# Patient Record
Sex: FEMALE | Race: WHITE | Employment: FULL TIME | URBAN - METROPOLITAN AREA
[De-identification: names, ages, dates, MRNs, and addresses within clinical notes are randomized per-mention and may not be internally consistent; named-entity substitution may affect disease eponyms.]

---

## 2017-02-24 LAB
ALK PHOSPHATASE: 146 IU/L (ref 35–121)
AVERAGE GLUCOSE: 148 MG/DL (ref 66–114)
HBA1C MFR BLD: 6.8 % (ref 4.2–5.8)

## 2017-02-27 ENCOUNTER — OFFICE VISIT (OUTPATIENT)
Dept: FAMILY MEDICINE CLINIC | Age: 47
End: 2017-02-27

## 2017-02-27 VITALS
WEIGHT: 222.2 LBS | TEMPERATURE: 98.3 F | BODY MASS INDEX: 34.88 KG/M2 | DIASTOLIC BLOOD PRESSURE: 64 MMHG | RESPIRATION RATE: 16 BRPM | HEART RATE: 88 BPM | SYSTOLIC BLOOD PRESSURE: 118 MMHG | HEIGHT: 67 IN

## 2017-02-27 DIAGNOSIS — E78.5 HYPERLIPIDEMIA, UNSPECIFIED HYPERLIPIDEMIA TYPE: ICD-10-CM

## 2017-02-27 DIAGNOSIS — R74.8 ALKALINE PHOSPHATASE RAISED: ICD-10-CM

## 2017-02-27 DIAGNOSIS — L82.1 SEBORRHEIC KERATOSIS: ICD-10-CM

## 2017-02-27 PROCEDURE — G8484 FLU IMMUNIZE NO ADMIN: HCPCS | Performed by: FAMILY MEDICINE

## 2017-02-27 PROCEDURE — G8427 DOCREV CUR MEDS BY ELIG CLIN: HCPCS | Performed by: FAMILY MEDICINE

## 2017-02-27 PROCEDURE — 99214 OFFICE O/P EST MOD 30 MIN: CPT | Performed by: FAMILY MEDICINE

## 2017-02-27 PROCEDURE — 1036F TOBACCO NON-USER: CPT | Performed by: FAMILY MEDICINE

## 2017-02-27 PROCEDURE — G8417 CALC BMI ABV UP PARAM F/U: HCPCS | Performed by: FAMILY MEDICINE

## 2017-02-27 PROCEDURE — 3044F HG A1C LEVEL LT 7.0%: CPT | Performed by: FAMILY MEDICINE

## 2017-02-27 RX ORDER — LISINOPRIL 5 MG/1
5 TABLET ORAL DAILY
Qty: 90 TABLET | Refills: 3 | Status: SHIPPED | OUTPATIENT
Start: 2017-02-27 | End: 2018-06-26 | Stop reason: SDUPTHER

## 2017-02-27 RX ORDER — INSULIN GLARGINE 100 [IU]/ML
INJECTION, SOLUTION SUBCUTANEOUS 2 TIMES DAILY
COMMUNITY
End: 2017-02-27 | Stop reason: SDUPTHER

## 2017-02-27 RX ORDER — INSULIN GLARGINE 100 [IU]/ML
INJECTION, SOLUTION SUBCUTANEOUS
Qty: 4 VIAL | Refills: 3 | Status: SHIPPED | OUTPATIENT
Start: 2017-02-27 | End: 2018-03-22 | Stop reason: SDUPTHER

## 2017-02-27 ASSESSMENT — ENCOUNTER SYMPTOMS
NAUSEA: 0
ANAL BLEEDING: 0
ABDOMINAL PAIN: 0
VOMITING: 0
CONSTIPATION: 0
CHEST TIGHTNESS: 0
SHORTNESS OF BREATH: 0
BLOOD IN STOOL: 0
DIARRHEA: 0

## 2017-03-13 ENCOUNTER — TELEPHONE (OUTPATIENT)
Dept: FAMILY MEDICINE CLINIC | Age: 47
End: 2017-03-13

## 2017-05-25 ENCOUNTER — TELEPHONE (OUTPATIENT)
Dept: FAMILY MEDICINE CLINIC | Age: 47
End: 2017-05-25

## 2017-05-26 LAB
ALBUMIN SERPL-MCNC: 3.7 G/DL (ref 3.2–5.3)
ALK PHOSPHATASE: 152 IU/L (ref 35–121)
ALT SERPL-CCNC: 9 IU/L (ref 5–59)
ANION GAP SERPL CALCULATED.3IONS-SCNC: 11 MMOL/L
AST SERPL-CCNC: 13 IU/L (ref 10–42)
BILIRUB SERPL-MCNC: 0.5 MG/DL (ref 0.2–1.3)
BUN BLDV-MCNC: 10 MG/DL (ref 10–20)
CALCIUM SERPL-MCNC: 9.4 MG/DL (ref 8.7–10.8)
CHLORIDE BLD-SCNC: 100 MMOL/L (ref 95–111)
CHOLESTEROL/HDL RATIO: 3.4
CHOLESTEROL: 217 MG/DL
CO2: 32 MMOL/L (ref 21–32)
CREAT SERPL-MCNC: 0.7 MG/DL (ref 0.5–1.3)
EGFR AFRICAN AMERICAN: 109
EGFR IF NONAFRICAN AMERICAN: 90
GLUCOSE: 182 MG/DL (ref 70–100)
HDLC SERPL-MCNC: 63 MG/DL (ref 40–60)
LDL CHOLESTEROL CALCULATED: 134 MG/DL
LDL/HDL RATIO: 2.1
POTASSIUM SERPL-SCNC: 4.3 MMOL/L (ref 3.5–5.4)
SODIUM BLD-SCNC: 139 MMOL/L (ref 134–147)
TOTAL PROTEIN: 6.7 G/DL (ref 5.8–8)
TRIGL SERPL-MCNC: 98 MG/DL
VLDLC SERPL CALC-MCNC: 20 MG/DL

## 2017-05-27 LAB
AVERAGE GLUCOSE: 160 MG/DL (ref 66–114)
HBA1C MFR BLD: 7.2 % (ref 4.2–5.8)
T4 FREE: 1.03 NG/DL (ref 0.8–1.8)
TSH SERPL DL<=0.05 MIU/L-ACNC: 4.21 UIU/ML (ref 0.4–4.4)

## 2017-06-06 ENCOUNTER — OFFICE VISIT (OUTPATIENT)
Dept: FAMILY MEDICINE CLINIC | Age: 47
End: 2017-06-06

## 2017-06-06 VITALS
WEIGHT: 222.4 LBS | HEART RATE: 80 BPM | SYSTOLIC BLOOD PRESSURE: 130 MMHG | TEMPERATURE: 97.7 F | DIASTOLIC BLOOD PRESSURE: 78 MMHG | BODY MASS INDEX: 34.83 KG/M2

## 2017-06-06 DIAGNOSIS — R74.8 ALKALINE PHOSPHATASE RAISED: ICD-10-CM

## 2017-06-06 DIAGNOSIS — Z12.31 VISIT FOR SCREENING MAMMOGRAM: ICD-10-CM

## 2017-06-06 DIAGNOSIS — E78.5 HYPERLIPIDEMIA, UNSPECIFIED HYPERLIPIDEMIA TYPE: ICD-10-CM

## 2017-06-06 PROCEDURE — 99214 OFFICE O/P EST MOD 30 MIN: CPT | Performed by: FAMILY MEDICINE

## 2017-06-06 PROCEDURE — G8417 CALC BMI ABV UP PARAM F/U: HCPCS | Performed by: FAMILY MEDICINE

## 2017-06-06 PROCEDURE — G8427 DOCREV CUR MEDS BY ELIG CLIN: HCPCS | Performed by: FAMILY MEDICINE

## 2017-06-06 PROCEDURE — 1036F TOBACCO NON-USER: CPT | Performed by: FAMILY MEDICINE

## 2017-06-06 PROCEDURE — 3045F PR MOST RECENT HEMOGLOBIN A1C LEVEL 7.0-9.0%: CPT | Performed by: FAMILY MEDICINE

## 2017-06-06 ASSESSMENT — ENCOUNTER SYMPTOMS
NAUSEA: 0
CHEST TIGHTNESS: 0
ANAL BLEEDING: 0
BLOOD IN STOOL: 0
VOMITING: 0
SHORTNESS OF BREATH: 0
CONSTIPATION: 0
DIARRHEA: 0
ABDOMINAL PAIN: 0

## 2017-09-08 LAB
ALK PHOSPHATASE: 159 IU/L (ref 35–121)
AVERAGE GLUCOSE: 163 MG/DL (ref 66–114)
CHOLESTEROL/HDL RATIO: 3.3
CHOLESTEROL: 173 MG/DL
HBA1C MFR BLD: 7.3 % (ref 4.2–5.8)
HDLC SERPL-MCNC: 53 MG/DL (ref 40–60)
LDL CHOLESTEROL CALCULATED: 93 MG/DL
LDL/HDL RATIO: 1.8
TRIGL SERPL-MCNC: 134 MG/DL
VLDLC SERPL CALC-MCNC: 27 MG/DL

## 2017-09-12 ENCOUNTER — OFFICE VISIT (OUTPATIENT)
Dept: FAMILY MEDICINE CLINIC | Age: 47
End: 2017-09-12
Payer: COMMERCIAL

## 2017-09-12 VITALS
HEIGHT: 66 IN | HEART RATE: 92 BPM | TEMPERATURE: 98.9 F | DIASTOLIC BLOOD PRESSURE: 58 MMHG | WEIGHT: 219 LBS | RESPIRATION RATE: 16 BRPM | BODY MASS INDEX: 35.2 KG/M2 | SYSTOLIC BLOOD PRESSURE: 108 MMHG

## 2017-09-12 DIAGNOSIS — E78.5 HYPERLIPIDEMIA, UNSPECIFIED HYPERLIPIDEMIA TYPE: ICD-10-CM

## 2017-09-12 DIAGNOSIS — R74.8 ALKALINE PHOSPHATASE RAISED: ICD-10-CM

## 2017-09-12 PROCEDURE — 1036F TOBACCO NON-USER: CPT | Performed by: FAMILY MEDICINE

## 2017-09-12 PROCEDURE — G8417 CALC BMI ABV UP PARAM F/U: HCPCS | Performed by: FAMILY MEDICINE

## 2017-09-12 PROCEDURE — G8427 DOCREV CUR MEDS BY ELIG CLIN: HCPCS | Performed by: FAMILY MEDICINE

## 2017-09-12 PROCEDURE — 99214 OFFICE O/P EST MOD 30 MIN: CPT | Performed by: FAMILY MEDICINE

## 2017-09-12 PROCEDURE — 3046F HEMOGLOBIN A1C LEVEL >9.0%: CPT | Performed by: FAMILY MEDICINE

## 2017-09-12 ASSESSMENT — ENCOUNTER SYMPTOMS
BLOOD IN STOOL: 0
DIARRHEA: 0
CHEST TIGHTNESS: 0
ANAL BLEEDING: 0
ABDOMINAL PAIN: 0
VOMITING: 0
CONSTIPATION: 0
NAUSEA: 0
SHORTNESS OF BREATH: 0

## 2017-09-12 ASSESSMENT — PATIENT HEALTH QUESTIONNAIRE - PHQ9
SUM OF ALL RESPONSES TO PHQ9 QUESTIONS 1 & 2: 0
1. LITTLE INTEREST OR PLEASURE IN DOING THINGS: 0
2. FEELING DOWN, DEPRESSED OR HOPELESS: 0
SUM OF ALL RESPONSES TO PHQ QUESTIONS 1-9: 0

## 2017-09-18 ENCOUNTER — PATIENT MESSAGE (OUTPATIENT)
Dept: FAMILY MEDICINE CLINIC | Age: 47
End: 2017-09-18

## 2017-09-19 ENCOUNTER — TELEPHONE (OUTPATIENT)
Dept: FAMILY MEDICINE CLINIC | Age: 47
End: 2017-09-19

## 2017-10-24 ENCOUNTER — PROCEDURE VISIT (OUTPATIENT)
Dept: FAMILY MEDICINE CLINIC | Age: 47
End: 2017-10-24
Payer: COMMERCIAL

## 2017-10-24 VITALS
SYSTOLIC BLOOD PRESSURE: 114 MMHG | DIASTOLIC BLOOD PRESSURE: 68 MMHG | WEIGHT: 218.8 LBS | BODY MASS INDEX: 35.32 KG/M2 | RESPIRATION RATE: 16 BRPM | TEMPERATURE: 97.8 F | HEART RATE: 88 BPM

## 2017-10-24 DIAGNOSIS — L91.8 CUTANEOUS TAG: Primary | ICD-10-CM

## 2017-10-24 PROCEDURE — 11200 RMVL SKIN TAGS UP TO&INC 15: CPT | Performed by: FAMILY MEDICINE

## 2017-10-24 RX ORDER — UBIDECARENONE 100 MG
200 CAPSULE ORAL DAILY
COMMUNITY

## 2017-12-10 LAB — ALK PHOSPHATASE: 163 IU/L (ref 35–121)

## 2017-12-12 LAB — LDL CHOLESTEROL DIRECT: 75 MG/DL

## 2017-12-13 ENCOUNTER — OFFICE VISIT (OUTPATIENT)
Dept: FAMILY MEDICINE CLINIC | Age: 47
End: 2017-12-13
Payer: COMMERCIAL

## 2017-12-13 VITALS
RESPIRATION RATE: 16 BRPM | BODY MASS INDEX: 35.64 KG/M2 | HEART RATE: 92 BPM | TEMPERATURE: 97.7 F | SYSTOLIC BLOOD PRESSURE: 120 MMHG | DIASTOLIC BLOOD PRESSURE: 66 MMHG | WEIGHT: 220.8 LBS

## 2017-12-13 DIAGNOSIS — E78.5 HYPERLIPIDEMIA, UNSPECIFIED HYPERLIPIDEMIA TYPE: ICD-10-CM

## 2017-12-13 DIAGNOSIS — R74.8 ALKALINE PHOSPHATASE RAISED: ICD-10-CM

## 2017-12-13 LAB
AVERAGE GLUCOSE: 157 MG/DL (ref 66–114)
HBA1C MFR BLD: 7.1 % (ref 4.2–5.8)

## 2017-12-13 PROCEDURE — 1036F TOBACCO NON-USER: CPT | Performed by: FAMILY MEDICINE

## 2017-12-13 PROCEDURE — G8482 FLU IMMUNIZE ORDER/ADMIN: HCPCS | Performed by: FAMILY MEDICINE

## 2017-12-13 PROCEDURE — G8427 DOCREV CUR MEDS BY ELIG CLIN: HCPCS | Performed by: FAMILY MEDICINE

## 2017-12-13 PROCEDURE — 99214 OFFICE O/P EST MOD 30 MIN: CPT | Performed by: FAMILY MEDICINE

## 2017-12-13 PROCEDURE — G8417 CALC BMI ABV UP PARAM F/U: HCPCS | Performed by: FAMILY MEDICINE

## 2017-12-13 PROCEDURE — 3045F PR MOST RECENT HEMOGLOBIN A1C LEVEL 7.0-9.0%: CPT | Performed by: FAMILY MEDICINE

## 2017-12-13 ASSESSMENT — ENCOUNTER SYMPTOMS
VOMITING: 0
CONSTIPATION: 0
DIARRHEA: 0
CHEST TIGHTNESS: 0
BLOOD IN STOOL: 0
ANAL BLEEDING: 0
NAUSEA: 0
SHORTNESS OF BREATH: 0
ABDOMINAL PAIN: 0
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
## 2017-12-18 ENCOUNTER — TELEPHONE (OUTPATIENT)
Dept: FAMILY MEDICINE CLINIC | Age: 47
End: 2017-12-18

## 2017-12-18 NOTE — TELEPHONE ENCOUNTER
The pt called with a list of her blood sugar readings:    12/13/17  10:40 ; took 18 units Lantus    12/14/17  123 fasting     10:50 ; took 18 units Lantus (ate   Smarties and peanut butter)    12/15/17 224 fasting     Didn't write down PM numbers    12/16/17  Didn't write down AM numbers    10:20 ; took 18 units Lantus (ate   1/2 piece of bread with peanut butter and   lightly sprinkled sugar)    12/17/18 Fasting 82    10:30  (munching all evening);   took 18 units and 1 unit of Humalog    12/18/17  Fasting 179    Please advise.

## 2017-12-18 NOTE — TELEPHONE ENCOUNTER
Sugars look higher in AM with decrease in evening Lantus and late night snacking. Try to cut oiut snacking and see where sugars go with only decreased Lantus.   ES

## 2018-01-04 ENCOUNTER — OFFICE VISIT (OUTPATIENT)
Dept: FAMILY MEDICINE CLINIC | Age: 48
End: 2018-01-04
Payer: COMMERCIAL

## 2018-01-04 VITALS
TEMPERATURE: 98.6 F | DIASTOLIC BLOOD PRESSURE: 56 MMHG | HEIGHT: 66 IN | BODY MASS INDEX: 35.93 KG/M2 | RESPIRATION RATE: 18 BRPM | SYSTOLIC BLOOD PRESSURE: 120 MMHG | WEIGHT: 223.6 LBS | HEART RATE: 102 BPM

## 2018-01-04 DIAGNOSIS — J20.9 ACUTE BRONCHITIS DUE TO INFECTION: Primary | ICD-10-CM

## 2018-01-04 PROCEDURE — 99213 OFFICE O/P EST LOW 20 MIN: CPT | Performed by: NURSE PRACTITIONER

## 2018-01-04 RX ORDER — DOXYCYCLINE HYCLATE 100 MG
100 TABLET ORAL 2 TIMES DAILY
Qty: 20 TABLET | Refills: 0 | Status: SHIPPED | OUTPATIENT
Start: 2018-01-04 | End: 2018-01-14

## 2018-01-04 ASSESSMENT — ENCOUNTER SYMPTOMS
EYE REDNESS: 0
COUGH: 1
SINUS PRESSURE: 0
SHORTNESS OF BREATH: 0
EYE ITCHING: 0
TROUBLE SWALLOWING: 0
VOMITING: 0
RHINORRHEA: 1
WHEEZING: 1
EYE PAIN: 0
HEMOPTYSIS: 0
CONSTIPATION: 0
CHEST TIGHTNESS: 0
EYE DISCHARGE: 0
NAUSEA: 0
ABDOMINAL PAIN: 0
DIARRHEA: 0
SORE THROAT: 1
BACK PAIN: 0

## 2018-01-04 NOTE — PROGRESS NOTES
Exam   Constitutional: She is oriented to person, place, and time. She appears well-developed and well-nourished. Non-toxic appearance. She appears ill. No distress. HENT:   Head: Normocephalic and atraumatic. Right Ear: Hearing, external ear and ear canal normal. Tympanic membrane is retracted. Left Ear: Hearing, external ear and ear canal normal. Tympanic membrane is retracted (bilateral TMs erythematous and bulging. ). Nose: Mucosal edema (bilateral nasal turbinates are erythematous and swollen) and rhinorrhea present. Right sinus exhibits maxillary sinus tenderness and frontal sinus tenderness. Left sinus exhibits maxillary sinus tenderness and frontal sinus tenderness. Mouth/Throat: Uvula is midline. Posterior oropharyngeal edema and posterior oropharyngeal erythema present. No oropharyngeal exudate or tonsillar abscesses. Eyes: Conjunctivae and EOM are normal. Pupils are equal, round, and reactive to light. Neck: Normal range of motion. Neck supple. No JVD present. No tracheal deviation present. No thyromegaly present. Cardiovascular: Normal rate and regular rhythm. Exam reveals no gallop and no friction rub. Murmur (grade 2) heard. Pulmonary/Chest: Effort normal. No stridor. No respiratory distress. She has wheezes. She has no rales. She exhibits no tenderness. Posterior upper lobes expiratory wheezing   Abdominal: Bowel sounds are normal.   Lymphadenopathy:     She has cervical adenopathy. Neurological: She is alert and oriented to person, place, and time. Skin: Skin is warm and dry. Psychiatric: Her behavior is normal. Judgment normal.   Nursing note and vitals reviewed. Assessment:      1. Acute bronchitis due to infection  doxycycline hyclate (VIBRA-TABS) 100 MG tablet           Plan:       Continue to monitor BS, Call office if any new concerns. Patient given educational materials - see patient instructions.   Discussed use, benefit, and side effects of prescribed

## 2018-03-18 LAB
ALK PHOSPHATASE: 165 U/L (ref 30–103)
AVERAGE GLUCOSE: 174 MG/DL (ref 66–114)
HBA1C MFR BLD: 7.7 % (ref 4.2–5.8)
LDL CHOLESTEROL DIRECT: 82 MG/DL

## 2018-03-19 LAB
CREATINE, URINE: 36.8 MG/DL (ref 28–217)
MICROALBUMIN/CREAT 24H UR: <12 MG/DL
MICROALBUMIN/CREAT UR-RTO: NORMAL MG/G

## 2018-03-22 ENCOUNTER — OFFICE VISIT (OUTPATIENT)
Dept: FAMILY MEDICINE CLINIC | Age: 48
End: 2018-03-22
Payer: COMMERCIAL

## 2018-03-22 VITALS
RESPIRATION RATE: 16 BRPM | DIASTOLIC BLOOD PRESSURE: 64 MMHG | HEART RATE: 96 BPM | WEIGHT: 224.2 LBS | BODY MASS INDEX: 36.19 KG/M2 | TEMPERATURE: 98.3 F | SYSTOLIC BLOOD PRESSURE: 132 MMHG

## 2018-03-22 DIAGNOSIS — E78.5 HYPERLIPIDEMIA, UNSPECIFIED HYPERLIPIDEMIA TYPE: ICD-10-CM

## 2018-03-22 DIAGNOSIS — R74.8 ALKALINE PHOSPHATASE RAISED: ICD-10-CM

## 2018-03-22 PROCEDURE — G8427 DOCREV CUR MEDS BY ELIG CLIN: HCPCS | Performed by: FAMILY MEDICINE

## 2018-03-22 PROCEDURE — 99214 OFFICE O/P EST MOD 30 MIN: CPT | Performed by: FAMILY MEDICINE

## 2018-03-22 PROCEDURE — 1036F TOBACCO NON-USER: CPT | Performed by: FAMILY MEDICINE

## 2018-03-22 PROCEDURE — G8417 CALC BMI ABV UP PARAM F/U: HCPCS | Performed by: FAMILY MEDICINE

## 2018-03-22 PROCEDURE — G8482 FLU IMMUNIZE ORDER/ADMIN: HCPCS | Performed by: FAMILY MEDICINE

## 2018-03-22 PROCEDURE — 3045F PR MOST RECENT HEMOGLOBIN A1C LEVEL 7.0-9.0%: CPT | Performed by: FAMILY MEDICINE

## 2018-03-22 RX ORDER — INSULIN GLARGINE 100 [IU]/ML
INJECTION, SOLUTION SUBCUTANEOUS
Qty: 4 VIAL | Refills: 3 | Status: SHIPPED | OUTPATIENT
Start: 2018-03-22 | End: 2019-08-06 | Stop reason: SDUPTHER

## 2018-03-22 ASSESSMENT — ENCOUNTER SYMPTOMS
NAUSEA: 0
SHORTNESS OF BREATH: 0
ANAL BLEEDING: 0
ABDOMINAL PAIN: 0
CHEST TIGHTNESS: 0
VOMITING: 0
DIARRHEA: 0
CONSTIPATION: 0
BLOOD IN STOOL: 0

## 2018-03-22 NOTE — PROGRESS NOTES
behavior is normal. Judgment and thought content normal.   Nursing note and vitals reviewed.     Component      Latest Ref Rng & Units 3/17/2018   Creatine, Urine      28 - 217 mg/dL 36.8   Microalb, Ur      mg/dL <12.0   Microalbumin Creatinine Ratio      <30 mg/g UNABLE TO CALCULATE   Hemoglobin A1C      4.2 - 5.8 % 7.7 (H)   AVERAGE GLUCOSE      66 - 114 mg/dL 174 (H)   Alk Phosphatase      30 - 103 U/L 165 (H)   LDL Direct      <100 mg/dL 82       Component      Latest Ref Rng & Units 3/17/2018 12/9/2017 9/7/2017   Glucose      70 - 100 mg/dl      BUN      10 - 20 mg/dl      Creatinine      0.5 - 1.3 mg/dl      eGFR African American      >60      EGFR IF NonAfrican American      >60      Calcium      8.7 - 10.8 mg/dl      Sodium      134 - 147 mmol/L      Potassium      3.5 - 5.4 mmol/L      Chloride      95 - 111 mmol/L      CO2      21 - 32 mmol/L      Anion Gap            Bilirubin      0.2 - 1.3 mg/dl      Alk Phosphatase      30 - 103 U/L 165 (H) 163 (H) 159 (H)   AST      10 - 42 IU/L      ALT      5 - 59 IU/L      Total Protein      5.8 - 8.0 g/dl      Albumin      3.2 - 5.3 g/dl      Cholesterol      <200 mg/dl   173   Triglycerides      <150 mg/dl   134   HDL Cholesterol      40 - 60 mg/dl   53   LDL Calculated      <130 mg/dL   93   VLDL Cholesterol Calculated      <30 mg/dL   27   LDl/HDL Ratio      <3.5   1.8   Chol/HDL Ratio      <5   3.3   Creatine, Urine      28 - 217 mg/dL 36.8     Microalb, Ur      mg/dL <12.0     Microalbumin Creatinine Ratio      <30 mg/g UNABLE TO CALCULATE     Hemoglobin A1C      4.2 - 5.8 % 7.7 (H) 7.1 (H) 7.3 (H)   AVERAGE GLUCOSE      66 - 114 mg/dL 174 (H) 157 (H) 163 (H)   TSH      0.40 - 4.40 uIU/mL      T4 Free      0.80 - 1.80 ng/dl      LDL Direct      <100 mg/dL 82 75      Component      Latest Ref Rng & Units 5/26/2017   Glucose      70 - 100 mg/dl 182 (H)   BUN      10 - 20 mg/dl 10   Creatinine      0.5 - 1.3 mg/dl 0.7   eGFR African American      >60 109   EGFR IF NonAfrican American      >60 90   Calcium      8.7 - 10.8 mg/dl 9.4   Sodium      134 - 147 mmol/L 139   Potassium      3.5 - 5.4 mmol/L 4.3   Chloride      95 - 111 mmol/L 100   CO2      21 - 32 mmol/L 32   Anion Gap       11   Bilirubin      0.2 - 1.3 mg/dl 0.5   Alk Phosphatase      30 - 103 U/L 152 (H)   AST      10 - 42 IU/L 13   ALT      5 - 59 IU/L 9   Total Protein      5.8 - 8.0 g/dl 6.7   Albumin      3.2 - 5.3 g/dl 3.7   Cholesterol      <200 mg/dl 217 (H)   Triglycerides      <150 mg/dl 98   HDL Cholesterol      40 - 60 mg/dl 63 (H)   LDL Calculated      <130 mg/dL 134 (H)   VLDL Cholesterol Calculated      <30 mg/dL 20   LDl/HDL Ratio      <3.5 2.1   Chol/HDL Ratio      <5 3.4   Creatine, Urine      28 - 217 mg/dL    Microalb, Ur      mg/dL    Microalbumin Creatinine Ratio      <30 mg/g    Hemoglobin A1C      4.2 - 5.8 % 7.2 (H)   AVERAGE GLUCOSE      66 - 114 mg/dL 160 (H)   TSH      0.40 - 4.40 uIU/mL 4.210   T4 Free      0.80 - 1.80 ng/dl 1.03   LDL Direct      <100 mg/dL        The 10-year ASCVD risk score (Carl Lomeli., et al., 2013) is: 1.8%    Values used to calculate the score:      Age: 49 years      Sex: Female      Is Non- : No      Diabetic: Yes      Tobacco smoker: No      Systolic Blood Pressure: 883 mmHg      Is BP treated: No      HDL Cholesterol: 53 mg/dl      Total Cholesterol: 173 mg/dl       Immunization History   Administered Date(s) Administered    Influenza Virus Vaccine 12/01/2012, 11/29/2013, 12/10/2014, 11/09/2015, 10/23/2017    Influenza, Luciana Klein, 3 Years and older, IM 10/03/2016    Pneumococcal Polysaccharide ([id number]) 08/03/2011    Td 01/01/2008    Tdap (Boostrix, Adacel) 09/13/2017       Health Maintenance   Topic Date Due    Diabetic retinal exam  04/24/2018    Cervical cancer screen  05/30/2018    Diabetic foot exam  06/06/2018    A1C test (Diabetic or Prediabetic)  06/17/2018    Diabetic microalbuminuria test  03/17/2019    Lipid

## 2018-05-07 ENCOUNTER — TELEPHONE (OUTPATIENT)
Dept: FAMILY MEDICINE CLINIC | Age: 48
End: 2018-05-07

## 2018-05-14 ENCOUNTER — TELEPHONE (OUTPATIENT)
Dept: FAMILY MEDICINE CLINIC | Age: 48
End: 2018-05-14

## 2018-06-23 LAB
AVERAGE GLUCOSE: 154
HBA1C MFR BLD: 7 %

## 2018-06-24 LAB
AVERAGE GLUCOSE: 154 MG/DL (ref 66–114)
HBA1C MFR BLD: 7 % (ref 4.2–5.8)
T4 FREE: 1.17 NG/DL (ref 0.8–1.9)
TSH SERPL DL<=0.05 MIU/L-ACNC: 2.05 UIU/ML (ref 0.4–4.1)

## 2018-06-26 ENCOUNTER — OFFICE VISIT (OUTPATIENT)
Dept: FAMILY MEDICINE CLINIC | Age: 48
End: 2018-06-26
Payer: COMMERCIAL

## 2018-06-26 VITALS
BODY MASS INDEX: 35.51 KG/M2 | HEART RATE: 80 BPM | TEMPERATURE: 97.8 F | SYSTOLIC BLOOD PRESSURE: 110 MMHG | DIASTOLIC BLOOD PRESSURE: 64 MMHG | WEIGHT: 220 LBS | RESPIRATION RATE: 16 BRPM

## 2018-06-26 DIAGNOSIS — R74.8 ALKALINE PHOSPHATASE RAISED: ICD-10-CM

## 2018-06-26 DIAGNOSIS — E78.5 HYPERLIPIDEMIA, UNSPECIFIED HYPERLIPIDEMIA TYPE: ICD-10-CM

## 2018-06-26 PROCEDURE — 99214 OFFICE O/P EST MOD 30 MIN: CPT | Performed by: FAMILY MEDICINE

## 2018-06-26 PROCEDURE — 3045F PR MOST RECENT HEMOGLOBIN A1C LEVEL 7.0-9.0%: CPT | Performed by: FAMILY MEDICINE

## 2018-06-26 PROCEDURE — 2022F DILAT RTA XM EVC RTNOPTHY: CPT | Performed by: FAMILY MEDICINE

## 2018-06-26 PROCEDURE — 1036F TOBACCO NON-USER: CPT | Performed by: FAMILY MEDICINE

## 2018-06-26 PROCEDURE — G8417 CALC BMI ABV UP PARAM F/U: HCPCS | Performed by: FAMILY MEDICINE

## 2018-06-26 PROCEDURE — G8427 DOCREV CUR MEDS BY ELIG CLIN: HCPCS | Performed by: FAMILY MEDICINE

## 2018-06-26 RX ORDER — LISINOPRIL 5 MG/1
5 TABLET ORAL DAILY
Qty: 90 TABLET | Refills: 3 | Status: SHIPPED | OUTPATIENT
Start: 2018-06-26 | End: 2019-08-06 | Stop reason: SDUPTHER

## 2018-06-26 ASSESSMENT — ENCOUNTER SYMPTOMS
VOMITING: 0
BLOOD IN STOOL: 0
ANAL BLEEDING: 0
CONSTIPATION: 0
ABDOMINAL PAIN: 0
DIARRHEA: 0
SHORTNESS OF BREATH: 0
CHEST TIGHTNESS: 0
NAUSEA: 0

## 2018-06-28 LAB
ALBUMIN SERPL-MCNC: 3.9 G/DL (ref 3.5–5.2)
ALK PHOSPHATASE: 165 U/L (ref 30–103)
ALT SERPL-CCNC: 10 U/L (ref 5–40)
ANION GAP SERPL CALCULATED.3IONS-SCNC: 9 MEQ/L (ref 10–19)
AST SERPL-CCNC: 9 U/L (ref 9–40)
BILIRUB SERPL-MCNC: 0.5 MG/DL
BUN BLDV-MCNC: 10 MG/DL (ref 8–23)
CALCIUM SERPL-MCNC: 8.7 MG/DL (ref 8.5–10.5)
CHLORIDE BLD-SCNC: 97 MEQ/L (ref 95–107)
CO2: 29 MEQ/L (ref 19–31)
CREAT SERPL-MCNC: 0.7 MG/DL (ref 0.6–1.3)
EGFR AFRICAN AMERICAN: 118.7 ML/MIN/1.73 M2
EGFR IF NONAFRICAN AMERICAN: 102.5 ML/MIN/1.73 M2
GLUCOSE: 231 MG/DL (ref 70–99)
LDL CHOLESTEROL DIRECT: 99 MG/DL
POTASSIUM SERPL-SCNC: 4.7 MEQ/L (ref 3.5–5.4)
SODIUM BLD-SCNC: 135 MEQ/L (ref 135–146)
TOTAL PROTEIN: 6.3 G/DL (ref 6.1–8.3)

## 2018-07-11 ENCOUNTER — HOSPITAL ENCOUNTER (OUTPATIENT)
Dept: WOMENS IMAGING | Age: 48
Discharge: HOME | End: 2018-07-11
Payer: COMMERCIAL

## 2018-07-11 DIAGNOSIS — Z12.39 BREAST SCREENING: ICD-10-CM

## 2018-07-11 PROCEDURE — 77063 BREAST TOMOSYNTHESIS BI: CPT

## 2018-08-07 DIAGNOSIS — E11.9 TYPE 2 DIABETES MELLITUS WITHOUT COMPLICATION (HCC): ICD-10-CM

## 2018-08-07 RX ORDER — PEN NEEDLE, DIABETIC 31 GX5/16"
NEEDLE, DISPOSABLE MISCELLANEOUS
Qty: 100 EACH | Refills: 5 | Status: SHIPPED | OUTPATIENT
Start: 2018-08-07

## 2018-08-07 RX ORDER — NAPROXEN SODIUM 220 MG
TABLET ORAL
Qty: 200 EACH | Refills: 3 | Status: SHIPPED | OUTPATIENT
Start: 2018-08-07 | End: 2020-08-25

## 2018-08-07 RX ORDER — ATORVASTATIN CALCIUM 80 MG/1
80 TABLET, FILM COATED ORAL DAILY
Qty: 90 TABLET | Refills: 3 | Status: SHIPPED | OUTPATIENT
Start: 2018-08-07 | End: 2019-01-08 | Stop reason: SDUPTHER

## 2018-08-17 ENCOUNTER — TELEPHONE (OUTPATIENT)
Dept: FAMILY MEDICINE CLINIC | Age: 48
End: 2018-08-17

## 2018-08-20 NOTE — TELEPHONE ENCOUNTER
I was unable to find anything locally to donate medication/supplies to. I did notify the pt and she verbalized understanding.

## 2018-09-23 LAB
ALK PHOSPHATASE: 178 U/L (ref 30–103)
AVERAGE GLUCOSE: 163 MG/DL (ref 66–114)
CHOLESTEROL/HDL RATIO: 2.9
CHOLESTEROL: 168 MG/DL
HBA1C MFR BLD: 7.3 % (ref 4.2–5.8)
HDLC SERPL-MCNC: 58.9 MG/DL
LDL CHOLESTEROL CALCULATED: 85 MG/DL
LDL/HDL RATIO: 1.4
TRIGL SERPL-MCNC: 123 MG/DL
VLDLC SERPL CALC-MCNC: 25 MG/DL

## 2018-10-04 ENCOUNTER — OFFICE VISIT (OUTPATIENT)
Dept: FAMILY MEDICINE CLINIC | Age: 48
End: 2018-10-04
Payer: COMMERCIAL

## 2018-10-04 VITALS
RESPIRATION RATE: 16 BRPM | DIASTOLIC BLOOD PRESSURE: 70 MMHG | HEART RATE: 92 BPM | WEIGHT: 219.4 LBS | BODY MASS INDEX: 35.41 KG/M2 | SYSTOLIC BLOOD PRESSURE: 144 MMHG

## 2018-10-04 DIAGNOSIS — E78.5 HYPERLIPIDEMIA, UNSPECIFIED HYPERLIPIDEMIA TYPE: ICD-10-CM

## 2018-10-04 DIAGNOSIS — J30.81 ALLERGIC RHINITIS DUE TO ANIMAL HAIR AND DANDER: ICD-10-CM

## 2018-10-04 DIAGNOSIS — R74.8 ALKALINE PHOSPHATASE RAISED: ICD-10-CM

## 2018-10-04 PROCEDURE — G8427 DOCREV CUR MEDS BY ELIG CLIN: HCPCS | Performed by: FAMILY MEDICINE

## 2018-10-04 PROCEDURE — 1036F TOBACCO NON-USER: CPT | Performed by: FAMILY MEDICINE

## 2018-10-04 PROCEDURE — G8484 FLU IMMUNIZE NO ADMIN: HCPCS | Performed by: FAMILY MEDICINE

## 2018-10-04 PROCEDURE — G8417 CALC BMI ABV UP PARAM F/U: HCPCS | Performed by: FAMILY MEDICINE

## 2018-10-04 PROCEDURE — 2022F DILAT RTA XM EVC RTNOPTHY: CPT | Performed by: FAMILY MEDICINE

## 2018-10-04 PROCEDURE — 99214 OFFICE O/P EST MOD 30 MIN: CPT | Performed by: FAMILY MEDICINE

## 2018-10-04 PROCEDURE — 3045F PR MOST RECENT HEMOGLOBIN A1C LEVEL 7.0-9.0%: CPT | Performed by: FAMILY MEDICINE

## 2018-10-04 RX ORDER — LORATADINE 10 MG/1
10 TABLET ORAL NIGHTLY
COMMUNITY

## 2018-10-04 ASSESSMENT — ENCOUNTER SYMPTOMS
CONSTIPATION: 0
ABDOMINAL PAIN: 0
ANAL BLEEDING: 0
VOMITING: 0
BLOOD IN STOOL: 0
SHORTNESS OF BREATH: 0
CHEST TIGHTNESS: 0
NAUSEA: 0
DIARRHEA: 0

## 2018-10-04 ASSESSMENT — PATIENT HEALTH QUESTIONNAIRE - PHQ9
2. FEELING DOWN, DEPRESSED OR HOPELESS: 0
SUM OF ALL RESPONSES TO PHQ9 QUESTIONS 1 & 2: 0
SUM OF ALL RESPONSES TO PHQ QUESTIONS 1-9: 0
1. LITTLE INTEREST OR PLEASURE IN DOING THINGS: 0
SUM OF ALL RESPONSES TO PHQ QUESTIONS 1-9: 0

## 2019-01-06 LAB
ALK PHOSPHATASE: 181 U/L (ref 30–103)
LDL CHOLESTEROL DIRECT: 116 MG/DL

## 2019-01-07 LAB
AVERAGE GLUCOSE: 180 MG/DL (ref 66–114)
HBA1C MFR BLD: 7.9 %

## 2019-01-08 ENCOUNTER — OFFICE VISIT (OUTPATIENT)
Dept: FAMILY MEDICINE CLINIC | Age: 49
End: 2019-01-08
Payer: COMMERCIAL

## 2019-01-08 VITALS
SYSTOLIC BLOOD PRESSURE: 138 MMHG | HEART RATE: 84 BPM | RESPIRATION RATE: 14 BRPM | BODY MASS INDEX: 35.86 KG/M2 | TEMPERATURE: 97.4 F | WEIGHT: 222.2 LBS | DIASTOLIC BLOOD PRESSURE: 60 MMHG

## 2019-01-08 DIAGNOSIS — R74.8 ALKALINE PHOSPHATASE RAISED: ICD-10-CM

## 2019-01-08 DIAGNOSIS — E78.5 HYPERLIPIDEMIA, UNSPECIFIED HYPERLIPIDEMIA TYPE: ICD-10-CM

## 2019-01-08 DIAGNOSIS — J30.81 ALLERGIC RHINITIS DUE TO ANIMAL HAIR AND DANDER: ICD-10-CM

## 2019-01-08 PROCEDURE — G8417 CALC BMI ABV UP PARAM F/U: HCPCS | Performed by: FAMILY MEDICINE

## 2019-01-08 PROCEDURE — G8484 FLU IMMUNIZE NO ADMIN: HCPCS | Performed by: FAMILY MEDICINE

## 2019-01-08 PROCEDURE — 1036F TOBACCO NON-USER: CPT | Performed by: FAMILY MEDICINE

## 2019-01-08 PROCEDURE — 99214 OFFICE O/P EST MOD 30 MIN: CPT | Performed by: FAMILY MEDICINE

## 2019-01-08 PROCEDURE — 2022F DILAT RTA XM EVC RTNOPTHY: CPT | Performed by: FAMILY MEDICINE

## 2019-01-08 PROCEDURE — 3045F PR MOST RECENT HEMOGLOBIN A1C LEVEL 7.0-9.0%: CPT | Performed by: FAMILY MEDICINE

## 2019-01-08 PROCEDURE — G8427 DOCREV CUR MEDS BY ELIG CLIN: HCPCS | Performed by: FAMILY MEDICINE

## 2019-01-08 RX ORDER — ATORVASTATIN CALCIUM 80 MG/1
80 TABLET, FILM COATED ORAL DAILY
Qty: 90 TABLET | Refills: 3 | Status: SHIPPED | OUTPATIENT
Start: 2019-01-08 | End: 2019-08-06

## 2019-01-08 ASSESSMENT — ENCOUNTER SYMPTOMS
BLOOD IN STOOL: 0
ABDOMINAL PAIN: 0
DIARRHEA: 0
NAUSEA: 0
CONSTIPATION: 0
SHORTNESS OF BREATH: 0
ANAL BLEEDING: 0
VOMITING: 0
CHEST TIGHTNESS: 0

## 2019-01-22 ENCOUNTER — HOSPITAL ENCOUNTER (OUTPATIENT)
Dept: ULTRASOUND IMAGING | Age: 49
Discharge: HOME | End: 2019-01-22
Payer: COMMERCIAL

## 2019-01-22 DIAGNOSIS — R74.8 ABNORMAL LIVER ENZYMES: ICD-10-CM

## 2019-01-22 PROCEDURE — 76705 ECHO EXAM OF ABDOMEN: CPT

## 2019-01-22 PROCEDURE — 93975 VASCULAR STUDY: CPT

## 2019-03-24 LAB
ALK PHOSPHATASE: 154 U/L (ref 30–103)
LDL CHOLESTEROL DIRECT: 163 MG/DL

## 2019-03-25 LAB
AVERAGE GLUCOSE: 166 MG/DL (ref 66–114)
CREATINE, URINE: 31.2 MG/DL (ref 28–217)
HBA1C MFR BLD: 7.4 %
MICROALBUMIN/CREAT 24H UR: <1.2 MG/DL (ref 1.2–40)
MICROALBUMIN/CREAT UR-RTO: ABNORMAL MG/G

## 2019-04-10 ENCOUNTER — OFFICE VISIT (OUTPATIENT)
Dept: FAMILY MEDICINE CLINIC | Age: 49
End: 2019-04-10
Payer: COMMERCIAL

## 2019-04-10 VITALS
DIASTOLIC BLOOD PRESSURE: 68 MMHG | WEIGHT: 222 LBS | HEART RATE: 84 BPM | SYSTOLIC BLOOD PRESSURE: 120 MMHG | BODY MASS INDEX: 35.83 KG/M2 | RESPIRATION RATE: 16 BRPM | TEMPERATURE: 98.5 F

## 2019-04-10 DIAGNOSIS — R74.8 ALKALINE PHOSPHATASE RAISED: ICD-10-CM

## 2019-04-10 DIAGNOSIS — E78.5 HYPERLIPIDEMIA, UNSPECIFIED HYPERLIPIDEMIA TYPE: ICD-10-CM

## 2019-04-10 DIAGNOSIS — J30.81 ALLERGIC RHINITIS DUE TO ANIMAL HAIR AND DANDER: ICD-10-CM

## 2019-04-10 PROCEDURE — G8427 DOCREV CUR MEDS BY ELIG CLIN: HCPCS | Performed by: FAMILY MEDICINE

## 2019-04-10 PROCEDURE — 1036F TOBACCO NON-USER: CPT | Performed by: FAMILY MEDICINE

## 2019-04-10 PROCEDURE — 99214 OFFICE O/P EST MOD 30 MIN: CPT | Performed by: FAMILY MEDICINE

## 2019-04-10 PROCEDURE — 2022F DILAT RTA XM EVC RTNOPTHY: CPT | Performed by: FAMILY MEDICINE

## 2019-04-10 PROCEDURE — 3045F PR MOST RECENT HEMOGLOBIN A1C LEVEL 7.0-9.0%: CPT | Performed by: FAMILY MEDICINE

## 2019-04-10 PROCEDURE — G8417 CALC BMI ABV UP PARAM F/U: HCPCS | Performed by: FAMILY MEDICINE

## 2019-04-10 ASSESSMENT — ENCOUNTER SYMPTOMS
SHORTNESS OF BREATH: 0
BLOOD IN STOOL: 0
CONSTIPATION: 0
CHEST TIGHTNESS: 0
ABDOMINAL PAIN: 0
DIARRHEA: 0
NAUSEA: 0
VOMITING: 0
ANAL BLEEDING: 0

## 2019-04-10 ASSESSMENT — PATIENT HEALTH QUESTIONNAIRE - PHQ9
1. LITTLE INTEREST OR PLEASURE IN DOING THINGS: 0
2. FEELING DOWN, DEPRESSED OR HOPELESS: 0
SUM OF ALL RESPONSES TO PHQ QUESTIONS 1-9: 0
SUM OF ALL RESPONSES TO PHQ QUESTIONS 1-9: 0
SUM OF ALL RESPONSES TO PHQ9 QUESTIONS 1 & 2: 0

## 2019-04-10 NOTE — PROGRESS NOTES
[facility]  [facility]  Dept: [phone]  Dept Fax: [phone]    SHEILA Stewart is a 51 y. o.female    Pt presents for follow up of IDDM    Pt feeling ok since last visit- no new complaints, issues, or problems, except as noted below:     Glucometer readings at home are running \"pretty good\", rarely >190. Checking 4-5x/day  The home BP readings have not been checked recently. Weight unchanged since last visit 3 months ago. Patient Active Problem List   Diagnosis    Hyperlipidemia    Alkaline phosphatase raised    Insulin dependent diabetes mellitus ([facility] [address]    Allergic rhinitis due to animal hair and dander       Current Outpatient Medications   Medication Sig Dispense Refill    APPLE CIDER VINEGAR PO Drink with water at every meal      loratadine (CLARITIN) 10 MG tablet Take 10 mg by mouth daily      insulin lispro (HUMALOG KWIKPEN) 100 UNIT/ML pen Inject 7 units with breakfast, 10 units with lunch and dinner plus STR group 1 scale with a maximum of 50 units per day total 15 pen 3    Insulin Pen Needle (PEN NEEDLES 31GX5/16\") 31G X 8 MM MISC Use with Humalog KwikPen Dx: E11.9 100 each 5    Insulin Syringe-Needle U-100 (INSULIN SYRINGE .5CC/31GX5/16\") 31G X 5/16\" 0.5 ML MISC Use with Lantus insulin BID Dx: E11.9 200 each 3    lisinopril (PRINIVIL;ZESTRIL) 5 MG tablet Take 1 tablet by mouth daily 90 tablet 3    insulin glargine (LANTUS) 100 UNIT/ML injection vial 15 units am and 18 units nightly 4 vial 3    coenzyme Q10 100 MG CAPS capsule Take 200 mg by mouth daily      Ascorbic Acid (VITAMIN C) 500 MG tablet Take 500 mg by mouth daily.  B Complex Vitamins (VITAMIN-B COMPLEX) TABS Take by mouth daily occasionally      atorvastatin (LIPITOR) 80 MG tablet Take 1 tablet by mouth daily 90 tablet 3     No current facility-administered medications for this visit.         Review of Systems   Constitutional: Negative for chills, Microalb, Ur      1.2 - 40.0 mg/dL <1.2 (L)   Microalbumin Creatinine Ratio      <30 mg/g UNABLE TO CALCULATE   Hemoglobin A1C      <5.5 % 7.4 (H)   AVERAGE GLUCOSE      66 - 114 mg/dL 166 (H)   Alk Phosphatase      30 - 103 U/L 154 (H)   LDL Direct      <100 mg/dL 163 (H)       Lab Results   Component Value Date    LABA1C 7.4 (H) 03/23/2019       Lab Results   Component Value Date    CHOL 168 09/22/2018    TRIG 123 09/22/2018    HDL 58.9 09/22/2018    LDLCALC 85 09/22/2018    LDLDIRECT 163 (H) 03/23/2019       The 10-year ASCVD risk score (Emilie Smith, et al., 2013) is: 1.4%    Values used to calculate the score:      Age: 51 years      Sex: Female      Is Non- : No      Diabetic: Yes      Tobacco smoker: No      Systolic Blood Pressure: 022 mmHg      Is BP treated: No      HDL Cholesterol: 58.9 mg/dL      Total Cholesterol: 168 mg/dL    Lab Results   Component Value Date     06/23/2018    K 4.7 06/23/2018    CL 97 06/23/2018    CO2 29 06/23/2018    BUN 10 06/23/2018    CREATININE 0.7 06/23/2018    GLUCOSE 231 (H) 06/23/2018    CALCIUM 8.7 06/23/2018    PROT 7.1 02/02/2019    LABALBU 4.1 02/02/2019    BILITOT 0.4 02/02/2019    ALKPHOS 154 (H) 03/23/2019    AST 16 02/02/2019    ALT 12 02/02/2019     Lab Results   Component Value Date    TSH 2.05 06/23/2018    T4FREE 1.17 06/23/2018     Immunization History   Administered Date(s) Administered    Influenza Virus Vaccine 12/01/2012, 11/29/2013, 12/10/2014, 11/09/2015, 10/23/2017, 10/24/2018    Influenza, Lakisha Chaim, 3 Years and older, IM (Fluzone 3 yrs and older or Afluria 5 yrs and older) 10/03/2016    Pneumococcal Polysaccharide ([id number]) 08/03/2011    Td, unspecified formulation 01/01/2008    Tdap (Boostrix, Adacel) 09/13/2017        Health Maintenance   Topic Date Due    Diabetic retinal exam  04/12/2019    Hepatitis B Vaccine (1 of 3 - Risk 3-dose series) 04/10/2021 (Originally 9/30/1989)    A1C test (Diabetic or Prediabetic) 06/23/2019    Potassium monitoring  06/23/2019    Creatinine monitoring  06/23/2019    Diabetic foot exam  06/26/2019    Diabetic microalbuminuria test  03/23/2020    Lipid screen  03/23/2020    Cervical cancer screen  05/30/2020    DTaP/Tdap/Td vaccine (2 - Td) 09/13/2027    Flu vaccine  Completed    Pneumococcal 0-64 years Vaccine  Completed    HIV screen  Addressed       AAA ultrasound (Male, 65-75, smoked ever) indicated at this time? NO  CT Lung Screen (55-80, 30 pk-yrs, smoking or quit <15 years) indicated at this time? NO    No future appointments. ASSESSMENT       Diagnosis Orders   1. Insulin dependent diabetes mellitus (HCC)  Hemoglobin A1C    Comprehensive Metabolic Panel    T4, Free    TSH without Reflex   2. Hyperlipidemia, unspecified hyperlipidemia type  Lipid Panel    Comprehensive Metabolic Panel    T4, Free    TSH without Reflex   3. Alkaline phosphatase raised  Lipid Panel    Comprehensive Metabolic Panel   4. Allergic rhinitis due to animal hair and dander         PLAN      PAP/ PELVIC done per [facility] late 5/2018- to follow up annually- please call for appt.    MAMMO done 7/11/2018- all ok- to be done after 7/11/2019    DILATED EYE EXAM- 4/12/2018- Dr. Vincenzo Braun- all ok- to do annually- please call for appt. Home BP's- call if > 140/90 on a regular basis. After discussion with pt, will hold on restarting Lipitor at this time as pt would like to try Berberine OTC to help with sugar and cholesterol management. Continue to work on diet, exercise, and weight loss for optimal cardiovascular health  Check HGA1C, FLP, CMP, and FREE T4/ TSH in 3 months. Continue current medicines otherwise.   No refills needed today  Follow up in 3 months        Electronically signed by Minda Rodriguez MD on 4/10/2019 at 3:43 PM

## 2019-04-10 NOTE — PATIENT INSTRUCTIONS
PAP/ PELVIC done per [facility] late 5/2018- to follow up annually- please call for appt.    MAMMO done 7/11/2018- all ok- to be done after 7/11/2019    DILATED EYE EXAM- 4/12/2018- Dr. Lisette Castellon- all ok- to do annually- please call for appt. Home BP's- call if > 140/90 on a regular basis. After discussion with pt, will hold on restarting Lipitor at this time as pt would like to try Berberine OTC to help with sugar and cholesterol management. Continue to work on diet, exercise, and weight loss for optimal cardiovascular health  Check HGA1C, FLP, CMP, and FREE T4/ TSH in 3 months. Continue current medicines otherwise.   No refills needed today  Follow up in 3 months

## 2019-05-30 NOTE — PATIENT INSTRUCTIONS
PAP/  PELVIC done per [facility] late 5/2017- to follow up annually    MAMMO done 7/10/2017- all ok- to do after 7/10/2018. DILATED EYE EXAM- 4/24/2017- Dr. Shasha Lee- all ok- to do annually. Will continue close observation of ALK PHOS- thought to be related to Lipitor in past per [facility]. After discussion with pt, will decrease evening Lantus to 18 units at this time. Will continue 15 units in AM.  Will treat any hyperglycemia with increased Humalog, rather than varying Lantus dose with variable sugars. Call update on sugars in 5 days. Check HGA1C, D-LDL, ALK PHOS, and SPOT MA again in 3 months. Continue to work on diet, exercise, and weight loss for optimal cardiovascular health. Continue current medicines otherwise.   No refills needed today  Follow up in 3 months Demar Wilkins   Time Calculation: 28 mins

## 2019-07-21 LAB
ALBUMIN SERPL-MCNC: 3.7 G/DL (ref 3.2–5.3)
ALK PHOSPHATASE: 156 U/L (ref 39–130)
ALT SERPL-CCNC: 13 U/L (ref 0–31)
ANION GAP SERPL CALCULATED.3IONS-SCNC: 10 MMOL/L (ref 4–12)
AST SERPL-CCNC: 31 U/L (ref 0–41)
AVERAGE GLUCOSE: 166 MG/DL (ref 66–114)
BILIRUB SERPL-MCNC: 0.6 MG/DL (ref 0.3–1.2)
BUN BLDV-MCNC: 10 MG/DL (ref 5–23)
CALCIUM SERPL-MCNC: 9.1 MG/DL (ref 8.5–10.5)
CHLORIDE BLD-SCNC: 99 MMOL/L (ref 98–109)
CHOLESTEROL/HDL RATIO: 3.8 (ref 1–5)
CHOLESTEROL: 238 MG/DL (ref 150–200)
CO2: 27 MMOL/L (ref 22–32)
CREAT SERPL-MCNC: 0.59 MG/DL (ref 0.4–1)
EGFR AFRICAN AMERICAN: >60 ML/MIN/1.73SQ.M
EGFR IF NONAFRICAN AMERICAN: >60 ML/MIN/1.73SQ.M
GLUCOSE: 155 MG/DL (ref 65–99)
HBA1C MFR BLD: 7.4 %
HDLC SERPL-MCNC: 62 MG/DL
LDL CHOLESTEROL CALCULATED: 158 MG/DL
LDL/HDL RATIO: 2.5
POTASSIUM SERPL-SCNC: 4.8 MMOL/L (ref 3.5–5)
SODIUM BLD-SCNC: 136 MMOL/L (ref 134–146)
T4 FREE: 0.82 NG/DL (ref 0.61–1.6)
TOTAL PROTEIN: 6.7 G/DL (ref 6–8)
TRIGL SERPL-MCNC: 91 MG/DL (ref 27–150)
TSH SERPL DL<=0.05 MIU/L-ACNC: 2.71 UIU/ML (ref 0.49–4.67)
VLDLC SERPL CALC-MCNC: 18 MG/DL (ref 0–30)

## 2019-08-01 ENCOUNTER — HOSPITAL ENCOUNTER (OUTPATIENT)
Dept: WOMENS IMAGING | Age: 49
Discharge: HOME | End: 2019-08-01
Payer: COMMERCIAL

## 2019-08-01 DIAGNOSIS — Z12.39 BREAST SCREENING: ICD-10-CM

## 2019-08-01 PROCEDURE — 77063 BREAST TOMOSYNTHESIS BI: CPT

## 2019-08-06 ENCOUNTER — OFFICE VISIT (OUTPATIENT)
Dept: FAMILY MEDICINE CLINIC | Age: 49
End: 2019-08-06
Payer: COMMERCIAL

## 2019-08-06 VITALS
SYSTOLIC BLOOD PRESSURE: 124 MMHG | BODY MASS INDEX: 37.45 KG/M2 | RESPIRATION RATE: 16 BRPM | WEIGHT: 232 LBS | DIASTOLIC BLOOD PRESSURE: 62 MMHG | TEMPERATURE: 97.9 F | HEART RATE: 92 BPM

## 2019-08-06 DIAGNOSIS — R74.8 ALKALINE PHOSPHATASE RAISED: ICD-10-CM

## 2019-08-06 DIAGNOSIS — E78.5 HYPERLIPIDEMIA, UNSPECIFIED HYPERLIPIDEMIA TYPE: ICD-10-CM

## 2019-08-06 PROCEDURE — 1036F TOBACCO NON-USER: CPT | Performed by: FAMILY MEDICINE

## 2019-08-06 PROCEDURE — 3045F PR MOST RECENT HEMOGLOBIN A1C LEVEL 7.0-9.0%: CPT | Performed by: FAMILY MEDICINE

## 2019-08-06 PROCEDURE — 99214 OFFICE O/P EST MOD 30 MIN: CPT | Performed by: FAMILY MEDICINE

## 2019-08-06 PROCEDURE — G8417 CALC BMI ABV UP PARAM F/U: HCPCS | Performed by: FAMILY MEDICINE

## 2019-08-06 PROCEDURE — 2022F DILAT RTA XM EVC RTNOPTHY: CPT | Performed by: FAMILY MEDICINE

## 2019-08-06 PROCEDURE — G8427 DOCREV CUR MEDS BY ELIG CLIN: HCPCS | Performed by: FAMILY MEDICINE

## 2019-08-06 RX ORDER — INSULIN GLARGINE 100 [IU]/ML
INJECTION, SOLUTION SUBCUTANEOUS
Qty: 4 VIAL | Refills: 3 | Status: SHIPPED | OUTPATIENT
Start: 2019-08-06 | End: 2021-10-05 | Stop reason: SDUPTHER

## 2019-08-06 RX ORDER — LISINOPRIL 5 MG/1
5 TABLET ORAL DAILY
Qty: 90 TABLET | Refills: 3 | Status: SHIPPED | OUTPATIENT
Start: 2019-08-06 | End: 2019-10-29 | Stop reason: SDUPTHER

## 2019-08-06 ASSESSMENT — ENCOUNTER SYMPTOMS
CONSTIPATION: 0
NAUSEA: 0
SHORTNESS OF BREATH: 0
VOMITING: 0
CHEST TIGHTNESS: 0
ABDOMINAL PAIN: 0
ANAL BLEEDING: 0
BLOOD IN STOOL: 0
DIARRHEA: 0

## 2019-08-06 NOTE — PROGRESS NOTES
[facility]  [facility]  Dept: [phone]  Dept Fax: [phone]    SUBJECTIVE     Caity Calvert is a 51 y. o.female    Pt presents for follow up of IDDM    Pt feeling ok since last visit- no new complaints, issues, or problems, except as noted below:   Pt had to put down her dog (\"Elpidio\") down on < 3 months ago. Pt complains of \" it's been very quiet in the house\". Pt states she has had decreased desire to go outside, exercise, and be around others\" Pt states \"I'm going to have to get better\"   Sleep-OK, \"not bad\"  Interest- decreased  Guilt- NO  Energy- Mediocre  Concentration- OK at work (\"10 hour days\")   Appetite- Fair  Psychomotor Retardation- NO  Suicidal/ Homicidal Ideations- NO  Anxiety-NO  Employer? Lost work days? - [employer] and [address]. Glucometer readings at home are running 's (\"when I've done something stupid). .Checking 4-5x/day. The home BP readings have not been checked recently. Weight increased 10# since last visit 3 months ago.       Patient Active Problem List   Diagnosis    Hyperlipidemia    Alkaline phosphatase raised    Insulin dependent diabetes mellitus (HCC)    Allergic rhinitis due to animal hair and dander     Current Outpatient Medications   Medication Sig Dispense Refill    lisinopril (PRINIVIL;ZESTRIL) 5 MG tablet Take 1 tablet by mouth daily 90 tablet 3    insulin lispro (HUMALOG KWIKPEN) 100 UNIT/ML pen Inject 7 units with breakfast, 10 units with lunch and dinner plus STR group 1 scale with a maximum of 50 units per day total 15 pen 3    insulin glargine (LANTUS) 100 UNIT/ML injection vial 15 units am and 16 units nightly 4 vial 3    APPLE CIDER VINEGAR PO Drink with water at every meal      loratadine (CLARITIN) 10 MG tablet Take 10 mg by mouth nightly       Insulin Pen Needle (PEN NEEDLES 31GX5/16\") 31G X 8 MM MISC Use with Humalog KwikPen Dx: E11.9 100 each 5    Insulin Syringe-Needle Effort normal and breath sounds normal.   Abdominal: Soft. Bowel sounds are normal.   Musculoskeletal: Normal range of motion. Visual inspection:  Deformity/amputation: absent  Skin lesions/pre-ulcerative calluses: absent  Edema: right- negative, left- negative    Sensory exam:  Monofilament sensation: normal  (minimum of 5 random plantar locations tested, avoiding callused areas - > 1 area with absence of sensation is + for neuropathy)    Plus at least one of the following:  Pulses: normal     Neurological: She is alert and oriented to person, place, and time. She has normal reflexes. Skin: Skin is warm and dry. Psychiatric: She has a normal mood and affect. Her behavior is normal. Judgment and thought content normal.   Nursing note and vitals reviewed.       Component      Latest Ref Rng & Units 7/20/2019   Glucose      65 - 99 mg/dL 155 (H)   BUN      5 - 23 mg/dL 10   Creatinine      0.40 - 1.00 mg/dL 0.59   eGFR African American      >59 ml/min/1.73sq.m >60   EGFR IF NonAfrican American      >59 ml/min/1.73sq.m >60   Calcium      8.5 - 10.5 mg/dL 9.1   Sodium      134 - 146 mmol/L 136   Potassium      3.5 - 5.0 mmol/L 4.8   Chloride      98 - 109 mmol/L 99   CO2      22 - 32 mmol/L 27   Anion Gap      4 - 12 mmol/L 10   Bilirubin      0.3 - 1.2 mg/dL 0.6   Alk Phosphatase      39 - 130 U/L 156 (H)   AST      0 - 41 U/L 31   ALT      0 - 31 U/L 13   Total Protein      6.0 - 8.0 g/dL 6.7   Albumin      3.2 - 5.3 g/dL 3.7   Cholesterol      150 - 200 mg/dL 238 (H)   Triglycerides      27 - 150 mg/dL 91   HDL Cholesterol      >39 mg/dL 62   LDL Calculated      <130 mg/dL 158 (H)   VLDL Cholesterol Calculated      0 - 30 mg/dL 18   LDl/HDL Ratio       2.5   Chol/HDL Ratio      1.0 - 5.0 3.8   Hemoglobin A1C      <5.5 % 7.4 (H)   AVERAGE GLUCOSE      66 - 114 mg/dL 166 (H)   TSH      0.49 - 4.67 uIU/mL 2.71   T4 Free      0.61 - 1.60 ng/dL 0.82       Component      Latest Ref Rng & Units 3/23/2019           9:51 insulin glargine (LANTUS) 100 UNIT/ML injection vial    Hemoglobin A1C   2. Hyperlipidemia, unspecified hyperlipidemia type  Cholesterol, Total    LDL Cholesterol, Direct   3. Alkaline phosphatase raised  Alkaline Phosphatase       PLAN      Encouraged annual FLU VACCINE after October 1st.    PAP/ PELVIC done per [facility] 7/30/2019- to follow up annually. MAMMO done 8/1/2019- all ok. DILATED EYE EXAM- 5/15/2019- Dr. Gonzalez Reveal- all ok- to do annually. Home BP's- call if > 140/90 on a regular basis. After discussion with pt, will hold on restarting Lipitor at this time as pt would like to try Berberine OTC and another OTC med to help with sugar and cholesterol management over the next 3 months. After discussion with pt, will decrease evening Lantus to 16 units and continue close observation of sugars. We may have to add some Humalog at night to help control sugars overnight. Continue to work on diet, exercise, and weight loss for optimal cardiovascular health  Check HGA1C, TC, D-LDL, ALK PHOS in 3 months  Continue current medicines otherwise.   Refills   Follow up in 3 months        Electronically signed by Radha Downs MD on 8/6/2019 at 4:28 PM

## 2019-10-27 LAB
ALK PHOSPHATASE: 156 U/L (ref 39–130)
AVERAGE GLUCOSE: 171 MG/DL
CHOLESTEROL: 248 MG/DL (ref 150–200)
HBA1C MFR BLD: 7.6 % (ref 4.4–6.4)
LDL CHOLESTEROL DIRECT: 172 MG/DL

## 2019-10-29 RX ORDER — LISINOPRIL 5 MG/1
5 TABLET ORAL DAILY
Qty: 90 TABLET | Refills: 3 | Status: SHIPPED | OUTPATIENT
Start: 2019-10-29 | End: 2022-03-07 | Stop reason: SDUPTHER

## 2019-11-11 ENCOUNTER — OFFICE VISIT (OUTPATIENT)
Dept: FAMILY MEDICINE CLINIC | Age: 49
End: 2019-11-11
Payer: COMMERCIAL

## 2019-11-11 VITALS
TEMPERATURE: 97.9 F | WEIGHT: 231.6 LBS | HEART RATE: 88 BPM | RESPIRATION RATE: 16 BRPM | SYSTOLIC BLOOD PRESSURE: 128 MMHG | DIASTOLIC BLOOD PRESSURE: 78 MMHG | BODY MASS INDEX: 37.38 KG/M2

## 2019-11-11 DIAGNOSIS — E78.5 HYPERLIPIDEMIA, UNSPECIFIED HYPERLIPIDEMIA TYPE: ICD-10-CM

## 2019-11-11 DIAGNOSIS — R74.8 ALKALINE PHOSPHATASE RAISED: ICD-10-CM

## 2019-11-11 DIAGNOSIS — J30.81 ALLERGIC RHINITIS DUE TO ANIMAL HAIR AND DANDER: ICD-10-CM

## 2019-11-11 PROCEDURE — G8417 CALC BMI ABV UP PARAM F/U: HCPCS | Performed by: FAMILY MEDICINE

## 2019-11-11 PROCEDURE — 3017F COLORECTAL CA SCREEN DOC REV: CPT | Performed by: FAMILY MEDICINE

## 2019-11-11 PROCEDURE — 99214 OFFICE O/P EST MOD 30 MIN: CPT | Performed by: FAMILY MEDICINE

## 2019-11-11 PROCEDURE — G8427 DOCREV CUR MEDS BY ELIG CLIN: HCPCS | Performed by: FAMILY MEDICINE

## 2019-11-11 PROCEDURE — G8484 FLU IMMUNIZE NO ADMIN: HCPCS | Performed by: FAMILY MEDICINE

## 2019-11-11 PROCEDURE — 2022F DILAT RTA XM EVC RTNOPTHY: CPT | Performed by: FAMILY MEDICINE

## 2019-11-11 PROCEDURE — 3051F HG A1C>EQUAL 7.0%<8.0%: CPT | Performed by: FAMILY MEDICINE

## 2019-11-11 PROCEDURE — 1036F TOBACCO NON-USER: CPT | Performed by: FAMILY MEDICINE

## 2019-11-11 RX ORDER — ATORVASTATIN CALCIUM 20 MG/1
20 TABLET, FILM COATED ORAL DAILY
Qty: 90 TABLET | Refills: 3 | Status: SHIPPED | OUTPATIENT
Start: 2019-11-11 | End: 2020-02-18

## 2019-11-11 ASSESSMENT — ENCOUNTER SYMPTOMS
VOMITING: 0
ABDOMINAL PAIN: 0
CHEST TIGHTNESS: 0
SHORTNESS OF BREATH: 0
DIARRHEA: 0
CONSTIPATION: 0
ANAL BLEEDING: 0
BLOOD IN STOOL: 0
NAUSEA: 0

## 2019-12-27 ENCOUNTER — OFFICE VISIT (OUTPATIENT)
Dept: FAMILY MEDICINE CLINIC | Age: 49
End: 2019-12-27
Payer: COMMERCIAL

## 2019-12-27 VITALS
BODY MASS INDEX: 36.42 KG/M2 | RESPIRATION RATE: 18 BRPM | HEART RATE: 80 BPM | HEIGHT: 66 IN | DIASTOLIC BLOOD PRESSURE: 82 MMHG | TEMPERATURE: 98.6 F | WEIGHT: 226.6 LBS | SYSTOLIC BLOOD PRESSURE: 126 MMHG

## 2019-12-27 DIAGNOSIS — J06.9 VIRAL URI: Primary | ICD-10-CM

## 2019-12-27 PROCEDURE — G8417 CALC BMI ABV UP PARAM F/U: HCPCS | Performed by: NURSE PRACTITIONER

## 2019-12-27 PROCEDURE — 1036F TOBACCO NON-USER: CPT | Performed by: NURSE PRACTITIONER

## 2019-12-27 PROCEDURE — G8484 FLU IMMUNIZE NO ADMIN: HCPCS | Performed by: NURSE PRACTITIONER

## 2019-12-27 PROCEDURE — G8427 DOCREV CUR MEDS BY ELIG CLIN: HCPCS | Performed by: NURSE PRACTITIONER

## 2019-12-27 PROCEDURE — 99213 OFFICE O/P EST LOW 20 MIN: CPT | Performed by: NURSE PRACTITIONER

## 2019-12-27 PROCEDURE — 3017F COLORECTAL CA SCREEN DOC REV: CPT | Performed by: NURSE PRACTITIONER

## 2019-12-27 RX ORDER — BENZONATATE 100 MG/1
100 CAPSULE ORAL 3 TIMES DAILY PRN
Qty: 30 CAPSULE | Refills: 0 | Status: SHIPPED | OUTPATIENT
Start: 2019-12-27 | End: 2020-01-03

## 2019-12-27 ASSESSMENT — ENCOUNTER SYMPTOMS
VOICE CHANGE: 1
VOMITING: 0
BLOOD IN STOOL: 0
CONSTIPATION: 0
COUGH: 1
RHINORRHEA: 0
NAUSEA: 0
SORE THROAT: 1
DIARRHEA: 0
SHORTNESS OF BREATH: 1

## 2020-02-15 LAB
AVERAGE GLUCOSE: 171
HBA1C MFR BLD: 7.6 %
LDL CHOLESTEROL DIRECT: 110 MG/DL

## 2020-02-18 ENCOUNTER — OFFICE VISIT (OUTPATIENT)
Dept: FAMILY MEDICINE CLINIC | Age: 50
End: 2020-02-18
Payer: COMMERCIAL

## 2020-02-18 VITALS
BODY MASS INDEX: 36.92 KG/M2 | RESPIRATION RATE: 16 BRPM | TEMPERATURE: 98.6 F | DIASTOLIC BLOOD PRESSURE: 70 MMHG | HEART RATE: 92 BPM | SYSTOLIC BLOOD PRESSURE: 118 MMHG | WEIGHT: 227 LBS

## 2020-02-18 PROCEDURE — G8484 FLU IMMUNIZE NO ADMIN: HCPCS | Performed by: FAMILY MEDICINE

## 2020-02-18 PROCEDURE — 1036F TOBACCO NON-USER: CPT | Performed by: FAMILY MEDICINE

## 2020-02-18 PROCEDURE — 99214 OFFICE O/P EST MOD 30 MIN: CPT | Performed by: FAMILY MEDICINE

## 2020-02-18 PROCEDURE — G8427 DOCREV CUR MEDS BY ELIG CLIN: HCPCS | Performed by: FAMILY MEDICINE

## 2020-02-18 PROCEDURE — 3017F COLORECTAL CA SCREEN DOC REV: CPT | Performed by: FAMILY MEDICINE

## 2020-02-18 PROCEDURE — G8417 CALC BMI ABV UP PARAM F/U: HCPCS | Performed by: FAMILY MEDICINE

## 2020-02-18 PROCEDURE — 3046F HEMOGLOBIN A1C LEVEL >9.0%: CPT | Performed by: FAMILY MEDICINE

## 2020-02-18 PROCEDURE — 2022F DILAT RTA XM EVC RTNOPTHY: CPT | Performed by: FAMILY MEDICINE

## 2020-02-18 RX ORDER — ATORVASTATIN CALCIUM 40 MG/1
40 TABLET, FILM COATED ORAL DAILY
Qty: 90 TABLET | Refills: 3 | Status: SHIPPED | OUTPATIENT
Start: 2020-02-18 | End: 2021-03-24 | Stop reason: SDUPTHER

## 2020-02-18 ASSESSMENT — ENCOUNTER SYMPTOMS
DIARRHEA: 0
ANAL BLEEDING: 0
VOMITING: 0
SHORTNESS OF BREATH: 0
BLOOD IN STOOL: 0
ABDOMINAL PAIN: 0
NAUSEA: 0
CHEST TIGHTNESS: 0
CONSTIPATION: 0

## 2020-02-18 ASSESSMENT — PATIENT HEALTH QUESTIONNAIRE - PHQ9
SUM OF ALL RESPONSES TO PHQ9 QUESTIONS 1 & 2: 0
SUM OF ALL RESPONSES TO PHQ QUESTIONS 1-9: 0
2. FEELING DOWN, DEPRESSED OR HOPELESS: 0
1. LITTLE INTEREST OR PLEASURE IN DOING THINGS: 0
SUM OF ALL RESPONSES TO PHQ QUESTIONS 1-9: 0

## 2020-02-18 NOTE — PROGRESS NOTES
[facility]  [facility]  Dept: [phone]  Dept Fax: [phone]    SHEILA Arroyo is a 47 y. o.female    Pt presents for follow up of IDDM and Hyperlipidemia. Pt feeling ok since last visit- no new complaints, issues, or problems, except as noted below:     Glucometer readings at home are running variable, typically 's, occasionally 300's. Checking 4-5x/day. The home BP readings have not been checked recently. Wt Readings from Last 3 Encounters:   02/18/20 227 lb (103 kg)   12/27/19 226 lb 9.6 oz (102.8 kg)   11/11/19 231 lb 9.6 oz (105.1 kg)   Weight decreased 4# since last visit 3 months ago. Patient Active Problem List   Diagnosis    Hyperlipidemia    Alkaline phosphatase raised    Insulin dependent diabetes mellitus ([facility] [address]    Allergic rhinitis due to animal hair and dander     Current Outpatient Medications   Medication Sig Dispense Refill    atorvastatin (LIPITOR) 40 MG tablet Take 1 tablet by mouth daily 90 tablet 3    lisinopril (PRINIVIL;ZESTRIL) 5 MG tablet Take 1 tablet by mouth daily 90 tablet 3    insulin lispro (HUMALOG KWIKPEN) 100 UNIT/ML pen Inject 7 units with breakfast, 10 units with lunch and dinner plus STR group 1 scale with a maximum of 50 units per day total 15 pen 3    insulin glargine (LANTUS) 100 UNIT/ML injection vial 15 units am and 16 units nightly 4 vial 3    loratadine (CLARITIN) 10 MG tablet Take 10 mg by mouth nightly       Insulin Pen Needle (PEN NEEDLES 31GX5/16\") 31G X 8 MM MISC Use with Humalog KwikPen Dx: E11.9 100 each 5    Insulin Syringe-Needle U-100 (INSULIN SYRINGE .5CC/31GX5/16\") 31G X 5/16\" 0.5 ML MISC Use with Lantus insulin BID Dx: E11.9 200 each 3    coenzyme Q10 100 MG CAPS capsule Take 200 mg by mouth daily      Ascorbic Acid (VITAMIN C) 500 MG tablet Take 500 mg by mouth daily.         B Complex Vitamins (VITAMIN-B COMPLEX) TABS Take by mouth daily occasionally      APPLE CIDER VINEGAR PO Drink with water at every meal       No current facility-administered medications for this visit. Review of Systems   Constitutional: Negative for chills, diaphoresis, fatigue, fever and unexpected weight change. Eyes: Negative for visual disturbance. Respiratory: Negative for chest tightness and shortness of breath. Cardiovascular: Negative for chest pain, palpitations and leg swelling. Gastrointestinal: Negative for abdominal pain, anal bleeding, blood in stool, constipation, diarrhea, nausea and vomiting. Genitourinary: Negative for dysuria and hematuria. Musculoskeletal: Negative for neck pain. Neurological: Negative for dizziness, light-headedness and headaches. OBJECTIVE     /70 (Site: Right Upper Arm, Cuff Size: Large Adult)   Pulse 92   Temp 98.6 °F (37 °C) (Oral)   Resp 16   Wt 227 lb (103 kg)   BMI 36.92 kg/m²   Body mass index is 36.92 kg/m². BP Readings from Last 3 Encounters:   02/18/20 118/70   12/27/19 126/82   11/11/19 128/78       Physical Exam  Vitals signs and nursing note reviewed. Constitutional:       Appearance: She is well-developed. HENT:      Head: Normocephalic and atraumatic. Right Ear: External ear normal.      Left Ear: External ear normal.      Nose: Nose normal.   Eyes:      Conjunctiva/sclera: Conjunctivae normal.      Pupils: Pupils are equal, round, and reactive to light. Neck:      Musculoskeletal: Normal range of motion and neck supple. Cardiovascular:      Rate and Rhythm: Normal rate and regular rhythm. Heart sounds: Murmur ( 1-2/6 MATT @ RUSB) present. Pulmonary:      Effort: Pulmonary effort is normal.      Breath sounds: Normal breath sounds. Abdominal:      General: Bowel sounds are normal.      Palpations: Abdomen is soft. Musculoskeletal: Normal range of motion. Skin:     General: Skin is warm and dry. Comments: Scattered mildly erythematous macular lesions on bilateral anterior neck.   No bleeding or drainage. Neurological:      Mental Status: She is alert and oriented to person, place, and time. Deep Tendon Reflexes: Reflexes are normal and symmetric. Psychiatric:         Behavior: Behavior normal.         Thought Content:  Thought content normal.         Judgment: Judgment normal.       Labs 2/15/2020  HGA1C- 7.6  TC- 179  D-LDL- 110  LFT's- all ok, except ALK PHOS- 151  CBC-ok     Lab Results   Component Value Date    LABA1C 7.6 (H) 10/26/2019     Lab Results   Component Value Date    CHOL 248 (H) 10/26/2019    TRIG 91 07/20/2019    HDL 62 07/20/2019    LDLCALC 158 (H) 07/20/2019    LDLDIRECT 172 (H) 10/26/2019       The 10-year ASCVD risk score (Erum Carpio, et al., 2013) is: 2.3%    Values used to calculate the score:      Age: 47 years      Sex: Female      Is Non- : No      Diabetic: Yes      Tobacco smoker: No      Systolic Blood Pressure: 441 mmHg      Is BP treated: No      HDL Cholesterol: 62 mg/dL      Total Cholesterol: 248 mg/dL    Lab Results   Component Value Date     07/20/2019    K 4.8 07/20/2019    CL 99 07/20/2019    CO2 27 07/20/2019    BUN 10 07/20/2019    CREATININE 0.59 07/20/2019    GLUCOSE 155 (H) 07/20/2019    CALCIUM 9.1 07/20/2019    PROT 6.7 07/20/2019    LABALBU 3.7 07/20/2019    BILITOT 0.6 07/20/2019    ALKPHOS 156 (H) 10/26/2019    AST 31 07/20/2019    ALT 13 07/20/2019     Component      Latest Ref Rng & Units 3/23/2019           9:51 AM   Creatine, Urine      28 - 217 mg/dL 31.2   Microalb, Ur      1.2 - 40.0 mg/dL <1.2 (L)   Microalbumin Creatinine Ratio      <30 mg/g UNABLE TO CALCULATE       Lab Results   Component Value Date    TSH 2.71 07/20/2019    T4FREE 0.82 07/20/2019           Immunization History   Administered Date(s) Administered    Influenza 12/01/2012, 11/29/2013    Influenza A (U4X5-29) Vaccine PF IM 10/26/2009    Influenza Vaccine, unspecified formulation 11/09/2015, 10/03/2016, 10/23/2017    Influenza SHINGLES SHOT [facility]) - check with insurance re coverage and location of administration. (updated 2/18/2020)  PAP/ PELVIC done per [facility]- last appt 7/30/2019- to follow up annually. MAMMO done 8/1/2019- to do again after 8/1/2020   COLONOSCOPY done 12/17/2019 per Dr. Edwina Canseco- to do again in 12/2029. DILATED EYE EXAM- 5/15/2019- Dr. Jeancarlos Gross- all ok- to do annually. (updated 2/18/2020)  Home BP's- call if > 140/90 on a regular basis. Continue to work on diet, exercise, and weight loss for optimal cardiovascular health  After discussion with pt, will continue Lantus at current doses   After discussion with pt, will increase Lipitor to 40 mg- 1 pill nightly as pt assures me she will take on a regular basis. #90/3 refills. Check HGA1C, D-LDL, LFT's, and SPOT MA in 3 months  OK to use 1% Hydrocortisone applied 2x/day to affected area of neck for 7-10 days. Continue current medicines otherwise. No other refills needed.   Follow up in 3 months        Electronically signed by Amrai Caruso MD on 2/18/2020 at 2:00 PM

## 2020-02-25 ENCOUNTER — PATIENT MESSAGE (OUTPATIENT)
Dept: FAMILY MEDICINE CLINIC | Age: 50
End: 2020-02-25

## 2020-02-25 DIAGNOSIS — M79.673 PAIN OF FOOT, UNSPECIFIED LATERALITY: Primary | ICD-10-CM

## 2020-02-26 NOTE — TELEPHONE ENCOUNTER
Please check with pt exactly how it needs written and where to send it. [id number] Anna Gonzalez from my standpoint.   ES

## 2020-05-28 LAB
ALBUMIN SERPL-MCNC: 3.8 G/DL (ref 3.2–5.3)
ALK PHOSPHATASE: 164 U/L (ref 39–130)
ALT SERPL-CCNC: 9 U/L (ref 0–31)
AST SERPL-CCNC: 13 U/L (ref 0–41)
AVERAGE GLUCOSE: 194 MG/DL
BILIRUB SERPL-MCNC: 0.6 MG/DL (ref 0.3–1.2)
BILIRUBIN DIRECT: 0.1 MG/DL (ref 0–0.4)
CREATINE, URINE: 69.08 MG/DL
HBA1C MFR BLD: 8.4 % (ref 4.4–6.4)
LDL CHOLESTEROL DIRECT: 106 MG/DL
MICROALBUMIN/CREAT 24H UR: <0.7 MG/DL (ref 0–1.9)
MICROALBUMIN/CREAT UR-RTO: <2.9 MG/G CREAT (ref 0–30)
TOTAL PROTEIN: 6.8 G/DL (ref 6–8)

## 2020-06-03 NOTE — PROGRESS NOTES
needed.   Follow up in 3 months        Electronically signed by Sharon Horowitz MD on 6/4/2020 at 4:16 PM

## 2020-06-04 ENCOUNTER — TELEMEDICINE (OUTPATIENT)
Dept: FAMILY MEDICINE CLINIC | Age: 50
End: 2020-06-04

## 2020-06-04 PROCEDURE — 99214 OFFICE O/P EST MOD 30 MIN: CPT | Performed by: FAMILY MEDICINE

## 2020-06-04 PROCEDURE — 3052F HG A1C>EQUAL 8.0%<EQUAL 9.0%: CPT | Performed by: FAMILY MEDICINE

## 2020-06-04 ASSESSMENT — ENCOUNTER SYMPTOMS
SHORTNESS OF BREATH: 0
VOMITING: 0
ABDOMINAL PAIN: 0
CONSTIPATION: 0
BLOOD IN STOOL: 0
ANAL BLEEDING: 0
CHEST TIGHTNESS: 0
NAUSEA: 0
DIARRHEA: 0

## 2020-08-25 ENCOUNTER — PATIENT MESSAGE (OUTPATIENT)
Dept: FAMILY MEDICINE CLINIC | Age: 50
End: 2020-08-25

## 2020-08-25 RX ORDER — SYRINGE-NEEDLE,INSULIN,0.5 ML 29 GAUGE
SYRINGE, EMPTY DISPOSABLE MISCELLANEOUS
Qty: 100 EACH | Refills: 3 | Status: SHIPPED | OUTPATIENT
Start: 2020-08-25 | End: 2020-09-25 | Stop reason: SDUPTHER

## 2020-08-25 NOTE — TELEPHONE ENCOUNTER
From: [employer]  To: Lashonda Chavez MD  Sent: 8/25/2020 11:37 AM EDT  Subject: Prescription Question    I need a script for syringes at [facility] [place] [address].  1/2 cc, 30 gauge (reli on brand please)

## 2020-09-21 LAB
ALBUMIN SERPL-MCNC: 3.9 G/DL (ref 3.2–5.3)
ALK PHOSPHATASE: 169 U/L (ref 39–130)
ALT SERPL-CCNC: 17 U/L (ref 0–31)
ANION GAP SERPL CALCULATED.3IONS-SCNC: 10 MMOL/L (ref 5–15)
AST SERPL-CCNC: 17 U/L (ref 0–41)
AVERAGE GLUCOSE: 194 MG/DL
BILIRUB SERPL-MCNC: 0.6 MG/DL (ref 0.3–1.2)
BUN BLDV-MCNC: 13 MG/DL (ref 5–23)
CALCIUM SERPL-MCNC: 9 MG/DL (ref 8.5–10.5)
CHLORIDE BLD-SCNC: 99 MMOL/L (ref 98–109)
CHOLESTEROL/HDL RATIO: 3.1 (ref 1–5)
CHOLESTEROL: 170 MG/DL (ref 150–200)
CO2: 28 MMOL/L (ref 22–32)
CREAT SERPL-MCNC: 0.72 MG/DL (ref 0.4–1)
EGFR AFRICAN AMERICAN: >60 ML/MIN/1.73SQ.M
EGFR IF NONAFRICAN AMERICAN: >60 ML/MIN/1.73SQ.M
GLUCOSE: 198 MG/DL (ref 65–99)
HBA1C MFR BLD: 8.4 % (ref 4.4–6.4)
HDLC SERPL-MCNC: 55 MG/DL
LDL CHOLESTEROL CALCULATED: 91 MG/DL
LDL/HDL RATIO: 1.7
POTASSIUM SERPL-SCNC: 3.9 MMOL/L (ref 3.5–5)
SODIUM BLD-SCNC: 137 MMOL/L (ref 134–146)
T4 FREE: 0.91 NG/DL (ref 0.61–1.6)
TOTAL PROTEIN: 7.1 G/DL (ref 6–8)
TRIGL SERPL-MCNC: 119 MG/DL (ref 27–150)
TSH SERPL DL<=0.05 MIU/L-ACNC: 2.18 UIU/ML (ref 0.49–4.67)
VLDLC SERPL CALC-MCNC: 24 MG/DL (ref 0–30)

## 2020-09-23 ENCOUNTER — HOSPITAL ENCOUNTER (OUTPATIENT)
Dept: WOMENS IMAGING | Age: 50
Discharge: HOME | End: 2020-09-23
Payer: COMMERCIAL

## 2020-09-23 PROCEDURE — 77063 BREAST TOMOSYNTHESIS BI: CPT

## 2020-09-24 NOTE — PROGRESS NOTES
[facility]  [address] [place] [address] Rd., [phone] [id number]  Dept: [phone]  Dept Fax: [phone]    Daniel Chatterjee is a 47 y.o. female being evaluated by a Virtual Visit (video visit) encounter to address concerns as mentioned above. A caregiver was present when appropriate. Due to this being a TeleHealth encounter (During EYSAF-59 public health emergency), evaluation of the following organ systems was limited: Vitals/Constitutional/EENT/Resp/CV/GI//MS/Neuro/Skin/Heme-Lymph-Imm. Pursuant to the emergency declaration under the [address] [place] [address], [address] [place] [address] authority and the [employer] Act, this Virtual Visit was conducted with patient's (and/or legal guardian's) consent, to reduce the patient's risk of exposure to COVID-19 and provide necessary medical care. The patient (and/or legal guardian) has also been advised to contact this office for worsening conditions or problems, and seek emergency medical treatment and/or call 911 if deemed necessary. Patient identification was verified at the start of the visit: Yes    Total time spent for this encounter: > 25 minutes     Services were provided through a video synchronous discussion virtually to substitute for in-person clinic visit. Patient and provider were located at their individual homes. --Jt Espana MD on 9/25/2020 at 1:43 PM    An electronic signature was used to authenticate this note. Duncan Nazario is a 47 y. o.female    Pt presents for follow up of IDDM, Hyperlipidemia, elevated ALK PHOS, and Allergies    Pt feeling ok since last visit- interval history and any new issues noted below:     Glucometer readings at home are running \"better\" since I am eating on a regular basis. Checking 4-5x/day. Pt currently still laid off from work- pt still has not heard anything from employer.   Pt still getting up a regular time and getting on with day. The home BP readings have not been checked recently. Pt currently in [place], helping niece, whose house was recently hit by [facility]. Patient Active Problem List   Diagnosis    Hyperlipidemia    Alkaline phosphatase raised    Insulin dependent diabetes mellitus (HCC)    Allergic rhinitis due to animal hair and dander     Current Outpatient Medications   Medication Sig Dispense Refill    aspirin 81 MG EC tablet Take 81 mg by mouth daily      Insulin Syringe-Needle U-100 (RELI-ON INS SYR .5CC/30G) 30G 0.5 ML MISC Reli-on insulin syringe/needle (or brand per pt preference). Use daily with Lantus. 100 each 3    atorvastatin (LIPITOR) 40 MG tablet Take 1 tablet by mouth daily 90 tablet 3    lisinopril (PRINIVIL;ZESTRIL) 5 MG tablet Take 1 tablet by mouth daily 90 tablet 3    insulin lispro (HUMALOG KWIKPEN) 100 UNIT/ML pen Inject 7 units with breakfast, 10 units with lunch and dinner plus STR group 1 scale with a maximum of 50 units per day total 15 pen 3    insulin glargine (LANTUS) 100 UNIT/ML injection vial 15 units am and 16 units nightly 4 vial 3    APPLE CIDER VINEGAR PO Drink with water at every meal      loratadine (CLARITIN) 10 MG tablet Take 10 mg by mouth nightly       Insulin Pen Needle (PEN NEEDLES 31GX5/16\") 31G X 8 MM MISC Use with Humalog KwikPen Dx: E11.9 100 each 5    coenzyme Q10 100 MG CAPS capsule Take 200 mg by mouth daily      Ascorbic Acid (VITAMIN C) 500 MG tablet Take 500 mg by mouth daily. No current facility-administered medications for this visit. Review of Systems   Constitutional: Negative for chills, diaphoresis, fatigue, fever and unexpected weight change. Eyes: Negative for visual disturbance. Respiratory: Negative for chest tightness and shortness of breath. Cardiovascular: Negative for chest pain, palpitations and leg swelling.    Gastrointestinal: Negative for abdominal pain, anal bleeding, blood in stool, constipation, diarrhea, nausea and vomiting. Genitourinary: Negative for dysuria and hematuria. Musculoskeletal: Negative for neck pain. Neurological: Negative for dizziness, light-headedness and headaches. OBJECTIVE     Due to this being a TeleHealth encounter, evaluation of the following organ systems is limited: Vitals/Constitutional/EENT/Resp/CV/GI//MS/Neuro/Skin/Heme-Lymph-Imm. PHYSICAL EXAMINATION:  [ INSTRUCTIONS:  \"[x]\" Indicates a positive item  \"[]\" Indicates a negative item  -- DELETE ALL ITEMS NOT EXAMINED]  Vital Signs: (All Vital Signs are pt reported, unless otherwise noted)   There were no vitals taken for this visit. Constitutional: [x] Appears well-developed and well-nourished [x] No apparent distress      [] Abnormal-   Mental status  [x] Alert and awake  [x] Oriented to person/place/time [x]Able to follow commands      Eyes:  EOM    [x]  Normal  [] Abnormal-  Sclera  [x]  Normal  [] Abnormal -         Discharge [x]  None visible  [] Abnormal -    HENT:   [x] Normocephalic, atraumatic.   [] Abnormal   [] Mouth/Throat: Mucous membranes are moist.     External Ears [x] Normal  [] Abnormal-     Neck: [x] No visualized mass     Pulmonary/Chest: [x] Respiratory effort normal.  [x] No visualized signs of difficulty breathing or respiratory distress        [] Abnormal-      Musculoskeletal:   [] Normal gait with no signs of ataxia         [x] Normal range of motion of neck        [] Abnormal-       Neurological:        [x] No Facial Asymmetry (Cranial nerve 7 motor function) (limited exam to video visit)          [x] No gaze palsy        [] Abnormal-         Skin:        [x] No significant exanthematous lesions or discoloration noted on facial skin         [] Abnormal-            Psychiatric:       [x] Normal Affect [x] No Hallucinations        [] Abnormal-     Other pertinent observable physical exam findings-       Component      Latest Ref Rng & Units 9/21/2020   Glucose      65 - 99 mg/dL 198 (H)   BUN      5 - 23 mg/dL 13   Creatinine      0.40 - 1.00 mg/dL 0.72   eGFR African American      >59 ml/min/1.73sq.m >60   EGFR IF NonAfrican American      >59 ml/min/1.73sq.m >60   Calcium      8.5 - 10.5 mg/dL 9.0   Sodium      134 - 146 mmol/L 137   Potassium      3.5 - 5.0 mmol/L 3.9   Chloride      98 - 109 mmol/L 99   CO2      22 - 32 mmol/L 28   Anion Gap      5 - 15 mmol/L 10   Bilirubin      0.3 - 1.2 mg/dL 0.6   Alk Phosphatase      39 - 130 U/L 169 (H)   AST      0 - 41 U/L 17   ALT      0 - 31 U/L 17   Total Protein      6.0 - 8.0 g/dL 7.1   Albumin      3.2 - 5.3 g/dL 3.9   Cholesterol      150 - 200 mg/dL 170   Triglycerides      27 - 150 mg/dL 119   HDL Cholesterol      >39 mg/dL 55   LDL Calculated      <130 mg/dL 91   VLDL Cholesterol Calculated      0 - 30 mg/dL 24   LDl/HDL Ratio      <3.5 1.7   Chol/HDL Ratio      1.0 - 5.0 3.1   Hemoglobin A1C     4.4 - 6.4 % 8.4 (H)   AVERAGE GLUCOSE      mg/dL 194   T4 Free      0.61 - 1.60 ng/dL 0.91   TSH      0.49 - 4.67 uIU/mL 2.18       Lab Results   Component Value Date    LABA1C 8.4 (H) 09/21/2020         The 10-year ASCVD risk score (Noemy Perdomo, et al., 2013) is: 1.6%    Values used to calculate the score:      Age: 47 years      Sex: Female      Is Non- : No      Diabetic: Yes      Tobacco smoker: No      Systolic Blood Pressure: 770 mmHg      Is BP treated: No      HDL Cholesterol: 55 mg/dL      Total Cholesterol: 170 mg/dL      Component      Latest Ref Rng & Units 5/27/2020   Creatine, Urine      mg/dL 69.08   Microalb, Ur      0.0 - 1.9 mg/dL <0.7   Microalbumin Creatinine Ratio      0.0 - 30.0 mg/g creat <2.9       No results found for: WBC, HGB, HCT, MCV, PLT      Immunization History   Administered Date(s) Administered    Influenza 12/01/2012, 11/29/2013    Influenza A (O2A3-45) Vaccine PF IM 10/26/2009    Influenza Vaccine, unspecified formulation 11/09/2015, 10/03/2016, 10/23/2017    Influenza Virus Vaccine 12/01/2012, 11/29/2013, 12/10/2014, 11/09/2015, 10/23/2017, 10/24/2018, 10/25/2019    Influenza Whole 10/24/2008    Influenza, Quadv, IM, (6 mo and older Fluzone, Flulaval, Fluarix and 3 yrs and older Afluria) 10/03/2016    Influenza, Quadv, IM, PF (6 mo and older Fluzone, Flulaval, Fluarix, and 3 yrs and older Afluria) 10/23/2018    Pneumococcal Polysaccharide ([id number]) 08/03/2011    Td, unspecified formulation 01/01/2008    Tdap (Boostrix, Adacel) 06/19/2009, 09/13/2017       Health Maintenance   Topic Date Due    Shingles Vaccine (1 of 2) 09/30/2019    Cervical cancer screen  05/30/2020    Diabetic foot exam  08/06/2020    Flu vaccine (1) 09/01/2020    Hepatitis B vaccine (1 of 3 - Risk 3-dose series) 04/10/2021 (Originally 9/30/1989)    A1C test (Diabetic or Prediabetic)  12/21/2020    Statin Therapy  02/18/2021    Diabetic retinal exam  05/21/2021    Diabetic microalbuminuria test  05/27/2021    Lipid screen  09/21/2021    Potassium monitoring  09/21/2021    Creatinine monitoring  09/21/2021    Breast cancer screen  09/23/2022    DTaP/Tdap/Td vaccine (3 - Td) 09/13/2027    Colon cancer screen colonoscopy  12/17/2029    Pneumococcal 0-64 years Vaccine  Completed    HIV screen  Addressed    Hepatitis A vaccine  Aged Out    Hib vaccine  Aged Out    Meningococcal (ACWY) vaccine  Aged Out       AAA ultrasound (Male, 65-75, smoked ever) indicated at this time? NO tobacco history  CT Lung Screen (55-80, 30 pk-yrs, smoking or quit <15 years) indicated at this time? NO tobacco history    Future Appointments   Date Time Provider Adela Grace   1/8/2021  1:15 PM Juan Marquez, [id number]       ASSESSMENT       Diagnosis Orders   1. Insulin dependent diabetes mellitus (HCC)  Hemoglobin A1C    Insulin Syringe-Needle U-100 (RELI-ON INS SYR .5CC/30G) 30G 0.5 ML MISC   2. Hyperlipidemia, unspecified hyperlipidemia type  LDL Cholesterol, Direct   3.  Alkaline phosphatase raised  Alkaline Phosphatase   4. Allergic rhinitis due to animal hair and dander       PLAN      Encouraged annual FLU VACCINE- to be done 10/14/2020 at [facility]. [address] #1 to be done 10/14/2020 at [facility]. SHINGRIX #2 will be due 2-6 months later. PAP/ PELVIC done per [facility]- last appt 8/25/2020- to follow up annually. (updated 9/25/2020)  MAMMO due after 9/23/2021  COLONOSCOPY done 12/17/2019 per Dr. Tammy Hu- to do again in 12/2029.  (updated 9/25/2020)  Rhoda Sever EXAM- 5/21/2020- Dr. James Olson- all ok- to do annually. (updated 9/25/2020)  Home BP's- call if > 140/90 on a regular basis. (updated 9/25/2020)  After discussion with pt, will hold on changing insulin regimen, as pt states she is feeling better, more active, ans sugars are improving now over previous 3 months. Continue to work on diet, exercise, and weight loss for optimal cardiovascular health  After discussion with pt, will hold on referral back to Dr. Tammy Hu for elevated ALK PHOS as values stable for many years. (updated 9/25/2020)   Check HGA1C, D-LDL, ALK PHOS in 3 months  Continue current medicines otherwise. No refills needed.   Follow up in 3 months      Electronically signed by Manolo Brooks MD on 9/25/2020 at 1:43 PM

## 2020-09-25 ENCOUNTER — TELEMEDICINE (OUTPATIENT)
Dept: FAMILY MEDICINE CLINIC | Age: 50
End: 2020-09-25

## 2020-09-25 PROCEDURE — 99214 OFFICE O/P EST MOD 30 MIN: CPT | Performed by: FAMILY MEDICINE

## 2020-09-25 PROCEDURE — 3052F HG A1C>EQUAL 8.0%<EQUAL 9.0%: CPT | Performed by: FAMILY MEDICINE

## 2020-09-25 RX ORDER — SYRINGE-NEEDLE,INSULIN,0.5 ML 29 GAUGE
SYRINGE, EMPTY DISPOSABLE MISCELLANEOUS
Qty: 100 EACH | Refills: 3 | Status: SHIPPED | OUTPATIENT
Start: 2020-09-25 | End: 2021-01-08

## 2020-09-25 RX ORDER — ASPIRIN 81 MG/1
81 TABLET ORAL DAILY
COMMUNITY

## 2020-09-25 ASSESSMENT — ENCOUNTER SYMPTOMS
ABDOMINAL PAIN: 0
DIARRHEA: 0
ANAL BLEEDING: 0
BLOOD IN STOOL: 0
SHORTNESS OF BREATH: 0
CHEST TIGHTNESS: 0
VOMITING: 0
CONSTIPATION: 0
NAUSEA: 0

## 2020-09-25 NOTE — PATIENT INSTRUCTIONS
Encouraged annual FLU VACCINE- to be done 10/14/2020 at [facility]. [address] #1 to be done 10/14/2020 at [facility]. SHINGRIX #2 will be due 2-6 months later. PAP/ PELVIC done per [facility]- last appt 8/25/2020- to follow up annually. (updated 9/25/2020)  MAMMO due after 9/23/2021  COLONOSCOPY done 12/17/2019 per Dr. Alyx Chau- to do again in 12/2029.  (updated 9/25/2020)  Shantal Mas EXAM- 5/21/2020- Dr. Veronica Chapa- all ok- to do annually. (updated 9/25/2020)  Home BP's- call if > 140/90 on a regular basis. (updated 9/25/2020)  After discussion with pt, will hold on changing insulin regimen, as pt states she is feeling better, more active, ans sugars are improving now over previous 3 months. Continue to work on diet, exercise, and weight loss for optimal cardiovascular health  After discussion with pt, will hold on referral back to Dr. Alyx Chau for elevated ALK PHOS as values stable for many years. (updated 9/25/2020)   Check HGA1C, D-LDL, ALK PHOS in 3 months  Continue current medicines otherwise. No refills needed.   Follow up in 3 months 
decreased balance during turns/decreased step length/stand pivot/decreased weight-shifting ability

## 2021-01-03 LAB
ALK PHOSPHATASE: 167 U/L (ref 39–130)
AVERAGE GLUCOSE: 180 MG/DL
HBA1C MFR BLD: 7.9 % (ref 4.4–6.4)
LDL CHOLESTEROL DIRECT: 98 MG/DL

## 2021-01-08 ENCOUNTER — TELEPHONE (OUTPATIENT)
Dept: FAMILY MEDICINE CLINIC | Age: 51
End: 2021-01-08

## 2021-01-08 ENCOUNTER — OFFICE VISIT (OUTPATIENT)
Dept: FAMILY MEDICINE CLINIC | Age: 51
End: 2021-01-08
Payer: MEDICAID

## 2021-01-08 VITALS
SYSTOLIC BLOOD PRESSURE: 108 MMHG | WEIGHT: 234 LBS | RESPIRATION RATE: 16 BRPM | DIASTOLIC BLOOD PRESSURE: 64 MMHG | TEMPERATURE: 96.4 F | HEART RATE: 84 BPM | BODY MASS INDEX: 38.06 KG/M2

## 2021-01-08 DIAGNOSIS — E10.9 TYPE 1 DIABETES MELLITUS WITHOUT COMPLICATION (HCC): Primary | ICD-10-CM

## 2021-01-08 DIAGNOSIS — E66.01 CLASS 2 SEVERE OBESITY DUE TO EXCESS CALORIES WITH SERIOUS COMORBIDITY AND BODY MASS INDEX (BMI) OF 38.0 TO 38.9 IN ADULT (HCC): ICD-10-CM

## 2021-01-08 DIAGNOSIS — R74.8 ALKALINE PHOSPHATASE RAISED: ICD-10-CM

## 2021-01-08 DIAGNOSIS — E78.5 HYPERLIPIDEMIA, UNSPECIFIED HYPERLIPIDEMIA TYPE: ICD-10-CM

## 2021-01-08 PROCEDURE — 3051F HG A1C>EQUAL 7.0%<8.0%: CPT | Performed by: NURSE PRACTITIONER

## 2021-01-08 PROCEDURE — 99214 OFFICE O/P EST MOD 30 MIN: CPT | Performed by: NURSE PRACTITIONER

## 2021-01-08 RX ORDER — NAPROXEN SODIUM 220 MG
TABLET ORAL
Qty: 200 EACH | Refills: 3 | Status: SHIPPED | OUTPATIENT
Start: 2021-01-08 | End: 2021-01-11

## 2021-01-08 NOTE — PATIENT INSTRUCTIONS
Culturelle  Lanie  Florastor      PAP/ PELVIC done per [facility]- last appt 8/25/2020- to follow up annually. (updated 9/25/2020)  MAMMO due after 9/23/2021  Diabetic foot exam completed today  COLONOSCOPY done 12/17/2019 per Dr. Carolin Schumacher- to do again in 12/2029.  (updated 1/8/2021)  [place] EXAM- 5/21/2020- Dr. Noelle Bell- all ok- to do annually. (updated 1/8/2021)  Home BP's- call if > 140/90 on a regular basis. (updated 1/8/2021)  After discussion with pt, will hold on changing insulin regimen, as she would like to continue to work on diet and increase physical activity  Continue to work on diet, exercise, and weight loss for optimal cardiovascular health  Alk phos is stable at this time. Will continue to monitor. After discussion with patient, will hold on increasing atorvastatin and she will work on increasing physical activity  Check HGA1C, CMP, Spot MA, LDL in 3 months  Continue current medicines otherwise.   Refills sent  Follow up in 3 months

## 2021-01-08 NOTE — TELEPHONE ENCOUNTER
PATIENT: Cj Fitzpatrick     PROVIDER: Mai Hankins CNP    PATIENT SAYS THAT YOU PUT IN THE WRONG SIZE ANNA ORDER TO HER PHARMACY THAT ANNA IS SUPPOSE TO BE A 30 NOT A 31 PLEASE GIVE CALL TO PATIENT AS SOON AS POSSIBLE

## 2021-01-08 NOTE — PROGRESS NOTES
Chief Complaint   Patient presents with    Diabetes     3 month check up. labs done. Garrison Arnold is a 45 y. o.female      Here for follow up of chronic health problems including:    Patient Active Problem List   Diagnosis    Hyperlipidemia    Alkaline phosphatase raised    Insulin dependent diabetes mellitus    Allergic rhinitis due to animal hair and dander     Glucometer readings at home are fluctuating. Ranging . Checking 4-5x/day. The home BP readings have not been checked recently. Pt is taking atorvastatin 40 mg daily. Would like to avoid increasing this. Weight increased 7# in last 11 months    Review of Systems   Constitutional: Negative for chills, diaphoresis and fever. Respiratory: Negative for shortness of breath. Cardiovascular: Negative for chest pain, palpitations and leg swelling. Gastrointestinal: Negative for blood in stool, constipation, diarrhea, nausea and vomiting. Genitourinary: Negative for dysuria and hematuria. Musculoskeletal: Negative for myalgias. Neurological: Negative for dizziness and headaches. All other systems reviewed and are negative. OBJECTIVE     /64   Pulse 84   Temp 96.4 °F (35.8 °C)   Resp 16   Wt 234 lb (106.1 kg)   BMI 38.06 kg/m²     Wt Readings from Last 3 Encounters:   01/08/21 234 lb (106.1 kg)   02/18/20 227 lb (103 kg)   12/27/19 226 lb 9.6 oz (102.8 kg)       Physical Exam  Vitals signs and nursing note reviewed. Constitutional:       Appearance: She is well-developed. HENT:      Head: Normocephalic and atraumatic. Right Ear: External ear normal.      Left Ear: External ear normal.      Nose: Nose normal.   Eyes:      Conjunctiva/sclera: Conjunctivae normal.      Pupils: Pupils are equal, round, and reactive to light. Neck:      Musculoskeletal: Normal range of motion and neck supple. Cardiovascular:      Rate and Rhythm: Normal rate and regular rhythm. Heart sounds: Normal heart sounds. Pulmonary:      Effort: Pulmonary effort is normal.      Breath sounds: Normal breath sounds. Abdominal:      General: Bowel sounds are normal.      Palpations: Abdomen is soft. Musculoskeletal: Normal range of motion. Feet:      Right foot:      Protective Sensation: 5 sites tested. 5 sites sensed. Skin integrity: Skin integrity normal.      Left foot:      Protective Sensation: 5 sites tested. 5 sites sensed. Skin integrity: Skin integrity normal.   Skin:     General: Skin is warm and dry. Neurological:      Mental Status: She is alert and oriented to person, place, and time. Deep Tendon Reflexes: Reflexes are normal and symmetric. Psychiatric:         Behavior: Behavior normal.         Thought Content:  Thought content normal.         Judgment: Judgment normal.           Component      Latest Ref Rng & Units 1/2/2021 1/2/2021 1/2/2021          11:44 AM 11:44 AM 11:44 AM   Hemoglobin A1C      4.4 - 6.4 %   7.9 (H)   AVERAGE GLUCOSE      mg/dL   180   LDL Direct      <130 mg/dL  98    Alk Phosphatase      39 - 130 U/L 167 (H)       Component      Latest Ref Rng & Units 1/2/2021 9/21/2020 5/27/2020 2/15/2020          11:44 AM  9:16 AM 11:07 AM 12:00 AM   Hemoglobin A1C      4.4 - 6.4 % 7.9 (H) 8.4 (H) 8.4 (H) 7.6   AVERAGE GLUCOSE      mg/dL 180 194 194 171     Immunization History   Administered Date(s) Administered    Influenza 12/01/2012, 11/29/2013    Influenza A ([id number]) Vaccine PF IM 10/26/2009    Influenza Vaccine, unspecified formulation 11/09/2015, 10/03/2016, 10/23/2017    Influenza Virus Vaccine 12/01/2012, 11/29/2013, 12/10/2014, 11/09/2015, 10/23/2017, 10/24/2018, 10/25/2019, 10/14/2020    Influenza Whole 10/24/2008    Influenza, Quadv, IM, (6 mo and older Fluzone, Flulaval, Fluarix and 3 yrs and older Afluria) 10/03/2016  Influenza, Quadv, IM, PF (6 mo and older Fluzone, Flulaval, Fluarix, and 3 yrs and older Afluria) 10/23/2018    Pneumococcal Polysaccharide ([id number]) 08/03/2011    Td, unspecified formulation 01/01/2008    Tdap (Boostrix, Adacel) 06/19/2009, 09/13/2017    Zoster Recombinant (Shingrix) 10/14/2020, 12/09/2020         Health Maintenance   Topic Date Due    Cervical cancer screen  05/30/2020    Hepatitis B vaccine (1 of 3 - Risk 3-dose series) 04/10/2021 (Originally 9/30/1989)    A1C test (Diabetic or Prediabetic)  04/02/2021    Diabetic retinal exam  05/21/2021    Diabetic microalbuminuria test  05/27/2021    Potassium monitoring  09/21/2021    Creatinine monitoring  09/21/2021    Lipid screen  01/02/2022    Diabetic foot exam  01/08/2022    Breast cancer screen  09/23/2022    DTaP/Tdap/Td vaccine (3 - Td) 09/13/2027    Colon cancer screen colonoscopy  12/17/2029    Flu vaccine  Completed    Shingles Vaccine  Completed    Pneumococcal 0-64 years Vaccine  Completed    Hepatitis C screen  Completed    HIV screen  Addressed    Hepatitis A vaccine  Aged Out    Hib vaccine  Aged Out    Meningococcal (ACWY) vaccine  Aged Out       Future Appointments   Date Time Provider Adela Grace   4/26/2021  1:15 PM Madison Alas MD [address] [facility]         ASSESSMENT       Diagnosis Orders   1. Type 1 diabetes mellitus without complication (HCC)  Hemoglobin A1C    Comprehensive Metabolic Panel    Microalbumin / Creatinine Urine Ratio    Insulin Syringe-Needle U-100 (INSULIN SYRINGE .5CC/31GX5/16\") 31G X 5/16\" 0.5 ML MISC     DIABETES FOOT EXAM   2. Hyperlipidemia, unspecified hyperlipidemia type  Comprehensive Metabolic Panel    LDL Cholesterol, Direct   3. Alkaline phosphatase raised  Comprehensive Metabolic Panel   4.  Class 2 severe obesity due to excess calories with serious comorbidity and body mass index (BMI) of 38.0 to 38.9 in adult [facility])         PLAN Requested Prescriptions     Signed Prescriptions Disp Refills    Insulin Syringe-Needle U-100 (INSULIN SYRINGE .5CC/31GX5/16\") 31G X 5/16\" 0.5 ML MISC 200 each 3     Sig: Brand per pt preference. Sign Use with Lantus insulin BID Dx: E11.9       Orders Placed This Encounter   Procedures    Hemoglobin A1C     Standing Status:   Future     Standing Expiration Date:   1/8/2022    Comprehensive Metabolic Panel     Standing Status:   Future     Standing Expiration Date:   1/8/2022    Microalbumin / Creatinine Urine Ratio     Standing Status:   Future     Standing Expiration Date:   1/8/2022    LDL Cholesterol, Direct     Standing Status:   Future     Standing Expiration Date:   1/8/2022     DIABETES FOOT EXAM       PAP/ PELVIC done per [facility]- last appt 8/25/2020- to follow up annually. (updated 9/25/2020)  MAMMO due after 9/23/2021  Diabetic foot exam completed today  COLONOSCOPY done 12/17/2019 per Dr. Carolin Schumacher- to do again in 12/2029.  (updated 1/11/2021)  Figueroa Mao EXAM- 5/21/2020- Dr. Noelle Bell- all ok- to do annually. (updated 1/11/2021)  Home BP's- call if > 140/90 on a regular basis. (updated 1/11/2021)  After discussion with pt, will hold on changing insulin regimen, as she would like to continue to work on diet and increase physical activity  Continue to work on diet, exercise, and weight loss for optimal cardiovascular health  Alk phos is stable at this time. Will continue to monitor. After discussion with patient, will hold on increasing atorvastatin and she will work on increasing physical activity  Check HGA1C, CMP, Spot MA, LDL in 3 months  Continue current medicines otherwise. Refills sent  Follow up in 3 months      Return in about 3 months (around 4/8/2021).         Electronically signed by CHELO Vaca CNP on 1/11/2021 at 10:59 AM

## 2021-01-08 NOTE — TELEPHONE ENCOUNTER
Called patient to clarify size of needle. Patient not at home but will call back on Monday with info.   Await callback

## 2021-01-11 ASSESSMENT — ENCOUNTER SYMPTOMS
SHORTNESS OF BREATH: 0
BLOOD IN STOOL: 0
CONSTIPATION: 0
NAUSEA: 0
DIARRHEA: 0
VOMITING: 0

## 2021-03-23 ENCOUNTER — IMMUNIZATION (OUTPATIENT)
Dept: PRIMARY CARE CLINIC | Age: 51
End: 2021-03-23
Payer: MEDICAID

## 2021-03-23 ENCOUNTER — PATIENT MESSAGE (OUTPATIENT)
Dept: FAMILY MEDICINE CLINIC | Age: 51
End: 2021-03-23

## 2021-03-23 DIAGNOSIS — E78.5 HYPERLIPIDEMIA, UNSPECIFIED HYPERLIPIDEMIA TYPE: ICD-10-CM

## 2021-03-23 PROCEDURE — 0001A COVID-19, PFIZER VACCINE 30MCG/0.3ML DOSE: CPT | Performed by: FAMILY MEDICINE

## 2021-03-23 PROCEDURE — 91300 COVID-19, PFIZER VACCINE 30MCG/0.3ML DOSE: CPT | Performed by: FAMILY MEDICINE

## 2021-03-24 RX ORDER — ATORVASTATIN CALCIUM 40 MG/1
40 TABLET, FILM COATED ORAL DAILY
Qty: 90 TABLET | Refills: 0 | Status: SHIPPED | OUTPATIENT
Start: 2021-03-24 | End: 2021-12-01 | Stop reason: SDUPTHER

## 2021-03-24 NOTE — TELEPHONE ENCOUNTER
From: Kinsey Finley  To: CHELO Ross - CNP  Sent: 3/23/2021 6:10 PM EDT  Subject: Prescription Question    [facility]  Thank you

## 2021-04-08 ENCOUNTER — PATIENT MESSAGE (OUTPATIENT)
Dept: FAMILY MEDICINE CLINIC | Age: 51
End: 2021-04-08

## 2021-04-13 ENCOUNTER — IMMUNIZATION (OUTPATIENT)
Dept: PRIMARY CARE CLINIC | Age: 51
End: 2021-04-13
Payer: MEDICAID

## 2021-04-13 PROCEDURE — 0002A COVID-19, PFIZER VACCINE 30MCG/0.3ML DOSE: CPT | Performed by: FAMILY MEDICINE

## 2021-04-13 PROCEDURE — 91300 COVID-19, PFIZER VACCINE 30MCG/0.3ML DOSE: CPT | Performed by: FAMILY MEDICINE

## 2021-04-20 LAB
ALBUMIN SERPL-MCNC: 3.8 G/DL (ref 3.2–5.3)
ALK PHOSPHATASE: 173 U/L (ref 39–130)
ALT SERPL-CCNC: 14 U/L (ref 0–31)
ANION GAP SERPL CALCULATED.3IONS-SCNC: 9 MMOL/L (ref 5–15)
AST SERPL-CCNC: 17 U/L (ref 0–41)
AVERAGE GLUCOSE: 189 MG/DL
BILIRUB SERPL-MCNC: 0.5 MG/DL (ref 0.3–1.2)
BUN BLDV-MCNC: 12 MG/DL (ref 5–23)
CALCIUM SERPL-MCNC: 8.8 MG/DL (ref 8.5–10.5)
CHLORIDE BLD-SCNC: 98 MMOL/L (ref 98–109)
CO2: 27 MMOL/L (ref 22–32)
CREAT SERPL-MCNC: 0.75 MG/DL (ref 0.4–1)
CREATINE, URINE: 42.9 MG/DL
EGFR AFRICAN AMERICAN: >60 ML/MIN/1.73SQ.M
EGFR IF NONAFRICAN AMERICAN: >60 ML/MIN/1.73SQ.M
GLUCOSE: 297 MG/DL (ref 65–99)
HBA1C MFR BLD: 8.2 % (ref 4.4–6.4)
LDL CHOLESTEROL DIRECT: 98 MG/DL
MICROALBUMIN/CREAT 24H UR: <0.7 MG/DL (ref 0–1.9)
MICROALBUMIN/CREAT UR-RTO: NORMAL MG/G CREAT (ref 0–30)
POTASSIUM SERPL-SCNC: 4.3 MMOL/L (ref 3.5–5)
SODIUM BLD-SCNC: 134 MMOL/L (ref 134–146)
TOTAL PROTEIN: 6.8 G/DL (ref 6–8)

## 2021-04-24 NOTE — PROGRESS NOTES
[facility]  [facility]  Dept: [phone]  Dept Fax: [phone]    SUBJECTIVE     Marques Whaley is a 45 y. o.female    Pt presents for follow up of IDDM, Hyperlipidemia, elevated ALK PHOS, and Allergies    Pt feeling ok since last visit- interval history and any new issues noted below:   Plans on starting exercise program in the mornings  Taking lantus 15u AM and 16u HS; Lispro 10 AM and SSI with lunch and dinner. Concern that she will drop once starting exercise program. Eating three meals a day. Glucometer readings at home are running - AM fasting . Last night 136 prior to bed. Hypoglycemia last week, 0330 glucose 44. Wt Readings from Last 3 Encounters:   04/26/21 237 lb 12.8 oz (107.9 kg)   01/08/21 234 lb (106.1 kg)   02/18/20 227 lb (103 kg)   Weight increased 3 # since last visit 3 months ago. Patient Active Problem List   Diagnosis    Hyperlipidemia    Alkaline phosphatase raised    Insulin dependent diabetes mellitus    Allergic rhinitis due to animal hair and dander       Current Outpatient Medications   Medication Sig Dispense Refill    Probiotic Product (PROBIOTIC PO) Take by mouth      atorvastatin (LIPITOR) 40 MG tablet Take 1 tablet by mouth daily 90 tablet 0    Insulin Syringe-Needle U-100 30G X 1/2\" 0.5 ML MISC Brand per pt preference.  Use with Lantus insulin bid  Dx: E11.9 200 each 3    aspirin 81 MG EC tablet Take 81 mg by mouth daily      lisinopril (PRINIVIL;ZESTRIL) 5 MG tablet Take 1 tablet by mouth daily 90 tablet 3    insulin lispro (HUMALOG KWIKPEN) 100 UNIT/ML pen Inject 7 units with breakfast, 10 units with lunch and dinner plus STR group 1 scale with a maximum of 50 units per day total 15 pen 3    insulin glargine (LANTUS) 100 UNIT/ML injection vial 15 units am and 16 units nightly (Patient taking differently: 15 units am and 16 units nightly (taking 15 units at night if  or below)) 4 vial 3    APPLE CIDER VINEGAR PO Drink with water at every meal      loratadine (CLARITIN) 10 MG tablet Take 10 mg by mouth nightly       Insulin Pen Needle (PEN NEEDLES 31GX5/16\") 31G X 8 MM MISC Use with Humalog KwikPen Dx: E11.9 100 each 5    coenzyme Q10 100 MG CAPS capsule Take 200 mg by mouth daily      Ascorbic Acid (VITAMIN C) 500 MG tablet Take 500 mg by mouth daily. No current facility-administered medications for this visit. Review of Systems   Constitutional: Negative for chills, diaphoresis, fatigue, fever and unexpected weight change. Eyes: Negative for visual disturbance. Respiratory: Negative for chest tightness and shortness of breath. Cardiovascular: Negative for chest pain, palpitations and leg swelling. Gastrointestinal: Negative for abdominal pain, anal bleeding, blood in stool, constipation, diarrhea, nausea and vomiting. Genitourinary: Negative for dysuria and hematuria. Musculoskeletal: Negative for neck pain. Neurological: Negative for dizziness, light-headedness and headaches. OBJECTIVE     /76   Pulse 72   Resp 16   Ht 5' 5\" (1.651 m)   Wt 237 lb 12.8 oz (107.9 kg)   BMI 39.57 kg/m²   Body mass index is 39.57 kg/m². BP Readings from Last 3 Encounters:   04/26/21 128/76   01/08/21 108/64   02/18/20 118/70     Physical Exam  Vitals signs and nursing note reviewed. Constitutional:       Appearance: She is well-developed. HENT:      Head: Normocephalic and atraumatic. Right Ear: External ear normal.      Left Ear: External ear normal.      Nose: Nose normal.   Eyes:      Conjunctiva/sclera: Conjunctivae normal.      Pupils: Pupils are equal, round, and reactive to light. Neck:      Musculoskeletal: Normal range of motion and neck supple. Cardiovascular:      Rate and Rhythm: Normal rate and regular rhythm. Heart sounds: Murmur (1-2/ 6 MATT @ LUSB= RUSB) present.    Pulmonary:      Effort: Pulmonary effort is normal.      Breath sounds: Normal breath sounds. Abdominal:      General: Bowel sounds are normal.      Palpations: Abdomen is soft. Musculoskeletal: Normal range of motion. Skin:     General: Skin is warm and dry. Neurological:      Mental Status: She is alert and oriented to person, place, and time. Deep Tendon Reflexes: Reflexes are normal and symmetric. Psychiatric:         Behavior: Behavior normal.         Thought Content:  Thought content normal.         Judgment: Judgment normal.         Component      Latest Ref Rng & Units 4/20/2021   Glucose      65 - 99 mg/dL 297 (H)   BUN      5 - 23 mg/dL 12   Creatinine      0.40 - 1.00 mg/dL 0.75   eGFR African American      >59 ml/min/1.73sq.m >60   EGFR IF NonAfrican American      >59 ml/min/1.73sq.m >60   Calcium      8.5 - 10.5 mg/dL 8.8   Sodium      134 - 146 mmol/L 134   Potassium      3.5 - 5.0 mmol/L 4.3   Chloride      98 - 109 mmol/L 98   CO2      22 - 32 mmol/L 27   Anion Gap      5 - 15 mmol/L 9   Bilirubin      0.3 - 1.2 mg/dL 0.5   Alk Phosphatase      39 - 130 U/L 173 (H)   AST      0 - 41 U/L 17   ALT      0 - 31 U/L 14   Total Protein      6.0 - 8.0 g/dL 6.8   Albumin      3.2 - 5.3 g/dL 3.8   Creatine, Urine      mg/dL 42.90   Microalb, Ur      0.0 - 1.9 mg/dL <0.7   Microalbumin Creatinine Ratio      0.0 - 30.0 mg/g creat SEE NOTE   Hemoglobin A1C      4.4 - 6.4 % 8.2 (H)   AVERAGE GLUCOSE      mg/dL 189   LDL Direct      <130 mg/dL 98       Lab Results   Component Value Date    LABA1C 8.2 (H) 04/20/2021       Lab Results   Component Value Date    CHOL 170 09/21/2020    TRIG 119 09/21/2020    HDL 55 09/21/2020    LDLCALC 91 09/21/2020    LDLDIRECT 98 04/20/2021       The 10-year ASCVD risk score (Cris Gutierres et al., 2013) is: 2.1%    Values used to calculate the score:      Age: 45 years      Sex: Female      Is Non- : No      Diabetic: Yes      Tobacco smoker: No      Systolic Blood Pressure: 428 mmHg      Is BP treated: No      HDL Cholesterol: 55 mg/dL      Total Cholesterol: 170 mg/dL      Lab Results   Component Value Date     04/20/2021    K 4.3 04/20/2021    CL 98 04/20/2021    CO2 27 04/20/2021    BUN 12 04/20/2021    CREATININE 0.75 04/20/2021    GLUCOSE 297 (H) 04/20/2021    CALCIUM 8.8 04/20/2021    PROT 6.8 04/20/2021    LABALBU 3.8 04/20/2021    BILITOT 0.5 04/20/2021    ALKPHOS 173 (H) 04/20/2021    AST 17 04/20/2021    ALT 14 04/20/2021       Component      Latest Ref Rng & Units 4/20/2021   Creatine, Urine      mg/dL 42.90   Microalb, Ur      0.0 - 1.9 mg/dL <0.7   Microalbumin Creatinine Ratio      0.0 - 30.0 mg/g creat SEE NOTE         Lab Results   Component Value Date    TSH 2.18 09/21/2020    T4FREE 0.91 09/21/2020       No results found for: WBC, HGB, HCT, MCV, PLT        Immunization History   Administered Date(s) Administered    COVID-19, Pfizer, PF, 30mcg/0.3mL 03/23/2021, 04/13/2021    Influenza 12/01/2012, 11/29/2013    Influenza A ([id number]) Vaccine PF IM 10/26/2009    Influenza Vaccine, unspecified formulation 11/09/2015, 10/03/2016, 10/23/2017    Influenza Virus Vaccine 12/01/2012, 11/29/2013, 12/10/2014, 11/09/2015, 10/23/2017, 10/24/2018, 10/25/2019, 10/14/2020    Influenza Whole 10/24/2008    Influenza, Quadv, IM, (6 mo and older Fluzone, Flulaval, Fluarix and 3 yrs and older Afluria) 10/03/2016    Influenza, Quadv, IM, PF (6 mo and older Fluzone, Flulaval, Fluarix, and 3 yrs and older Afluria) 10/23/2018, 10/14/2020    Pneumococcal Polysaccharide ([id number]) 08/03/2011    Td, unspecified formulation 01/01/2008    Tdap (Boostrix, Adacel) 06/19/2009, 09/13/2017    Zoster Recombinant (Shingrix) 10/14/2020, 12/09/2020       Health Maintenance   Topic Date Due    Hepatitis B vaccine (1 of 3 - Risk 3-dose series) Never done    Cervical cancer screen  05/30/2020    Diabetic retinal exam  05/21/2021    A1C test (Diabetic or Prediabetic)  07/20/2021    Diabetic foot exam  01/08/2022    Diabetic microalbuminuria test  04/20/2022    Lipid screen  04/20/2022    Potassium monitoring  04/20/2022    Creatinine monitoring  04/20/2022    Breast cancer screen  09/23/2022    DTaP/Tdap/Td vaccine (3 - Td) 09/13/2027    Colon cancer screen colonoscopy  12/17/2029    Flu vaccine  Completed    Shingles Vaccine  Completed    Pneumococcal 0-64 years Vaccine  Completed    COVID-19 Vaccine  Completed    Hepatitis C screen  Completed    HIV screen  Addressed    Hepatitis A vaccine  Aged Out    Hib vaccine  Aged Out    Meningococcal (ACWY) vaccine  Aged Out     AAA ultrasound (Male, 65-75, smoked ever) indicated at this time? No tobacco history  CT Lung Screen (50-80, 20 pk-yrs, smoking or quit <15 years) indicated at this time? No tobacco history  Sleep Medicine referral indicated at this time (Obesity, Snoring, Daytime Somnolence, Apneic Episodes)? Pt denies symptoms, other than snoring. Future Appointments   Date Time Provider Adela Grace   7/29/2021  1:30 PM Yair Alicia APRN - CNP [facility]   12/27/2021  3:30 PM Corby Hagen MD [facility]         ASSESSMENT       Diagnosis Orders   1. Type 1 diabetes mellitus without complication (HCC)  Hemoglobin A1C    CBC Auto Differential   2. Hyperlipidemia, unspecified hyperlipidemia type  LDL Cholesterol, Direct    CBC Auto Differential   3. Alkaline phosphatase raised  Alkaline Phosphatase    CBC Auto Differential   4. Class 2 severe obesity due to excess calories with serious comorbidity and body mass index (BMI) of 38.0 to 38.9 in adult (HCC)  CBC Auto Differential   5. Allergic rhinitis due to animal hair and dander  CBC Auto Differential   6. Fatty liver     7.  Murmur, cardiac         PLAN      Home BP's- call if > 140/90 on a regular basis. (updated 4/26/2021)  Continue to work on diet, exercise, and weight loss for optimal cardiovascular health  Pap per [facility] Planning this year   After discussion with pt, will hold on referral back to Dr. Tashia Miller for elevated ALK PHOS as values stable for many years. (updated 4/26/2021)  Check HGA1C, D-LDL, ALK PHOS, and CBC in 3 months  Continue current medicines otherwise. No refills needed. Follow up in 3 months with TS in 6-8 months with me.               Preventive Health Topics:  PAP/ PELVIC done per [facility]- last appt 8/25/2020- to follow up annually. (updated 4/26/2021)  MAMMO due after 9/23/2021 (updated 4/26/2021)  COLONOSCOPY done 12/17/2019 per Dr. Tashia Miller- to do again in 12/2029.  (updated 4/26/2021)  Aicha Osuna EXAM- 5/21/2020- Dr. Chai Coronado- all ok- to do annually. (updated 4/26/2021)           Electronically signed by Phu Almazan MD on 4/26/2021 at 2:33 PM

## 2021-04-26 ENCOUNTER — OFFICE VISIT (OUTPATIENT)
Dept: FAMILY MEDICINE CLINIC | Age: 51
End: 2021-04-26
Payer: MEDICAID

## 2021-04-26 VITALS
HEIGHT: 65 IN | RESPIRATION RATE: 16 BRPM | HEART RATE: 72 BPM | BODY MASS INDEX: 39.62 KG/M2 | SYSTOLIC BLOOD PRESSURE: 128 MMHG | DIASTOLIC BLOOD PRESSURE: 76 MMHG | WEIGHT: 237.8 LBS

## 2021-04-26 DIAGNOSIS — E10.9 TYPE 1 DIABETES MELLITUS WITHOUT COMPLICATION (HCC): Primary | ICD-10-CM

## 2021-04-26 DIAGNOSIS — R01.1 MURMUR, CARDIAC: ICD-10-CM

## 2021-04-26 DIAGNOSIS — R74.8 ALKALINE PHOSPHATASE RAISED: ICD-10-CM

## 2021-04-26 DIAGNOSIS — K76.0 FATTY LIVER: ICD-10-CM

## 2021-04-26 DIAGNOSIS — E66.01 CLASS 2 SEVERE OBESITY DUE TO EXCESS CALORIES WITH SERIOUS COMORBIDITY AND BODY MASS INDEX (BMI) OF 38.0 TO 38.9 IN ADULT (HCC): ICD-10-CM

## 2021-04-26 DIAGNOSIS — E78.5 HYPERLIPIDEMIA, UNSPECIFIED HYPERLIPIDEMIA TYPE: ICD-10-CM

## 2021-04-26 DIAGNOSIS — J30.81 ALLERGIC RHINITIS DUE TO ANIMAL HAIR AND DANDER: ICD-10-CM

## 2021-04-26 PROCEDURE — 3052F HG A1C>EQUAL 8.0%<EQUAL 9.0%: CPT | Performed by: FAMILY MEDICINE

## 2021-04-26 PROCEDURE — 99214 OFFICE O/P EST MOD 30 MIN: CPT | Performed by: FAMILY MEDICINE

## 2021-04-26 ASSESSMENT — PATIENT HEALTH QUESTIONNAIRE - PHQ9
SUM OF ALL RESPONSES TO PHQ QUESTIONS 1-9: 0
SUM OF ALL RESPONSES TO PHQ9 QUESTIONS 1 & 2: 0
2. FEELING DOWN, DEPRESSED OR HOPELESS: 0

## 2021-04-26 ASSESSMENT — ENCOUNTER SYMPTOMS
CONSTIPATION: 0
DIARRHEA: 0
SHORTNESS OF BREATH: 0
BLOOD IN STOOL: 0
VOMITING: 0
NAUSEA: 0
CHEST TIGHTNESS: 0
ABDOMINAL PAIN: 0
ANAL BLEEDING: 0

## 2021-04-26 NOTE — PATIENT INSTRUCTIONS
Home BP's- call if > 140/90 on a regular basis. (updated 4/26/2021)  Continue to work on diet, exercise, and weight loss for optimal cardiovascular health  Pap per [facility] Planning this year   After discussion with pt, will hold on referral back to Dr. Yana Shanks for elevated ALK PHOS as values stable for many years. (updated 4/26/2021)  Check HGA1C, D-LDL, ALK PHOS, and CBC in 3 months  Continue current medicines otherwise. No refills needed. Follow up in 3 months with TS in 6-8 months with me.               Preventive Health Topics:  PAP/ PELVIC done per [facility]- last appt 8/25/2020- to follow up annually. (updated 4/26/2021)  MAMMO due after 9/23/2021 (updated 4/26/2021)  COLONOSCOPY done 12/17/2019 per Dr. Yana Shanks- to do again in 12/2029.  (updated 4/26/2021)  Jeremie Dillard EXAM- 5/21/2020- Dr. Ilana Ingram- all ok- to do annually. (updated 4/26/2021)

## 2021-07-25 LAB
ABSOLUTE BASO #: 0.1 X10E9/L (ref 0–0.2)
ABSOLUTE EOS #: 0.5 X10E9/L (ref 0–0.4)
ABSOLUTE LYMPH #: 1.3 X10E9/L (ref 1–3.5)
ABSOLUTE MONO #: 0.4 X10E9/L (ref 0–0.9)
ABSOLUTE NEUT #: 5.5 X10E9/L (ref 1.5–6.6)
ALK PHOSPHATASE: 199 U/L (ref 39–130)
AVERAGE GLUCOSE: 197 MG/DL
BASOPHILS RELATIVE PERCENT: 1 %
EOSINOPHILS RELATIVE PERCENT: 6.2 %
HBA1C MFR BLD: 8.5 % (ref 4.4–6.4)
HCT VFR BLD CALC: 38.1 % (ref 35–47)
HEMOGLOBIN: 13.3 G/DL (ref 11.7–15.5)
LDL CHOLESTEROL DIRECT: 87 MG/DL
LYMPHOCYTE %: 17.1 %
MCH RBC QN AUTO: 29.5 PG (ref 27–34)
MCHC RBC AUTO-ENTMCNC: 34.9 G/DL (ref 32–36)
MCV RBC AUTO: 85 FL (ref 80–100)
MONOCYTES # BLD: 5.1 %
NEUTROPHILS RELATIVE PERCENT: 70.6 %
PDW BLD-RTO: 14.3 % (ref 11.5–15)
PLATELETS: 317 X10E9/L (ref 150–450)
PMV BLD AUTO: 8.4 FL (ref 7–12)
RBC: 4.51 X10E12/L (ref 3.8–5.2)
WBC: 7.8 X10E9/L (ref 4–11)

## 2021-07-29 ENCOUNTER — OFFICE VISIT (OUTPATIENT)
Dept: FAMILY MEDICINE CLINIC | Age: 51
End: 2021-07-29
Payer: COMMERCIAL

## 2021-07-29 VITALS
BODY MASS INDEX: 39.17 KG/M2 | WEIGHT: 235.4 LBS | HEART RATE: 72 BPM | RESPIRATION RATE: 16 BRPM | DIASTOLIC BLOOD PRESSURE: 74 MMHG | SYSTOLIC BLOOD PRESSURE: 120 MMHG

## 2021-07-29 DIAGNOSIS — R74.8 ALKALINE PHOSPHATASE RAISED: ICD-10-CM

## 2021-07-29 DIAGNOSIS — J30.81 ALLERGIC RHINITIS DUE TO ANIMAL HAIR AND DANDER: ICD-10-CM

## 2021-07-29 DIAGNOSIS — E78.5 HYPERLIPIDEMIA, UNSPECIFIED HYPERLIPIDEMIA TYPE: ICD-10-CM

## 2021-07-29 DIAGNOSIS — E10.9 TYPE 1 DIABETES MELLITUS WITHOUT COMPLICATION (HCC): Primary | ICD-10-CM

## 2021-07-29 PROCEDURE — 99214 OFFICE O/P EST MOD 30 MIN: CPT | Performed by: NURSE PRACTITIONER

## 2021-07-29 PROCEDURE — 3052F HG A1C>EQUAL 8.0%<EQUAL 9.0%: CPT | Performed by: NURSE PRACTITIONER

## 2021-07-29 NOTE — PATIENT INSTRUCTIONS
Home BP's- call if > 140/90 on a regular basis.   Continue to work on diet, exercise, and weight loss for optimal cardiovascular health  Pap per [facility] Planning this year   After discussion with pt, will hold on referral back to Dr. Marry Kelly for elevated ALK PHOS as values stable for many years.   Check HGA1C, FLP, TSH, Free T4, and CMP in 5 months  Continue current medicines otherwise.   Follow up in 3 months with TS in 6-8 months with me.              Preventive Health Topics:  PAP/ PELVIC done per [facility]- last appt 8/25/2020- to follow up annually.   MAMMO due after 9/23/2021   COLONOSCOPY done 12/17/2019 per Dr. Marry Kelly- to do again in 12/2029.    Eliot Lora EXAM- 5/21/2020- Dr. Rosalino Gutierrez- all ok- Pt to have completed soon   19}

## 2021-07-29 NOTE — PROGRESS NOTES
Chief Complaint   Patient presents with    3 Month Follow-Up     No concerns, pt doing well. Xavi Vigil is a 45 y. o.female      Patient is here today for 3-month follow-up of insulin-dependent diabetes and hyperlipidemia. Patient Active Problem List   Diagnosis    Hyperlipidemia    Alkaline phosphatase raised    Insulin dependent diabetes mellitus    Allergic rhinitis due to animal hair and dander     Blood sugars are monitored 4-6 times daily. She does not have a CGM at this time. Currently flutuating. Pt is starting to have some hypoglycemic unawareness at night. Takes 5-6 insulin injections daily. Review of Systems   Constitutional: Negative for chills, diaphoresis and fever. Respiratory: Negative for shortness of breath. Cardiovascular: Negative for chest pain, palpitations and leg swelling. Gastrointestinal: Negative for blood in stool, constipation, diarrhea, nausea and vomiting. Genitourinary: Negative for dysuria and hematuria. Musculoskeletal: Negative for myalgias. Neurological: Negative for dizziness and headaches. All other systems reviewed and are negative. OBJECTIVE     /74   Pulse 72   Resp 16   Wt 235 lb 6.4 oz (106.8 kg)   BMI 39.17 kg/m²     Wt Readings from Last 3 Encounters:   07/29/21 235 lb 6.4 oz (106.8 kg)   04/26/21 237 lb 12.8 oz (107.9 kg)   01/08/21 234 lb (106.1 kg)     Not exercising enough currently, working on increasing this. Physical Exam  Vitals and nursing note reviewed. Constitutional:       Appearance: She is well-developed. HENT:      Head: Normocephalic and atraumatic. Right Ear: External ear normal.      Left Ear: External ear normal.      Nose: Nose normal.   Eyes:      Conjunctiva/sclera: Conjunctivae normal.      Pupils: Pupils are equal, round, and reactive to light. Cardiovascular:      Rate and Rhythm: Normal rate and regular rhythm. Heart sounds: Murmur heard.    Systolic murmur is present with a grade of 1/6. Pulmonary:      Effort: Pulmonary effort is normal.      Breath sounds: Normal breath sounds. Abdominal:      General: Bowel sounds are normal.      Palpations: Abdomen is soft. Musculoskeletal:         General: Normal range of motion. Cervical back: Normal range of motion and neck supple. Skin:     General: Skin is warm and dry. Neurological:      Mental Status: She is alert and oriented to person, place, and time. Deep Tendon Reflexes: Reflexes are normal and symmetric. Psychiatric:         Behavior: Behavior normal.         Thought Content:  Thought content normal.         Judgment: Judgment normal.             Component      Latest Ref Rng & Units 7/24/2021   WBC      4.0 - 11.0 X10E9/L 7.8   RBC      3.80 - 5.20 X10E12/L 4.51   Hemoglobin Quant      11.7 - 15.5 g/dL 13.3   Hematocrit      35 - 47 % 38.1   MCV      80 - 100 fL 85   MCH      27 - 34 pg 29.5   MCHC      32 - 36 g/dL 34.9   RDW      11.5 - 15.0 % 14.3   Platelet Count      466 - 450 X10E9/L 317   MPV      7 - 12 fL 8.4   Neutrophils %      % 70.6   Absolute Neut #      1.5 - 6.6 X10E9/L 5.5   Lymphocyte %      % 17.1   Absolute Lymph #      1.0 - 3.5 X10E9/L 1.3   Monocytes      % 5.1   Absolute Mono #      0 - 0.9 X10E9/L 0.4   Eosinophils %      % 6.2   Absolute Eos #      0.0 - 0.4 X10E9/L 0.5 (H)   Basophils %      % 1.0   Basophils Absolute      0.0 - 0.2 X10E9/L 0.1   Hemoglobin A1C      4.4 - 6.4 % 8.5 (H)   AVERAGE GLUCOSE      mg/dL 197   LDL Direct      <130 mg/dL 87   Alk Phosphatase      39 - 130 U/L 199 (H)     Component      Latest Ref Rng & Units 7/24/2021 4/20/2021 1/2/2021 9/21/2020   Hemoglobin A1C      4.4 - 6.4 % 8.5 (H) 8.2 (H) 7.9 (H) 8.4 (H)   AVERAGE GLUCOSE      mg/dL 197 189 180 194     Component      Latest Ref Rng & Units 7/24/2021 4/20/2021 1/2/2021 9/21/2020   Glucose      65 - 99 mg/dL  297 (H)  198 (H)   BUN      5 - 23 mg/dL  12  13   Creatinine      0.40 - 1.00 mg/dL  0.75  0.72   eGFR African American      >59 ml/min/1.73sq.m  >60  >60   EGFR IF NonAfrican American      >59 ml/min/1.73sq.m  >60  >60   Calcium      8.5 - 10.5 mg/dL  8.8  9.0   Sodium      134 - 146 mmol/L  134  137   Potassium      3.5 - 5.0 mmol/L  4.3  3.9   Chloride      98 - 109 mmol/L  98  99   CO2      22 - 32 mmol/L  27  28   Anion Gap      5 - 15 mmol/L  9  10   Bilirubin      0.3 - 1.2 mg/dL  0.5  0.6   Alk Phosphatase      39 - 130 U/L 199 (H) 173 (H) 167 (H) 169 (H)   AST      0 - 41 U/L  17  17   ALT      0 - 31 U/L  14  17   Total Protein      6.0 - 8.0 g/dL  6.8  7.1   Albumin      3.2 - 5.3 g/dL  3.8  3.9   LDL Direct      <130 mg/dL 87        Wt Readings from Last 3 Encounters:   07/29/21 235 lb 6.4 oz (106.8 kg)   04/26/21 237 lb 12.8 oz (107.9 kg)   01/08/21 234 lb (106.1 kg)       Immunization History   Administered Date(s) Administered    COVID-19, Pfizer, PF, 30mcg/0.3mL 03/23/2021, 04/13/2021    Influenza 12/01/2012, 11/29/2013    Influenza A ([id number]) Vaccine PF IM 10/26/2009    Influenza Vaccine, unspecified formulation 11/09/2015, 10/03/2016, 10/23/2017    Influenza Virus Vaccine 12/01/2012, 11/29/2013, 12/10/2014, 11/09/2015, 10/23/2017, 10/24/2018, 10/25/2019, 10/14/2020    Influenza Whole 10/24/2008    Influenza, Quadv, IM, (6 mo and older Fluzone, Flulaval, Fluarix and 3 yrs and older Afluria) 10/03/2016    Influenza, Quadv, IM, PF (6 mo and older Fluzone, Flulaval, Fluarix, and 3 yrs and older Afluria) 10/23/2018, 10/14/2020    Pneumococcal Polysaccharide ([id number]) 08/03/2011    Td, unspecified formulation 01/01/2008    Tdap (Boostrix, Adacel) 06/19/2009, 09/13/2017    Zoster Recombinant (Shingrix) 10/14/2020, 12/09/2020         Health Maintenance   Topic Date Due    Hepatitis B vaccine (1 of 3 - Risk 3-dose series) Never done    Cervical cancer screen  05/30/2020    Diabetic retinal exam  05/21/2021    Flu vaccine (1) 09/01/2021    A1C test (Diabetic or Prediabetic)  10/24/2021    Diabetic foot exam  01/08/2022    Diabetic microalbuminuria test  04/20/2022    Potassium monitoring  04/20/2022    Creatinine monitoring  04/20/2022    Lipid screen  07/24/2022    Breast cancer screen  09/23/2022    DTaP/Tdap/Td vaccine (3 - Td or Tdap) 09/13/2027    Colon cancer screen colonoscopy  12/17/2029    Pneumococcal 0-64 years Vaccine (2 of 2 - PPSV23) 09/30/2034    Shingles Vaccine  Completed    COVID-19 Vaccine  Completed    Hepatitis C screen  Completed    HIV screen  Addressed    Hepatitis A vaccine  Aged Out    Hib vaccine  Aged Out    Meningococcal (ACWY) vaccine  Aged Out       Future Appointments   Date Time Provider Adela Grace   12/27/2021  3:30 PM Roxana Farmer MD [facility]         ASSESSMENT       Diagnosis Orders   1. Type 1 diabetes mellitus without complication (HCC)  Hemoglobin A1C    Lipid Panel    Comprehensive Metabolic Panel    T4, Free    TSH without Reflex   2. Hyperlipidemia, unspecified hyperlipidemia type  Lipid Panel   3. Alkaline phosphatase raised  Comprehensive Metabolic Panel   4.  Allergic rhinitis due to animal hair and dander         PLAN        Orders Placed This Encounter   Procedures    Hemoglobin A1C     Standing Status:   Future     Standing Expiration Date:   7/29/2022    Lipid Panel     Standing Status:   Future     Standing Expiration Date:   7/29/2022     Order Specific Question:   Is Patient Fasting?/# of Hours     Answer:   yes/12    Comprehensive Metabolic Panel     Standing Status:   Future     Standing Expiration Date:   7/29/2022    T4, Free     Standing Status:   Future     Standing Expiration Date:   7/29/2022    TSH without Reflex     Standing Status:   Future     Standing Expiration Date:   7/29/2022       Home BP's- call if > 140/90 on a regular basis.   Continue to work on diet, exercise, and weight loss for optimal cardiovascular health  Will continue to hold on referral back to Dr. Gwen Car for elevated ALK PHOS as values stable for many years.   Pt to speak with insurance on coverage for CGM  Pt to monitor BS closely  Check HGA1C, FLP, TSH, Free T4, and CMP in 5 months  Continue current medicines otherwise. Follow up as scheduled            Preventive Health Topics:  PAP/ PELVIC done per [facility]- last appt 8/25/2020- to follow up annually.   MAMMO due after 9/23/2021   COLONOSCOPY done 12/17/2019 per Dr. Gwen Car- to do again in 12/2029.    [facility]oh[facility] Gan EXAM- 5/21/2020- Dr. Amy Lund- all ok- Pt to have completed soon as appts were backed up  19}     Return in about 5 months (around 12/29/2021).         Electronically signed by CHELO Chapman CNP on 7/30/2021 at 1:43 PM

## 2021-07-30 ASSESSMENT — ENCOUNTER SYMPTOMS
DIARRHEA: 0
CONSTIPATION: 0
NAUSEA: 0
BLOOD IN STOOL: 0
VOMITING: 0
SHORTNESS OF BREATH: 0

## 2021-10-04 ENCOUNTER — PATIENT MESSAGE (OUTPATIENT)
Dept: FAMILY MEDICINE CLINIC | Age: 51
End: 2021-10-04

## 2021-10-04 DIAGNOSIS — E10.9 TYPE 1 DIABETES MELLITUS WITHOUT COMPLICATION (HCC): ICD-10-CM

## 2021-10-05 RX ORDER — INSULIN GLARGINE 100 [IU]/ML
INJECTION, SOLUTION SUBCUTANEOUS
Qty: 4 EACH | Refills: 3 | Status: SHIPPED | OUTPATIENT
Start: 2021-10-05 | End: 2022-09-13

## 2021-10-05 RX ORDER — GLUCOSAMINE HCL/CHONDROITIN SU 500-400 MG
CAPSULE ORAL
Qty: 500 STRIP | Refills: 3 | Status: SHIPPED | OUTPATIENT
Start: 2021-10-05

## 2021-10-05 NOTE — TELEPHONE ENCOUNTER
From: Major Owens  To: Silverio Gottlieb MD  Sent: 10/4/2021 6:22 PM EDT  Subject: Prescription Question    I need refills sent to [facility] for the following  Lantus Insulin 31 units daily (3 month fill please) 15 u in day, 16 u night  BD Insulin syringes 1/2 c.f. 30g if possible use 2 a day, 3 month script please  Accu-check test strips use 5 daily 3 month script please

## 2021-10-12 ENCOUNTER — HOSPITAL ENCOUNTER (OUTPATIENT)
Dept: WOMENS IMAGING | Age: 51
Discharge: HOME | End: 2021-10-12
Payer: COMMERCIAL

## 2021-10-12 DIAGNOSIS — Z12.31 VISIT FOR SCREENING MAMMOGRAM: ICD-10-CM

## 2021-10-12 PROCEDURE — 77063 BREAST TOMOSYNTHESIS BI: CPT

## 2021-12-12 LAB
ALBUMIN SERPL-MCNC: 3.7 G/DL (ref 3.2–5.3)
ALK PHOSPHATASE: 160 U/L (ref 39–130)
ALT SERPL-CCNC: 15 U/L (ref 0–31)
ANION GAP SERPL CALCULATED.3IONS-SCNC: 8 MMOL/L (ref 5–15)
AST SERPL-CCNC: 16 U/L (ref 0–41)
AVERAGE GLUCOSE: 183 MG/DL
BILIRUB SERPL-MCNC: 0.6 MG/DL (ref 0.3–1.2)
BUN BLDV-MCNC: 14 MG/DL (ref 5–23)
CALCIUM SERPL-MCNC: 9 MG/DL (ref 8.5–10.5)
CHLORIDE BLD-SCNC: 99 MMOL/L (ref 98–109)
CHOLESTEROL/HDL RATIO: 2.9 (ref 1–5)
CHOLESTEROL: 152 MG/DL (ref 150–200)
CO2: 28 MMOL/L (ref 22–32)
CREAT SERPL-MCNC: 0.63 MG/DL (ref 0.4–1)
EGFR AFRICAN AMERICAN: >60 ML/MIN/1.73SQ.M
EGFR IF NONAFRICAN AMERICAN: >60 ML/MIN/1.73SQ.M
GLUCOSE: 267 MG/DL (ref 65–99)
HBA1C MFR BLD: 8 % (ref 4.4–6.4)
HDLC SERPL-MCNC: 52 MG/DL
LDL CHOLESTEROL CALCULATED: 80 MG/DL
LDL/HDL RATIO: 1.5
POTASSIUM SERPL-SCNC: 4.1 MMOL/L (ref 3.5–5)
SODIUM BLD-SCNC: 135 MMOL/L (ref 134–146)
T4 FREE: 0.83 NG/DL (ref 0.61–1.6)
TOTAL PROTEIN: 6.9 G/DL (ref 6–8)
TRIGL SERPL-MCNC: 101 MG/DL (ref 27–150)
TSH SERPL DL<=0.05 MIU/L-ACNC: 1.83 UIU/ML (ref 0.49–4.67)
VLDLC SERPL CALC-MCNC: 20 MG/DL (ref 0–30)

## 2021-12-22 NOTE — PROGRESS NOTES
[facility]  [address] [place] [address] Rd., [phone] [id number]  Dept: [phone]  Dept Fax: [phone]    Brandyn Perez, was evaluated through a synchronous (real-time) audio encounter. The patient (or guardian if applicable) is aware that this is a billable service. Verbal consent to proceed has been obtained within the past 12 months. The visit was conducted pursuant to the emergency declaration under the [address] [place] [address], [address] [place] [address] authority and the [employer] Act. Patient identification was verified, and a caregiver was present when appropriate. The patient was located in a state where the provider was credentialed to provide care. Total time spent for this encounter: 27    --Luana Tomas MD on 12/27/2021 at 5:57 PM    An electronic signature was used to authenticate this note. Klaudia Lopez is a 45 y. o.female    Pt presents for follow up of IDDM, Hyperlipidemia, elevated ALK PHOS, and Allergies    Pt feeling ok since last visit- interval history and any new issues noted below:     Pt complains of COVID exposure last week- pt then developed fever (Tmax- 100.7), cough, and chills over past 2 days- + Productive. No sinus/ tooth pain. Pt would like testing for COVID 19 at this time. Glucometer readings at home are running \"not too bad\", 110-140 often, slightly higher due to recent illness. Checking 4-6x/day. The home BP readings have been in the 110-120's / 70's range. Checking occasionally.        Patient Active Problem List   Diagnosis    Hyperlipidemia    Alkaline phosphatase raised    Insulin dependent diabetes mellitus    Allergic rhinitis due to animal hair and dander       Current Outpatient Medications   Medication Sig Dispense Refill    atorvastatin (LIPITOR) 40 MG tablet Take 1 tablet by mouth daily 90 tablet 3    blood glucose monitor kit and supplies Accu Chek blood glucose monitor, use daily as instructed, DX: E10.9 1 kit 0    insulin glargine (LANTUS) 100 UNIT/ML injection vial Inject SQ 15 units each am and 16 units nightly 4 each 3    Insulin Syringe-Needle U-100 30G X 1/2\" 0.5 ML MISC BD Insulin syringes. Use with Lantus insulin bid  Dx: E10.9 200 each 3    blood glucose monitor strips Accuchek testing strips. Test 5 times daily and prn. Dx: E10.9 500 strip 3    Probiotic Product (PROBIOTIC PO) Take by mouth      aspirin 81 MG EC tablet Take 81 mg by mouth daily      lisinopril (PRINIVIL;ZESTRIL) 5 MG tablet Take 1 tablet by mouth daily 90 tablet 3    insulin lispro (HUMALOG KWIKPEN) 100 UNIT/ML pen Inject 7 units with breakfast, 10 units with lunch and dinner plus STR group 1 scale with a maximum of 50 units per day total 15 pen 3    loratadine (CLARITIN) 10 MG tablet Take 10 mg by mouth nightly       Insulin Pen Needle (PEN NEEDLES 31GX5/16\") 31G X 8 MM MISC Use with Humalog KwikPen Dx: E11.9 100 each 5    coenzyme Q10 100 MG CAPS capsule Take 200 mg by mouth daily      Ascorbic Acid (VITAMIN C) 500 MG tablet Take 500 mg by mouth daily.  APPLE CIDER VINEGAR PO Drink with water at every meal       No current facility-administered medications for this visit. Review of Systems   Constitutional: Positive for chills and fever. Negative for diaphoresis, fatigue and unexpected weight change. Eyes: Negative for visual disturbance. Respiratory: Negative for chest tightness and shortness of breath. Cardiovascular: Negative for chest pain, palpitations and leg swelling. Gastrointestinal: Negative for abdominal pain, anal bleeding, blood in stool, constipation, diarrhea (slightly loose), nausea and vomiting. Genitourinary: Negative for dysuria and hematuria. Musculoskeletal: Negative for neck pain. Neurological: Negative for dizziness, light-headedness and headaches. OBJECTIVE     There were no vitals taken for this visit.   There is no height or weight on file to calculate BMI. BP Readings from Last 3 Encounters:   07/29/21 120/74   04/26/21 128/76   01/08/21 108/64      Due to this being a TeleHealth encounter, evaluation of the following organ systems is limited: Vitals/Constitutional/EENT/Resp/CV/GI//MS/Neuro/Skin/Heme-Lymph-Imm. PHYSICAL EXAMINATION:  [ INSTRUCTIONS:  \"[x]\" Indicates a positive item  \"[]\" Indicates a negative item  -- DELETE ALL ITEMS NOT EXAMINED]  Vital Signs: (All Vital Signs are pt reported, unless otherwise noted)   There were no vitals taken for this visit. Constitutional: [] Appears well-developed and well-nourished [x] No apparent distress      [] Abnormal-   Mental status  [x] Alert and awake  [x] Oriented to person/place/time [x]Able to follow commands      Eyes:  EOM    []  Normal  [] Abnormal-  Sclera  []  Normal  [] Abnormal -         Discharge []  None visible  [] Abnormal -    HENT:   [] Normocephalic, atraumatic. [] Abnormal   [] Mouth/Throat: Mucous membranes are moist.     External Ears [] Normal  [] Abnormal-     Neck: [] No visualized mass     Pulmonary/Chest: [] Respiratory effort normal.  [] No visualized signs of difficulty breathing or respiratory distress        [] Abnormal-      Musculoskeletal:   [] Normal gait with no signs of ataxia         [] Normal range of motion of neck        [] Abnormal-       Neurological:        [] No Facial Asymmetry (Cranial nerve 7 motor function) (limited exam to video visit)          [] No gaze palsy        [] Abnormal-         Skin:        [] No significant exanthematous lesions or discoloration noted on facial skin         [] Abnormal-            Psychiatric:       [x] Normal Affect [x] No Hallucinations        [] Abnormal-     Other pertinent observable physical exam findings-       Component      Latest Ref Rng & Units 12/11/2021   Glucose      65 - 99 mg/dL 267 (H)  Pt gave less insulin the night prior to avoid hypoglycemia.     BUN      5 - 23 mg/dL 14 Creatinine      0.40 - 1.00 mg/dL 0.63   eGFR African American      >59 ml/min/1.73sq.m >60   EGFR IF NonAfrican American      >59 ml/min/1.73sq.m >60   Calcium      8.5 - 10.5 mg/dL 9.0   Sodium      134 - 146 mmol/L 135   Potassium      3.5 - 5.0 mmol/L 4.1   Chloride      98 - 109 mmol/L 99   CO2      22 - 32 mmol/L 28   Anion Gap      5 - 15 mmol/L 8   Bilirubin      0.3 - 1.2 mg/dL 0.6   Alk Phosphatase      39 - 130 U/L 160 (H)   AST      0 - 41 U/L 16   ALT      0 - 31 U/L 15   Total Protein      6.0 - 8.0 g/dL 6.9   Albumin      3.2 - 5.3 g/dL 3.7   Cholesterol      150 - 200 mg/dL 152   Triglycerides      27 - 150 mg/dL 101   HDL Cholesterol      >39 mg/dL 52   LDL Calculated      <130 mg/dL 80   VLDL Cholesterol Calculated      0 - 30 mg/dL 20   LDl/HDL Ratio      <3.5 1.5   Chol/HDL Ratio      1.0 - 5.0 2.9   Hemoglobin A1C      4.4 - 6.4 % 8.0 (H)  Improved   AVERAGE GLUCOSE      mg/dL 183   T4 Free      0.61 - 1.60 ng/dL 0.83   TSH      0.49 - 4.67 uIU/mL 1.83          Lab Results   Component Value Date    LABA1C 8.0 (H) 12/11/2021     Lab Results   Component Value Date    CHOL 152 12/11/2021    TRIG 101 12/11/2021    HDL 52 12/11/2021    LDLCALC 80 12/11/2021    LDLDIRECT 87 07/24/2021     The 10-year ASCVD risk score (Mckinley Perkins et al., 2013) is: 1.9%    Values used to calculate the score:      Age: 45 years      Sex: Female      Is Non- : No      Diabetic: Yes      Tobacco smoker: No      Systolic Blood Pressure: 490 mmHg      Is BP treated: No      HDL Cholesterol: 52 mg/dL      Total Cholesterol: 152 mg/dL    Lab Results   Component Value Date     12/11/2021    K 4.1 12/11/2021    CL 99 12/11/2021    CO2 28 12/11/2021    BUN 14 12/11/2021    CREATININE 0.63 12/11/2021    GLUCOSE 267 (H) 12/11/2021    CALCIUM 9.0 12/11/2021    PROT 6.9 12/11/2021    LABALBU 3.7 12/11/2021    BILITOT 0.6 12/11/2021    ALKPHOS 160 (H) 12/11/2021    AST 16 12/11/2021    ALT 15 12/11/2021     Component      Latest Ref Rng & Units 4/20/2021   Creatine, Urine      mg/dL 42.90   Microalb, Ur      0.0 - 1.9 mg/dL <0.7   Microalbumin Creatinine Ratio      0.0 - 30.0 mg/g creat SEE NOTE         Lab Results   Component Value Date    TSH 1.83 12/11/2021    T4FREE 0.83 12/11/2021       Lab Results   Component Value Date    WBC 7.8 07/24/2021    HGB 13.3 07/24/2021    HCT 38.1 07/24/2021    MCV 85 07/24/2021     07/24/2021       Immunization History   Administered Date(s) Administered    COVID-19, Pfizer, PF, 30mcg/0.3mL 03/23/2021, 04/13/2021    Influenza 12/01/2012, 11/29/2013    Influenza A ([id number]) Vaccine PF IM 10/26/2009    Influenza Vaccine, unspecified formulation 11/09/2015, 10/03/2016, 10/23/2017    Influenza Virus Vaccine 12/01/2012, 11/29/2013, 12/10/2014, 11/09/2015, 10/23/2017, 10/24/2018, 10/25/2019, 10/14/2020, 10/24/2021    Influenza Whole 10/24/2008    Influenza, Quadv, IM, (6 mo and older Fluzone, Flulaval, Fluarix and 3 yrs and older Afluria) 10/03/2016    Influenza, Quadv, IM, PF (6 mo and older Fluzone, Flulaval, Fluarix, and 3 yrs and older Afluria) 10/23/2018, 10/14/2020    Pneumococcal Polysaccharide ([id number]) 08/03/2011    Td, unspecified formulation 01/01/2008    Tdap (Boostrix, Adacel) 06/19/2009, 09/13/2017    Zoster Recombinant (Shingrix) 10/14/2020, 12/09/2020       Health Maintenance   Topic Date Due    Hepatitis B vaccine (1 of 3 - Risk 3-dose series) Never done    Cervical cancer screen  05/30/2020    COVID-19 Vaccine (3 - Booster for Pfizer series) 10/13/2021    Diabetic foot exam  01/08/2022    A1C test (Diabetic or Prediabetic)  03/11/2022    Diabetic microalbuminuria test  04/20/2022    Diabetic retinal exam  08/25/2022    Lipid screen  12/11/2022    Potassium monitoring  12/11/2022    Creatinine monitoring  12/11/2022    Breast cancer screen  10/12/2023    DTaP/Tdap/Td vaccine (3 - Td or Tdap) 09/13/2027    Colon cancer screen colonoscopy  12/17/2029    Pneumococcal 0-64 years Vaccine (2 of 2 - PPSV23) 09/30/2034    Flu vaccine  Completed    Shingles Vaccine  Completed    Hepatitis C screen  Completed    HIV screen  Addressed    Hepatitis A vaccine  Aged Out    Hib vaccine  Aged Out    Meningococcal (ACWY) vaccine  Aged Out     CT Lung Screen (50-80, 20 pk-yrs, smoking or quit <15 years) indicated at this time? No tobacco history  Sleep Medicine referral indicated at this time (Obesity, Snoring, Daytime Somnolence, Apneic Episodes)? Patient denies any current symptoms, other than snoring. Future Appointments   Date Time Provider Adela Grace   3/28/2022  1:30 PM CHELO Lara - CNP [facility] [email or web address]       ASSESSMENT       Diagnosis Orders   1. Type 1 diabetes mellitus without complication (HCC)  Hemoglobin A1C    Hemoglobin A1C    Microalbumin / Creatinine Urine Ratio    CBC Auto Differential   2. Hyperlipidemia, unspecified hyperlipidemia type  LDL Cholesterol, Direct    CBC Auto Differential   3. Alkaline phosphatase raised  Alkaline Phosphatase    CBC Auto Differential   4. Fatty liver  CBC Auto Differential   5. Class 2 severe obesity due to excess calories with serious comorbidity and body mass index (BMI) of 38.0 to 38.9 in adult (HCC)  CBC Auto Differential   6. Allergic rhinitis due to animal hair and dander  CBC Auto Differential   7. Murmur, cardiac  CBC Auto Differential   8. Cough  POCT COVID-19, Rapid   9. Fever, unspecified fever cause  POCT COVID-19, Rapid       PLAN      Check POCT COVID at this time. Consider Monoclonal Antibody treatment if COVID positive- pt would like to hold if positive, as she is feeling well at this time.    Home BP's- call if > 140/90 on a regular basis. (updated 12/27/2021)  Continue to work on diet, exercise, and weight loss for optimal cardiovascular health (updated 12/27/2021)  Pap per [facility] this year   After discussion with pt, will hold on referral back to Dr. Patrice Abreu for elevated ALK PHOS as values stable for many years. (updated 12/27/2021)  Check HGA1C in 3 months  Check HG A1c, DLDL, alk phos, spot MA, and CBC in 6 months  Continue current medicines otherwise. No refills needed. Follow up in 6 months (will update on HG A1c when completed in 3 months)                     Preventive Health Topics:  Encouraged COVID-19 vaccination booster at this time  PAP/ PELVIC done per [facility]- last appt 2021- to follow up annually- please call to check on date of next appt.   (updated 12/27/2021)  MAMMO due after 10/12/2022  COLONOSCOPY done 12/17/2019 per Dr. Patrice Abreu- to do again in 12/2029.  (updated 12/27/2021)   [facility] EXAM- 8/25/2021- Dr. Melissa Brizuela- all ok- to do annually. (updated 12/27/2021)           Electronically signed by Marshall Snyder MD on 12/27/2021 at 5:57 PM

## 2021-12-27 ENCOUNTER — VIRTUAL VISIT (OUTPATIENT)
Dept: FAMILY MEDICINE CLINIC | Age: 51
End: 2021-12-27
Payer: COMMERCIAL

## 2021-12-27 DIAGNOSIS — E78.5 HYPERLIPIDEMIA, UNSPECIFIED HYPERLIPIDEMIA TYPE: ICD-10-CM

## 2021-12-27 DIAGNOSIS — E66.01 CLASS 2 SEVERE OBESITY DUE TO EXCESS CALORIES WITH SERIOUS COMORBIDITY AND BODY MASS INDEX (BMI) OF 38.0 TO 38.9 IN ADULT (HCC): ICD-10-CM

## 2021-12-27 DIAGNOSIS — E10.9 TYPE 1 DIABETES MELLITUS WITHOUT COMPLICATION (HCC): Primary | ICD-10-CM

## 2021-12-27 DIAGNOSIS — R74.8 ALKALINE PHOSPHATASE RAISED: ICD-10-CM

## 2021-12-27 DIAGNOSIS — R01.1 MURMUR, CARDIAC: ICD-10-CM

## 2021-12-27 DIAGNOSIS — R50.9 FEVER, UNSPECIFIED FEVER CAUSE: ICD-10-CM

## 2021-12-27 DIAGNOSIS — R05.9 COUGH: ICD-10-CM

## 2021-12-27 DIAGNOSIS — J30.81 ALLERGIC RHINITIS DUE TO ANIMAL HAIR AND DANDER: ICD-10-CM

## 2021-12-27 DIAGNOSIS — K76.0 FATTY LIVER: ICD-10-CM

## 2021-12-27 LAB
Lab: NORMAL
QC PASS/FAIL: NORMAL
SARS-COV-2 RDRP RESP QL NAA+PROBE: NEGATIVE

## 2021-12-27 PROCEDURE — 99443 PR PHYS/QHP TELEPHONE EVALUATION 21-30 MIN: CPT | Performed by: FAMILY MEDICINE

## 2021-12-27 PROCEDURE — 87635 SARS-COV-2 COVID-19 AMP PRB: CPT | Performed by: FAMILY MEDICINE

## 2021-12-27 ASSESSMENT — ENCOUNTER SYMPTOMS
SHORTNESS OF BREATH: 0
BLOOD IN STOOL: 0
CHEST TIGHTNESS: 0
NAUSEA: 0
VOMITING: 0
ABDOMINAL PAIN: 0
CONSTIPATION: 0
ANAL BLEEDING: 0
DIARRHEA: 0

## 2021-12-27 NOTE — PATIENT INSTRUCTIONS
Check POCT COVID at this time. Consider Monoclonal Antibody treatment if COVID positive- pt would like to hold if positive, as she is feeling well at this time. Home BP's- call if > 140/90 on a regular basis. (updated 12/27/2021)  Continue to work on diet, exercise, and weight loss for optimal cardiovascular health (updated 12/27/2021)  Pap per [facility] this year   After discussion with pt, will hold on referral back to Dr. Servando Levy for elevated ALK PHOS as values stable for many years. (updated 12/27/2021)  Check HGA1C in 3 months  Check HG A1c, DLDL, alk phos, spot MA, and CBC in 6 months  Continue current medicines otherwise. No refills needed. Follow up in 6 months (will update on HG A1c when completed in 3 months)                    Preventive Health Topics:  Encouraged COVID-19 vaccination booster at this time  PAP/ PELVIC done per [facility]- last appt 2021- to follow up annually- please call to check on date of next appt.   (updated 12/27/2021)  MAMMO due after 10/12/2022  COLONOSCOPY done 12/17/2019 per Dr. Servando Levy- to do again in 12/2029.  (updated 12/27/2021)   [facility] EXAM- 8/25/2021- Dr. Michelle Queen- all ok- to do annually. (updated 12/27/2021)

## 2022-01-21 ENCOUNTER — PATIENT MESSAGE (OUTPATIENT)
Dept: FAMILY MEDICINE CLINIC | Age: 52
End: 2022-01-21

## 2022-01-21 NOTE — TELEPHONE ENCOUNTER
From: Maggi Perez  To: Gabriella Sunshine  Sent: 1/21/2022 9:51 AM EST  Subject: Pap smear     Hi [employer] I needed to update the info regarding yearly exams  I had1 August of 2020 all was fine. Missed getting 1 last year. I have 1 scheduled for February 15. I will let you know results when I get them.

## 2022-02-18 ENCOUNTER — PATIENT MESSAGE (OUTPATIENT)
Dept: FAMILY MEDICINE CLINIC | Age: 52
End: 2022-02-18

## 2022-02-18 NOTE — TELEPHONE ENCOUNTER
From: Jerardo Jaramillo  To: Erlin Adjutant  Sent: 2/18/2022 1:31 PM EST  Subject: Pap smear & Booster Covid shot    I had both done on 2-15 at [facility].  Results for pap showed all good, no issues

## 2022-02-27 LAB
AVERAGE GLUCOSE: 200 MG/DL
HBA1C MFR BLD: 8.6 % (ref 4.4–6.4)

## 2022-03-06 ENCOUNTER — PATIENT MESSAGE (OUTPATIENT)
Dept: FAMILY MEDICINE CLINIC | Age: 52
End: 2022-03-06

## 2022-03-06 DIAGNOSIS — E10.9 TYPE 1 DIABETES MELLITUS WITHOUT COMPLICATION (HCC): ICD-10-CM

## 2022-03-07 ENCOUNTER — PATIENT MESSAGE (OUTPATIENT)
Dept: FAMILY MEDICINE CLINIC | Age: 52
End: 2022-03-07

## 2022-03-07 RX ORDER — INSULIN LISPRO 100 [IU]/ML
INJECTION, SOLUTION INTRAVENOUS; SUBCUTANEOUS
Qty: 12 PEN | Refills: 3 | Status: SHIPPED | OUTPATIENT
Start: 2022-03-07

## 2022-03-07 RX ORDER — LISINOPRIL 5 MG/1
5 TABLET ORAL DAILY
Qty: 90 TABLET | Refills: 3 | Status: SHIPPED | OUTPATIENT
Start: 2022-03-07

## 2022-03-07 NOTE — TELEPHONE ENCOUNTER
From: Vonnie Oneill  To: Dr. Hanna Warren: 3/6/2022 10:32 AM EST  Subject: Refill request    I need a new script for the Lysinopril 5mg sent to [address] [place] [address]. please 3 month supply

## 2022-03-28 ENCOUNTER — OFFICE VISIT (OUTPATIENT)
Dept: FAMILY MEDICINE CLINIC | Age: 52
End: 2022-03-28
Payer: COMMERCIAL

## 2022-03-28 VITALS
BODY MASS INDEX: 39.37 KG/M2 | HEART RATE: 72 BPM | RESPIRATION RATE: 16 BRPM | SYSTOLIC BLOOD PRESSURE: 134 MMHG | DIASTOLIC BLOOD PRESSURE: 74 MMHG | WEIGHT: 236.6 LBS

## 2022-03-28 DIAGNOSIS — E78.5 HYPERLIPIDEMIA, UNSPECIFIED HYPERLIPIDEMIA TYPE: ICD-10-CM

## 2022-03-28 DIAGNOSIS — E10.9 TYPE 1 DIABETES MELLITUS WITHOUT COMPLICATION (HCC): Primary | ICD-10-CM

## 2022-03-28 DIAGNOSIS — E10.649 HYPOGLYCEMIA UNAWARENESS ASSOCIATED WITH TYPE 1 DIABETES MELLITUS (HCC): ICD-10-CM

## 2022-03-28 PROCEDURE — 3052F HG A1C>EQUAL 8.0%<EQUAL 9.0%: CPT | Performed by: NURSE PRACTITIONER

## 2022-03-28 PROCEDURE — 99214 OFFICE O/P EST MOD 30 MIN: CPT | Performed by: NURSE PRACTITIONER

## 2022-03-28 RX ORDER — M-VIT,TX,IRON,MINS/CALC/FOLIC 27MG-0.4MG
1 TABLET ORAL DAILY
COMMUNITY

## 2022-03-28 SDOH — ECONOMIC STABILITY: FOOD INSECURITY: WITHIN THE PAST 12 MONTHS, THE FOOD YOU BOUGHT JUST DIDN'T LAST AND YOU DIDN'T HAVE MONEY TO GET MORE.: NEVER TRUE

## 2022-03-28 SDOH — ECONOMIC STABILITY: FOOD INSECURITY: WITHIN THE PAST 12 MONTHS, YOU WORRIED THAT YOUR FOOD WOULD RUN OUT BEFORE YOU GOT MONEY TO BUY MORE.: NEVER TRUE

## 2022-03-28 ASSESSMENT — SOCIAL DETERMINANTS OF HEALTH (SDOH): HOW HARD IS IT FOR YOU TO PAY FOR THE VERY BASICS LIKE FOOD, HOUSING, MEDICAL CARE, AND HEATING?: NOT HARD AT ALL

## 2022-03-28 NOTE — PROGRESS NOTES
Chief Complaint   Patient presents with    3 Month Follow-Up    Discuss Labs       SUBJECTIVE     Figueroa Soriano is a 45 y. o.female      Here for follow up of chronic health problems including:    Patient Active Problem List   Diagnosis    Hyperlipidemia    Alkaline phosphatase raised    Insulin dependent diabetes mellitus    Allergic rhinitis due to animal hair and dander    Type 1 diabetes mellitus without complication (HCC)     N6P last month was 8.6. States she has not been doing anything different but plans to add in exercise to see if that helps. She is trying to watch diet closer. Still eating 1 piece of toast with PB for breakfast. For lunch she is packing 1/2 sandwich, fruit, and vegetable. For supper she is trying to decrease her carb intake. Thinks it is at nighttime that has her BS bouncing around. She is starting to get some hypoglycemia unawareness. Will get a little foggy but not the stronger symptoms like she use to. She is monitoring her BS 5-6 times daily and giving insulin 5-6 times daily. Pt had her booster in the left arm and had some axilla lymph node swelling for 3-4 days after. Review of Systems   Constitutional: Negative for chills, diaphoresis and fever. Respiratory: Negative for shortness of breath. Cardiovascular: Negative for chest pain, palpitations and leg swelling. Gastrointestinal: Negative for blood in stool, constipation, diarrhea, nausea and vomiting. Genitourinary: Negative for dysuria and hematuria. Musculoskeletal: Negative for myalgias. Neurological: Negative for dizziness and headaches. All other systems reviewed and are negative. OBJECTIVE     /74   Pulse 72   Resp 16   Wt 236 lb 9.6 oz (107.3 kg)   BMI 39.37 kg/m²     Wt Readings from Last 3 Encounters:   03/28/22 236 lb 9.6 oz (107.3 kg)   07/29/21 235 lb 6.4 oz (106.8 kg)   04/26/21 237 lb 12.8 oz (107.9 kg)       Physical Exam  Vitals and nursing note reviewed. Constitutional:       Appearance: She is well-developed. HENT:      Head: Normocephalic and atraumatic. Right Ear: External ear normal.      Left Ear: External ear normal.      Nose: Nose normal.   Eyes:      Conjunctiva/sclera: Conjunctivae normal.      Pupils: Pupils are equal, round, and reactive to light. Cardiovascular:      Rate and Rhythm: Normal rate and regular rhythm. Heart sounds: Normal heart sounds. Pulmonary:      Effort: Pulmonary effort is normal.      Breath sounds: Normal breath sounds. Abdominal:      General: Bowel sounds are normal.      Palpations: Abdomen is soft. Musculoskeletal:         General: Normal range of motion. Cervical back: Normal range of motion and neck supple. Skin:     General: Skin is warm and dry. Neurological:      Mental Status: She is alert and oriented to person, place, and time. Deep Tendon Reflexes: Reflexes are normal and symmetric. Psychiatric:         Behavior: Behavior normal.         Thought Content:  Thought content normal.         Judgment: Judgment normal.           Component      Latest Ref Rng & Units 2/26/2022 12/11/2021 7/24/2021 4/20/2021          10:13 AM  9:31 AM  8:28 AM  7:20 AM   Hemoglobin A1C      4.4 - 6.4 % 8.6 (H) 8.0 (H) 8.5 (H) 8.2 (H)   AVERAGE GLUCOSE      mg/dL 200 183 197 189       Immunization History   Administered Date(s) Administered    COVID-19, Pfizer Purple top, DILUTE for use, 12+ yrs, 30mcg/0.3mL dose 03/23/2021, 04/13/2021, 02/15/2022    Influenza 12/01/2012, 11/29/2013    Influenza A ([id number]) Vaccine PF IM 10/26/2009    Influenza Vaccine, unspecified formulation 11/09/2015, 10/03/2016, 10/23/2017    Influenza Virus Vaccine 12/01/2012, 11/29/2013, 12/10/2014, 11/09/2015, 10/23/2017, 10/24/2018, 10/25/2019, 10/14/2020, 10/24/2021    Influenza Whole 10/24/2008    Influenza, Quadv, IM, (6 mo and older Fluzone, Flulaval, Fluarix and 3 yrs and older Afluria) 10/03/2016    InfluenzaNisreen, IM, PF (6 mo and older Fluzone, Flulaval, Fluarix, and 3 yrs and older Afluria) 10/23/2018, 10/14/2020    Pneumococcal Polysaccharide ([id number]) 08/03/2011    Td, unspecified formulation 01/01/2008    Tdap (Boostrix, Adacel) 06/19/2009, 09/13/2017    Zoster Recombinant (Shingrix) 10/14/2020, 12/09/2020         Health Maintenance   Topic Date Due    Hepatitis B vaccine (1 of 3 - Risk 3-dose series) Never done    Diabetic foot exam  01/08/2022    Diabetic microalbuminuria test  04/20/2022    Depression Screen  04/26/2022    A1C test (Diabetic or Prediabetic)  05/26/2022    Diabetic retinal exam  08/25/2022    Lipid screen  12/11/2022    Potassium monitoring  12/11/2022    Creatinine monitoring  12/11/2022    Breast cancer screen  10/12/2023    Cervical cancer screen  02/15/2025    DTaP/Tdap/Td vaccine (3 - Td or Tdap) 09/13/2027    Colorectal Cancer Screen  12/17/2029    Pneumococcal 0-64 years Vaccine (2 of 2 - PPSV23) 09/30/2034    Flu vaccine  Completed    Shingles Vaccine  Completed    COVID-19 Vaccine  Completed    Hepatitis C screen  Completed    HIV screen  Addressed    Hepatitis A vaccine  Aged Out    Hib vaccine  Aged Out    Meningococcal (ACWY) vaccine  Aged Out       Future Appointments   Date Time Provider Adela Grace   6/29/2022  3:00 PM Lewis Baez, APRN - [address]         ASSESSMENT       Diagnosis Orders   1. Type 1 diabetes mellitus without complication ([facility] [address]     2. Hyperlipidemia, unspecified hyperlipidemia type     3.  Hypoglycemia unawareness associated with type 1 diabetes mellitus ([address]         PLAN          Home BP's- call if > 140/90 on a regular basis. (updated 12/27/2021)  Continue to work on diet, exercise, and weight loss for optimal cardiovascular health (updated 12/27/2021)  Pap per [facility] this year - no concerns  After discussion with pt, will hold on referral back to Dr. Temitope Leon for elevated ALK PHOS as values stable for many years. (updated 12/27/2021)  Pt should have labs for HG A1c, DLDL, alk phos, spot MA, and CBC in 3 months  Pt will call insurance to see what CGM is covered. Continue current medicines otherwise. No refills needed. Follow up in 3 months                 Preventive Health Topics:    PAP/ PELVIC done per [facility]- last appt 2021- to follow up annually- please call to check on date of next appt. (updated 12/27/2021)  MAMMO due after 10/12/2022  COLONOSCOPY done 12/17/2019 per Dr. Gerry Guardado- to do again in 12/2029.  (updated 12/27/2021)   Nazanin Modi EXAM- 8/25/2021- Dr. Renee Morrison- all ok- to do annually. (updated 12/27/2021)             Return in about 3 months (around 6/28/2022).         Electronically signed by CHELO Conte CNP on 4/6/2022 at 1:17 PM

## 2022-03-28 NOTE — PATIENT INSTRUCTIONS
Patient Education        Learning About Continuous Glucose Monitors (CGMs)  What is a continuous glucose monitor? A continuous glucose monitor (CGM) is a device that you attach to your body. It reads your blood sugar (glucose) level, day and night. If your blood sugar is too low, and you don't notice it, some CGMs can alert you in time to get treatment. Your doctor may also recommend using a CGM to help you stay in your target blood sugar range more consistently. How is a CGM used? A CGM has several parts. You wear one partthe sensoragainst your skin. It has a tiny needle that stays under your skin and constantly reads your blood sugar level. It sends this information to a wireless . The  can tell you if your blood sugar goes up or down and how fast the blood sugar level changes. And you can view the stored data to help you identify trends in your blood sugar level. Some insulin pumps include a CGM. In this case, the insulin pump is also the . The readings from the CGM can help you decide what to eat and how to exercise. And they can help you know how much medicine to take. You can note on the  when you do these things. That way you can see how those activities affect your blood sugar throughout the day and night. All this detailed information gives you and your doctor a better idea of what your treatment needs are. CGM technology is always changing and getting better. Here are some things to know about most CGMs. These may not apply to all systems. · Sensors need to be changed. Depending on the system, you may need to change the sensor every few days. Some systems have sensors that last 10 days. · Some CGMs will require that you prick your finger to confirm the CGM's accuracy. · Acetaminophen (Tylenol) can affect the results in some CGMs, reporting the readings higher than they actually are. Where can you learn more? Go to https://chtere.[employer]. org and sign in to your [employer] [facility] account. Enter [phone] in the [facility] box to learn more about \"Learning About Continuous Glucose Monitors (CGMs). \"     If you do not have an account, please click on the \"Sign Up Now\" link. Current as of: July 28, 2021               Content Version: 13.1  © 3344-1180 Healthwise, [employer]. Care instructions adapted under license by [facility] ([facility]). If you have questions about a medical condition or this instruction, always ask your healthcare professional. [address] any warranty or liability for your use of this information.

## 2022-03-29 ASSESSMENT — ENCOUNTER SYMPTOMS
DIARRHEA: 0
BLOOD IN STOOL: 0
SHORTNESS OF BREATH: 0
CONSTIPATION: 0
NAUSEA: 0
VOMITING: 0

## 2022-03-31 ENCOUNTER — TELEMEDICINE (OUTPATIENT)
Dept: FAMILY MEDICINE CLINIC | Age: 52
End: 2022-03-31
Payer: COMMERCIAL

## 2022-03-31 DIAGNOSIS — R09.81 SINUS CONGESTION: Primary | ICD-10-CM

## 2022-03-31 DIAGNOSIS — E10.9 TYPE 1 DIABETES MELLITUS WITHOUT COMPLICATION (HCC): ICD-10-CM

## 2022-03-31 PROCEDURE — 3052F HG A1C>EQUAL 8.0%<EQUAL 9.0%: CPT | Performed by: NURSE PRACTITIONER

## 2022-03-31 PROCEDURE — 99213 OFFICE O/P EST LOW 20 MIN: CPT | Performed by: NURSE PRACTITIONER

## 2022-03-31 RX ORDER — FLUTICASONE PROPIONATE 50 MCG
2 SPRAY, SUSPENSION (ML) NASAL DAILY
Qty: 16 G | Refills: 0 | Status: SHIPPED | OUTPATIENT
Start: 2022-03-31 | End: 2022-06-29

## 2022-03-31 ASSESSMENT — ENCOUNTER SYMPTOMS
SHORTNESS OF BREATH: 0
SORE THROAT: 1
DIARRHEA: 0
COUGH: 1
NAUSEA: 0
BLOOD IN STOOL: 0
VOMITING: 0
CONSTIPATION: 0

## 2022-03-31 NOTE — PROGRESS NOTES
[facility]  [address] [place] [address] Rd., [address] [id number]  Dept: [phone]  Dept Fax: [phone]      TELEHEALTH EVALUATION -- Audio/Visual (During MNNPB-26 public health emergency)     Amarilis Garcia, was evaluated through a synchronous (real-time) audio-video encounter. The patient (or guardian if applicable) is aware that this is a billable service, which includes applicable co-pays. This Virtual Visit was conducted with patient's (and/or legal guardian's) consent. The visit was conducted pursuant to the emergency declaration under the [address] [place] [address], [address] [place] [address] authority and the [employer] and [employer] Act. Patient identification was verified, and a caregiver was present when appropriate. The patient was located in a state where the provider was licensed to provide care. Valerie Parker is a 45 y.o. female    Pt states she was exposed to strong fumes possibly from paint while at work on Tuesday. Had sneezing, cough, sinus congestion, sinus pain, sore throat right away. Unfortunately, the symptoms have not improved since then. She is needing to prop herself up while sleeping to help her breath. She has been taking vitamin C but nothing else at this time. She did have a fever of 99.6 yesterday. Pt reports her blood sugars have not been too back lately.       Patient Active Problem List   Diagnosis    Hyperlipidemia    Alkaline phosphatase raised    Insulin dependent diabetes mellitus    Allergic rhinitis due to animal hair and dander    Type 1 diabetes mellitus without complication (HCC)       Current Outpatient Medications   Medication Sig Dispense Refill    fluticasone (FLONASE) 50 MCG/ACT nasal spray 2 sprays by Each Nostril route daily For 7-10 days 16 g 0    Multiple Vitamins-Minerals (THERAPEUTIC MULTIVITAMIN-MINERALS) tablet Take 1 tablet by mouth daily      lisinopril (PRINIVIL;ZESTRIL) 5 MG tablet Take 1 tablet by mouth daily 90 tablet 3    insulin lispro, 1 Unit Dial, (HUMALOG KWIKPEN) 100 UNIT/ML SOPN Inject 7 units with breakfast and 10 units with lunch and dinner along with sliding scale. Max daily dose 40 units. 12 pen 3    atorvastatin (LIPITOR) 40 MG tablet Take 1 tablet by mouth daily 90 tablet 3    blood glucose monitor kit and supplies Accu Chek blood glucose monitor, use daily as instructed, DX: E10.9 1 kit 0    insulin glargine (LANTUS) 100 UNIT/ML injection vial Inject SQ 15 units each am and 16 units nightly 4 each 3    Insulin Syringe-Needle U-100 30G X 1/2\" 0.5 ML MISC BD Insulin syringes. Use with Lantus insulin bid  Dx: E10.9 200 each 3    blood glucose monitor strips Accuchek testing strips. Test 5 times daily and prn. Dx: E10.9 500 strip 3    Probiotic Product (PROBIOTIC PO) Take by mouth      aspirin 81 MG EC tablet Take 81 mg by mouth daily      APPLE CIDER VINEGAR PO Drink with water at every meal      loratadine (CLARITIN) 10 MG tablet Take 10 mg by mouth nightly       Insulin Pen Needle (PEN NEEDLES 31GX5/16\") 31G X 8 MM MISC Use with Humalog KwikPen Dx: E11.9 100 each 5    coenzyme Q10 100 MG CAPS capsule Take 200 mg by mouth daily      Ascorbic Acid (VITAMIN C) 500 MG tablet Take 500 mg by mouth daily. No current facility-administered medications for this visit. Review of Systems   Constitutional: Negative for chills, diaphoresis and fever. HENT: Positive for congestion, sneezing and sore throat. Respiratory: Positive for cough. Negative for shortness of breath. Cardiovascular: Negative for chest pain, palpitations and leg swelling. Gastrointestinal: Negative for blood in stool, constipation, diarrhea, nausea and vomiting. Genitourinary: Negative for dysuria and hematuria. Musculoskeletal: Negative for myalgias. Neurological: Negative for dizziness and headaches. All other systems reviewed and are negative.       OBJECTIVE     Due to this being a TeleHealth encounter, evaluation of the following organ systems is limited: Vitals/Constitutional/EENT/Resp/CV/GI//MS/Neuro/Skin/Heme-Lymph-Imm. PHYSICAL EXAMINATION:  [ INSTRUCTIONS:  \"[x]\" Indicates a positive item  \"[]\" Indicates a negative item  -- DELETE ALL ITEMS NOT EXAMINED]  Vital Signs: (All Vital Signs are pt reported, unless otherwise noted)   There were no vitals taken for this visit. Constitutional: [x] Appears well-developed and well-nourished [x] No apparent distress      [] Abnormal-   Mental status  [x] Alert and awake  [x] Oriented to person/place/time [x]Able to follow commands      Eyes:  EOM    [x]  Normal  [] Abnormal-  Sclera  [x]  Normal  [] Abnormal -         Discharge [x]  None visible  [] Abnormal -    HENT:   [x] Normocephalic, atraumatic.   [] Abnormal   [x] Mouth/Throat: Mucous membranes are moist.     External Ears [x] Normal  [] Abnormal-     Neck: [x] No visualized mass     Pulmonary/Chest: [x] Respiratory effort normal.  [x] No visualized signs of difficulty breathing or respiratory distress        [] Abnormal-      Musculoskeletal:   [x] Normal gait with no signs of ataxia         [x] Normal range of motion of neck        [] Abnormal-       Neurological:        [x] No Facial Asymmetry (Cranial nerve 7 motor function) (limited exam to video visit)          [x] No gaze palsy        [] Abnormal-         Skin:        [x] No significant exanthematous lesions or discoloration noted on facial skin         [] Abnormal-            Psychiatric:       [x] Normal Affect [x] No Hallucinations        [] Abnormal-       Lab Results   Component Value Date    LABA1C 8.6 (H) 02/26/2022     No results found for: EAG    Lab Results   Component Value Date    CHOL 152 12/11/2021    TRIG 101 12/11/2021    HDL 52 12/11/2021    LDLCALC 80 12/11/2021    LDLDIRECT 87 07/24/2021       Lab Results   Component Value Date     12/11/2021    K 4.1 12/11/2021    CL 99 12/11/2021    CO2 28 12/11/2021    BUN 14 12/11/2021    CREATININE 0.63 12/11/2021    GLUCOSE 267 (H) 12/11/2021    CALCIUM 9.0 12/11/2021    PROT 6.9 12/11/2021    LABALBU 3.7 12/11/2021    BILITOT 0.6 12/11/2021    ALKPHOS 160 (H) 12/11/2021    AST 16 12/11/2021    ALT 15 12/11/2021       No results found for: LABMICR, [id number]    Lab Results   Component Value Date    TSH 1.83 12/11/2021    T4FREE 0.83 12/11/2021       Lab Results   Component Value Date    WBC 7.8 07/24/2021    HGB 13.3 07/24/2021    HCT 38.1 07/24/2021    MCV 85 07/24/2021     07/24/2021       No results found for: PSA, PSADIA      Immunization History   Administered Date(s) Administered    COVID-19, Pfizer Purple top, DILUTE for use, 12+ yrs, 30mcg/0.3mL dose 03/23/2021, 04/13/2021, 02/15/2022    Influenza 12/01/2012, 11/29/2013    Influenza A ([id number]) Vaccine PF IM 10/26/2009    Influenza Vaccine, unspecified formulation 11/09/2015, 10/03/2016, 10/23/2017    Influenza Virus Vaccine 12/01/2012, 11/29/2013, 12/10/2014, 11/09/2015, 10/23/2017, 10/24/2018, 10/25/2019, 10/14/2020, 10/24/2021    Influenza Whole 10/24/2008    Influenza, Quadv, IM, (6 mo and older Fluzone, Flulaval, Fluarix and 3 yrs and older Afluria) 10/03/2016    Influenza, Quadv, IM, PF (6 mo and older Fluzone, Flulaval, Fluarix, and 3 yrs and older Afluria) 10/23/2018, 10/14/2020    Pneumococcal Polysaccharide ([id number]) 08/03/2011    Td, unspecified formulation 01/01/2008    Tdap (Boostrix, Adacel) 06/19/2009, 09/13/2017    Zoster Recombinant (Shingrix) 10/14/2020, 12/09/2020       Health Maintenance   Topic Date Due    Hepatitis B vaccine (1 of 3 - Risk 3-dose series) Never done    Diabetic foot exam  01/08/2022    Depression Screen  04/26/2022    Diabetic microalbuminuria test  04/20/2022    A1C test (Diabetic or Prediabetic)  05/26/2022    Diabetic retinal exam  08/25/2022    Lipid screen  12/11/2022    Potassium monitoring  12/11/2022    Creatinine monitoring 12/11/2022    Breast cancer screen  10/12/2023    Cervical cancer screen  02/15/2025    DTaP/Tdap/Td vaccine (3 - Td or Tdap) 09/13/2027    Colorectal Cancer Screen  12/17/2029    Pneumococcal 0-64 years Vaccine (2 of 2 - PPSV23) 09/30/2034    Flu vaccine  Completed    Shingles Vaccine  Completed    COVID-19 Vaccine  Completed    Hepatitis C screen  Completed    HIV screen  Addressed    Hepatitis A vaccine  Aged Out    Hib vaccine  Aged Out    Meningococcal (ACWY) vaccine  Aged Out         Future Appointments   Date Time Provider Adela Grace   6/29/2022  3:00 PM CHELO Whelan CNP [facility] [address]       ASSESSMENT       Diagnosis Orders   1. Sinus congestion     2. Type 1 diabetes mellitus without complication (HCC)         PLAN     Recommend sinus rinses   Flonase and Mucinex DM  Follow up if symptoms fail to improve    Pursuant to the emergency declaration under the [employer] and the [place] [address] waiver authority and the [employer] and [employer] Act, this Virtual  Visit was conducted, with patient's consent, to reduce the patient's risk of exposure to COVID-19 and provide continuity of care for an established patient. Services were provided through a video synchronous discussion virtually to substitute for in-person clinic visit. Electronically signed by CHELO Almazan CNP on 3/31/2022 at 12:18 PM    Greater than 15 minutes was spent in contact with patient with greater than 50% in counseling and coordination of care.

## 2022-03-31 NOTE — LETTER
[address] [address]  Phone: [phone]  Fax: [phone]    CHELO Almazan CNP        March 31, 2022     Patient: Marilu Moser   YOB: 1970   Date of Visit: 3/31/2022       To Whom it May Concern:    Zarina Mullen was seen in my clinic on 3/31/2022. She may return to work on 4/4/22. If you have any questions or concerns, please don't hesitate to call.     Sincerely,         CHELO Almazan - CNP

## 2022-06-19 LAB
ABSOLUTE BASO #: 0.03 K/UL (ref 0–0.2)
ABSOLUTE EOS #: 0.48 K/UL (ref 0–0.5)
ABSOLUTE LYMPH #: 1.52 K/UL (ref 1–4)
ABSOLUTE MONO #: 0.38 K/UL (ref 0.2–1)
ABSOLUTE NEUT #: 4.2 K/UL (ref 1.5–7.5)
ALK PHOSPHATASE: 166 U/L (ref 40–132)
AVERAGE GLUCOSE: 203 MG/DL
BASOPHILS RELATIVE PERCENT: 0.5 %
CREATINE, URINE: 20.8 MG/DL
EOSINOPHILS RELATIVE PERCENT: 7.3 %
HBA1C MFR BLD: 8.7 % (ref 4.2–5.6)
HCT VFR BLD CALC: 36.6 % (ref 34–45)
HEMOGLOBIN: 12.2 G/DL (ref 11.5–15.5)
LDL CHOLESTEROL DIRECT: 118 MG/DL
LYMPHOCYTE %: 23 %
MCH RBC QN AUTO: 28.7 PG (ref 25–33)
MCHC RBC AUTO-ENTMCNC: 33.3 G/DL (ref 31–36)
MCV RBC AUTO: 86.1 FL (ref 80–99)
MICROALBUMIN/CREAT 24H UR: <12 MG/DL
MONOCYTES # BLD: 5.7 %
NEUTROPHILS RELATIVE PERCENT: 63.3 %
PDW BLD-RTO: 13.9 % (ref 11.5–15)
PLATELETS: 303 K/UL (ref 130–400)
PMV BLD AUTO: 10.3 FL (ref 9.3–13)
RBC: 4.25 M/UL (ref 3.8–5.4)
WBC: 6.6 K/UL (ref 3.5–11)

## 2022-06-22 ENCOUNTER — TELEPHONE (OUTPATIENT)
Dept: FAMILY MEDICINE CLINIC | Age: 52
End: 2022-06-22

## 2022-06-22 NOTE — TELEPHONE ENCOUNTER
----- Message from Maryjane Guerin sent at 6/22/2022  1:15 PM EDT -----  Subject: Appointment Request    Reason for Call: Routine Existing Condition Follow Up (Diabetes)    QUESTIONS  Type of Appointment? Established Patient  Reason for appointment request? No appointments available during search  Additional Information for Provider? 3 month Follow Up  DM  Screened   GREEN   ---------------------------------------------------------------------------  --------------  CALL BACK INFO  What is the best way for the office to contact you? OK to leave message on   voicemail  Preferred Call Back Phone Number? [phone]  ---------------------------------------------------------------------------  --------------  SCRIPT ANSWERS  Relationship to Patient? Self  Is this follow up request related to routine Diabetes Management? Yes  Have you been diagnosed with COVID-19 in the past 10 days? No  (Service Expert  click yes below to proceed with Dash Hudson As Usual   Scheduling)?  Yes

## 2022-06-29 ENCOUNTER — OFFICE VISIT (OUTPATIENT)
Dept: FAMILY MEDICINE CLINIC | Age: 52
End: 2022-06-29
Payer: COMMERCIAL

## 2022-06-29 VITALS
HEIGHT: 65 IN | RESPIRATION RATE: 16 BRPM | WEIGHT: 238 LBS | DIASTOLIC BLOOD PRESSURE: 80 MMHG | HEART RATE: 76 BPM | SYSTOLIC BLOOD PRESSURE: 126 MMHG | BODY MASS INDEX: 39.65 KG/M2

## 2022-06-29 DIAGNOSIS — E78.5 HYPERLIPIDEMIA, UNSPECIFIED HYPERLIPIDEMIA TYPE: ICD-10-CM

## 2022-06-29 DIAGNOSIS — E10.9 TYPE 1 DIABETES MELLITUS WITHOUT COMPLICATION (HCC): Primary | ICD-10-CM

## 2022-06-29 DIAGNOSIS — R74.8 ALKALINE PHOSPHATASE RAISED: ICD-10-CM

## 2022-06-29 PROCEDURE — 99214 OFFICE O/P EST MOD 30 MIN: CPT | Performed by: NURSE PRACTITIONER

## 2022-06-29 PROCEDURE — 3052F HG A1C>EQUAL 8.0%<EQUAL 9.0%: CPT | Performed by: NURSE PRACTITIONER

## 2022-06-29 ASSESSMENT — PATIENT HEALTH QUESTIONNAIRE - PHQ9
SUM OF ALL RESPONSES TO PHQ9 QUESTIONS 1 & 2: 0
SUM OF ALL RESPONSES TO PHQ QUESTIONS 1-9: 0
1. LITTLE INTEREST OR PLEASURE IN DOING THINGS: 0
SUM OF ALL RESPONSES TO PHQ QUESTIONS 1-9: 0
2. FEELING DOWN, DEPRESSED OR HOPELESS: 0

## 2022-06-29 ASSESSMENT — ENCOUNTER SYMPTOMS
NAUSEA: 0
SHORTNESS OF BREATH: 0
BLOOD IN STOOL: 0
CONSTIPATION: 0
VOMITING: 0
DIARRHEA: 0

## 2022-06-29 NOTE — PATIENT INSTRUCTIONS
Patient Education        Cholesterol and Triglycerides Tests: About These Tests  What are they? Cholesterol and triglycerides tests measure the amount of fats in your blood. These fats have both \"good\" (HDL) and \"bad\" (LDL) cholesterol. Why are these tests done? These tests are done to help find out your risk of a heart attack and stroke. Your doctor uses your cholesterol levels plus other things such as blood pressure, age, and sex to calculate your risk. These tests also help yourdoctor find out how well medicine is working for some health problems. How do you prepare for these tests?  Your doctor may ask you to not eat or drink anything except water for 9 to 14 hours before the tests. In most cases, you can take your medicines with water the morning of the test.   Do not drink alcohol for 24 hours before the tests.  Tell your doctor ALL the medicines, vitamins, supplements, and herbal remedies you take. Some may increase the risk of problems during your test. Your doctor will tell you if you should stop taking any of them before the test and how soon to do it. How are these tests done? A health professional uses a needle to take a blood sample, usually from thearm. Where can you learn more? Go to [email or web address]://[email or web address]. org and sign in to your [employer] account. Enter O402 in the [facility] box to learn more about \"Cholesterol and Triglycerides Tests: About These Tests. \"     If you do not have an account, please click on the \"Sign Up Now\" link. Current as of: January 10, 2022               Content Version: 13.3  © 2006-2022 Healthwise, Incorporated. Care instructions adapted under license by [facility] ([facility]). If you have questions about a medical condition or this instruction, always ask your healthcare professional. [place] [address] any warranty or liability for your use of this information.

## 2022-06-29 NOTE — PROGRESS NOTES
Chief Complaint   Patient presents with    3 Month Follow-Up     No concerns. Discuss labs. Keren Patient is a 45 y. o.female      Here for follow up of chronic health problems including:    Patient Active Problem List   Diagnosis    Hyperlipidemia    Alkaline phosphatase raised    Insulin dependent diabetes mellitus    Allergic rhinitis due to animal hair and dander    Type 1 diabetes mellitus without complication ([facility] [address]       Home BS are \"not too bad\". Has started going to a cardiac drum class for exercise. Has had some lows when she does this. Has been testing more frequently. Is trying to add more vegetables. States she has been snacking more while at work. Review of Systems   Constitutional: Negative for chills, diaphoresis and fever. Respiratory: Negative for shortness of breath. Cardiovascular: Negative for chest pain, palpitations and leg swelling. Gastrointestinal: Negative for blood in stool, constipation, diarrhea, nausea and vomiting. Genitourinary: Negative for dysuria and hematuria. Musculoskeletal: Negative for myalgias. Neurological: Negative for dizziness and headaches. All other systems reviewed and are negative. OBJECTIVE     /80   Pulse 76   Resp 16   Ht 5' 5\" (1.651 m)   Wt 238 lb (108 kg)   BMI 39.61 kg/m²     Wt Readings from Last 3 Encounters:   06/29/22 238 lb (108 kg)   03/28/22 236 lb 9.6 oz (107.3 kg)   07/29/21 235 lb 6.4 oz (106.8 kg)       Physical Exam  Vitals and nursing note reviewed. Constitutional:       Appearance: She is well-developed. HENT:      Head: Normocephalic and atraumatic. Right Ear: External ear normal.      Left Ear: External ear normal.      Nose: Nose normal.   Eyes:      Conjunctiva/sclera: Conjunctivae normal.      Pupils: Pupils are equal, round, and reactive to light. Cardiovascular:      Rate and Rhythm: Normal rate and regular rhythm. Heart sounds: Normal heart sounds. Pulmonary:      Effort: Pulmonary effort is normal.      Breath sounds: Normal breath sounds. Abdominal:      General: Bowel sounds are normal.      Palpations: Abdomen is soft. Musculoskeletal:         General: Normal range of motion. Cervical back: Normal range of motion and neck supple. Skin:     General: Skin is warm and dry. Neurological:      Mental Status: She is alert and oriented to person, place, and time. Deep Tendon Reflexes: Reflexes are normal and symmetric. Psychiatric:         Behavior: Behavior normal.         Thought Content:  Thought content normal.         Judgment: Judgment normal.       Component      Latest Ref Rng & Units 6/18/2022   WBC      3.5 - 11.0 K/uL 6.6   RBC      3.80 - 5.40 M/uL 4.25   Hemoglobin Quant      11.5 - 15.5 g/dL 12.2   Hematocrit      34.0 - 45.0 % 36.6   MCV      80.0 - 99.0 fL 86.1   MCH      25.0 - 33.0 pg 28.7   MCHC      31.0 - 36.0 g/dL 33.3   RDW      11.5 - 15.0 % 13.9   Neutrophils %      % 63.3   Lymphocyte %      % 23.0   Monocytes      % 5.7   Eosinophils %      % 7.3   Basophils %      % 0.5   Platelet Count      677 - 400 K/uL 303   Absolute Neut #      1.50 - 7.50 K/uL 4.20   Absolute Lymph #      1.00 - 4.00 K/uL 1.52   Absolute Mono #      0.20 - 1.00 K/uL 0.38   Absolute Eos #      0.00 - 0.50 K/uL 0.48   Basophils Absolute      0.00 - 0.20 K/uL 0.03   MPV      9.3 - 13.0 fL 10.3   Hemoglobin A1C      4.2 - 5.6 % 8.7 (H)   AVERAGE GLUCOSE      <117 MG/ (H)   Creatine, Urine      NOT ESTABL mg/dL 20.8   Microalb, Ur      NOT ESTABL mg/dL <12.0   LDL DIRECT,LDL      <100 mg/dL 118 (H)   Alk Phosphatase      40 - 132 U/L 166 (H)         Immunization History   Administered Date(s) Administered    COVID-19, PFIZER PURPLE top, DILUTE for use, (age 15 y+), 30mcg/0.3mL 03/23/2021, 04/13/2021, 02/15/2022    Influenza 12/01/2012, 11/29/2013    Influenza A ([id number]) Vaccine PF IM 10/26/2009    Influenza Vaccine, unspecified formulation 11/09/2015, 10/03/2016, 10/23/2017    Influenza Virus Vaccine 12/01/2012, 11/29/2013, 12/10/2014, 11/09/2015, 10/23/2017, 10/24/2018, 10/25/2019, 10/14/2020, 10/24/2021    Influenza Whole 10/24/2008    Influenza, Quadv, IM, (6 mo and older Fluzone, Flulaval, Fluarix and 3 yrs and older Afluria) 10/03/2016    Influenza, Quadv, IM, PF (6 mo and older Fluzone, Flulaval, Fluarix, and 3 yrs and older Afluria) 10/23/2018, 10/14/2020    Pneumococcal Polysaccharide ([id number]) 08/03/2011    Td, unspecified formulation 01/01/2008    Tdap (Boostrix, Adacel) 06/19/2009, 09/13/2017    Zoster Recombinant (Shingrix) 10/14/2020, 12/09/2020         Health Maintenance   Topic Date Due    Hepatitis B vaccine (1 of 3 - Risk 3-dose series) Never done    Pneumococcal 0-64 years Vaccine (2 - PCV) 08/03/2012    Diabetic foot exam  01/08/2022    Diabetic retinal exam  08/25/2022    A1C test (Diabetic or Prediabetic)  09/18/2022    Diabetic microalbuminuria test  06/18/2023    Lipids  06/18/2023    Depression Screen  06/29/2023    Breast cancer screen  10/12/2023    Cervical cancer screen  02/15/2025    DTaP/Tdap/Td vaccine (3 - Td or Tdap) 09/13/2027    Colorectal Cancer Screen  12/17/2029    Flu vaccine  Completed    Shingles vaccine  Completed    COVID-19 Vaccine  Completed    Hepatitis C screen  Completed    HIV screen  Addressed    Hepatitis A vaccine  Aged Out    Hib vaccine  Aged Out    Meningococcal (ACWY) vaccine  Aged Out       Future Appointments   Date Time Provider Adela Grace   9/13/2022  1:00 PM DO NICKY Page  [facility] [id number] [facility]         ASSESSMENT       Diagnosis Orders   1. Type 1 diabetes mellitus without complication (HCC)  Hemoglobin A1C   2. Hyperlipidemia, unspecified hyperlipidemia type  LDL Cholesterol, Direct   3.  Alkaline phosphatase raised         PLAN          Home BP's- call if > 140/90 on a regular basis.   Continue to work on diet, exercise, and weight loss for optimal cardiovascular health   After discussion with pt, will hold on referral back to Dr. Sherri Saldivar for elevated ALK PHOS as values stable for many years. (updated 12/27/2021)  Pt should have labs for HG A1c, LDL,  in 3 months  Would like to hold on decreasing atorvastatin to 80 mg and work on diet and exercise instead. Continue current medicines otherwise. Follow up in 3 months            Preventive Health Topics:    PAP/ PELVIC done per [facility]- last appt 2/15/2022- to follow up annually- please call to check on date of next appt. MAMMO due after 10/12/2022  COLONOSCOPY done 12/17/2019 per Dr. Sherri Saldivar- to do again in 12/2029.    [place] EXAM- 8/25/2021- Dr. Reyes Pal- all ok- to do annually.              Return in about 3 months (around 9/29/2022).         Electronically signed by CHELO Reese CNP on 6/30/2022 at 4:25 PM

## 2022-09-11 LAB
AVERAGE GLUCOSE: 177 MG/DL
HBA1C MFR BLD: 7.8 % (ref 4.2–5.6)
LDL CHOLESTEROL DIRECT: 79 MG/DL

## 2022-09-13 ENCOUNTER — OFFICE VISIT (OUTPATIENT)
Dept: FAMILY MEDICINE CLINIC | Age: 52
End: 2022-09-13
Payer: COMMERCIAL

## 2022-09-13 VITALS
HEIGHT: 65 IN | BODY MASS INDEX: 38.89 KG/M2 | SYSTOLIC BLOOD PRESSURE: 110 MMHG | HEART RATE: 85 BPM | TEMPERATURE: 97 F | OXYGEN SATURATION: 99 % | WEIGHT: 233.4 LBS | DIASTOLIC BLOOD PRESSURE: 60 MMHG | RESPIRATION RATE: 16 BRPM

## 2022-09-13 DIAGNOSIS — E10.9 TYPE 1 DIABETES MELLITUS WITHOUT COMPLICATION (HCC): Primary | ICD-10-CM

## 2022-09-13 DIAGNOSIS — E78.5 HYPERLIPIDEMIA, UNSPECIFIED HYPERLIPIDEMIA TYPE: ICD-10-CM

## 2022-09-13 DIAGNOSIS — Z23 NEED FOR COVID-19 VACCINE: ICD-10-CM

## 2022-09-13 PROCEDURE — 3051F HG A1C>EQUAL 7.0%<8.0%: CPT | Performed by: STUDENT IN AN ORGANIZED HEALTH CARE EDUCATION/TRAINING PROGRAM

## 2022-09-13 PROCEDURE — 99214 OFFICE O/P EST MOD 30 MIN: CPT | Performed by: STUDENT IN AN ORGANIZED HEALTH CARE EDUCATION/TRAINING PROGRAM

## 2022-09-13 PROCEDURE — 91312 COVID-19, PFIZER BIVALENT BOOSTER, (AGE 12Y+), IM, 30 MCG/0.3 ML: CPT | Performed by: FAMILY MEDICINE

## 2022-09-13 PROCEDURE — 0124A PR ADM SARSCOV2 BIVAL 30MCG/0.3ML BST: CPT | Performed by: FAMILY MEDICINE

## 2022-09-13 RX ORDER — INSULIN GLARGINE 100 [IU]/ML
INJECTION, SOLUTION SUBCUTANEOUS
Qty: 4 EACH | Refills: 3
Start: 2022-09-13 | End: 2022-10-18

## 2022-09-13 SDOH — HEALTH STABILITY: PHYSICAL HEALTH: ON AVERAGE, HOW MANY DAYS PER WEEK DO YOU ENGAGE IN MODERATE TO STRENUOUS EXERCISE (LIKE A BRISK WALK)?: 3 DAYS

## 2022-09-13 SDOH — HEALTH STABILITY: PHYSICAL HEALTH: ON AVERAGE, HOW MANY MINUTES DO YOU ENGAGE IN EXERCISE AT THIS LEVEL?: 60 MIN

## 2022-09-13 ASSESSMENT — ENCOUNTER SYMPTOMS
SHORTNESS OF BREATH: 0
NAUSEA: 0
VOMITING: 0
BLOOD IN STOOL: 0

## 2022-09-13 ASSESSMENT — SOCIAL DETERMINANTS OF HEALTH (SDOH)
WITHIN THE LAST YEAR, HAVE YOU BEEN HUMILIATED OR EMOTIONALLY ABUSED IN OTHER WAYS BY YOUR PARTNER OR EX-PARTNER?: NO
WITHIN THE LAST YEAR, HAVE YOU BEEN AFRAID OF YOUR PARTNER OR EX-PARTNER?: NO
WITHIN THE LAST YEAR, HAVE TO BEEN RAPED OR FORCED TO HAVE ANY KIND OF SEXUAL ACTIVITY BY YOUR PARTNER OR EX-PARTNER?: NO
WITHIN THE LAST YEAR, HAVE YOU BEEN KICKED, HIT, SLAPPED, OR OTHERWISE PHYSICALLY HURT BY YOUR PARTNER OR EX-PARTNER?: NO

## 2022-09-13 NOTE — PROGRESS NOTES
[id number] [address] [address] [facility]  Dept: [phone]  Dept Fax: [phone]    SHEILA Rudolph is a 45 y. o.female    Chief Complaint   Patient presents with    3 Month Follow-Up     Chief complaint, [place], and all pertinent details of the case reviewed with the resident. Please see resident's note for specific details discussed at today's visit. Patient Active Problem List   Diagnosis    Hyperlipidemia    Alkaline phosphatase raised    Insulin dependent diabetes mellitus    Allergic rhinitis due to animal hair and dander    Type 1 diabetes mellitus without complication (HCC)       Current Outpatient Medications   Medication Sig Dispense Refill    insulin glargine (LANTUS) 100 UNIT/ML injection vial Inject SQ 16 units each am and 15 units nightly 4 each 3    Multiple Vitamins-Minerals (THERAPEUTIC MULTIVITAMIN-MINERALS) tablet Take 1 tablet by mouth daily      lisinopril (PRINIVIL;ZESTRIL) 5 MG tablet Take 1 tablet by mouth daily 90 tablet 3    insulin lispro, 1 Unit Dial, (HUMALOG KWIKPEN) 100 UNIT/ML SOPN Inject 7 units with breakfast and 10 units with lunch and dinner along with sliding scale. Max daily dose 40 units. 12 pen 3    atorvastatin (LIPITOR) 40 MG tablet Take 1 tablet by mouth daily 90 tablet 3    blood glucose monitor kit and supplies Accu Chek blood glucose monitor, use daily as instructed, DX: E10.9 1 kit 0    Insulin Syringe-Needle U-100 30G X 1/2\" 0.5 ML MISC BD Insulin syringes. Use with Lantus insulin bid  Dx: E10.9 200 each 3    blood glucose monitor strips Accuchek testing strips. Test 5 times daily and prn.  Dx: E10.9 500 strip 3    Probiotic Product (PROBIOTIC PO) Take by mouth      aspirin 81 MG EC tablet Take 81 mg by mouth daily      APPLE CIDER VINEGAR PO Drink with water at every meal      loratadine (CLARITIN) 10 MG tablet Take 10 mg by mouth nightly       Insulin Pen Needle (PEN NEEDLES 31GX5/16\") 31G X 8 MM MISC Use with Humalog KwikPen Dx: E11.9 100 each 5    coenzyme Q10 100 MG CAPS capsule Take 200 mg by mouth daily      Ascorbic Acid (VITAMIN C) 500 MG tablet Take 500 mg by mouth daily. No current facility-administered medications for this visit. Review of Systems per resident physician    OBJECTIVE     /60 (Site: Left Upper Arm, Position: Sitting, Cuff Size: Medium Adult)   Pulse 85   Temp 97 °F (36.1 °C) (Skin)   Resp 16   Ht 5' 5\" (1.651 m)   Wt 233 lb 6.4 oz (105.9 kg)   SpO2 99%   BMI 38.84 kg/m²   BP Readings from Last 3 Encounters:   09/13/22 110/60   06/29/22 126/80   03/28/22 134/74       Wt Readings from Last 3 Encounters:   09/13/22 233 lb 6.4 oz (105.9 kg)   06/29/22 238 lb (108 kg)   03/28/22 236 lb 9.6 oz (107.3 kg)     Body mass index is 38.84 kg/m². Physical Exam  Vitals and nursing note reviewed. Constitutional:       General: She is not in acute distress. Appearance: Normal appearance. She is normal weight. She is not ill-appearing, toxic-appearing or diaphoretic. HENT:      Head: Normocephalic and atraumatic. Nose: Nose normal.   Eyes:      General: No scleral icterus. Right eye: No discharge. Left eye: No discharge. Extraocular Movements: Extraocular movements intact. Conjunctiva/sclera: Conjunctivae normal.      Pupils: Pupils are equal, round, and reactive to light. Neck:      Vascular: No carotid bruit. Cardiovascular:      Rate and Rhythm: Normal rate and regular rhythm. Heart sounds: Normal heart sounds. No murmur heard. No friction rub. No gallop. Pulmonary:      Effort: Pulmonary effort is normal. No respiratory distress. Breath sounds: Normal breath sounds. No stridor. No wheezing, rhonchi or rales. Abdominal:      General: Abdomen is flat. Bowel sounds are normal. There is no distension. Palpations: Abdomen is soft. There is no mass. Tenderness: There is no abdominal tenderness.  There is no guarding or rebound. Hernia: No hernia is present. Musculoskeletal:         General: No swelling or signs of injury. Normal range of motion. Cervical back: Normal range of motion. Right lower leg: No edema. Left lower leg: No edema. Skin:     General: Skin is warm and dry. Coloration: Skin is not jaundiced or pale. Findings: No bruising, erythema, lesion or rash. Neurological:      General: No focal deficit present. Mental Status: She is alert and oriented to person, place, and time. Psychiatric:         Mood and Affect: Mood normal.         Behavior: Behavior normal.         Thought Content: Thought content normal.         Judgment: Judgment normal.        No results found for this visit on 09/13/22. Lab Results   Component Value Date    LABA1C 7.8 (H) 09/10/2022       Lab Results   Component Value Date    CHOL 152 12/11/2021    TRIG 101 12/11/2021    HDL 52 12/11/2021    LDLCALC 80 12/11/2021    LDLDIRECT 79 09/10/2022       The 10-year ASCVD risk score (Kelby Grider et al., 2013) is: 1.6%    Values used to calculate the score:      Age: 45 years      Sex: Female      Is Non- : No      Diabetic: Yes      Tobacco smoker: No      Systolic Blood Pressure: 840 mmHg      Is BP treated: No      HDL Cholesterol: 52 mg/dL      Total Cholesterol: 152 mg/dL    Lab Results   Component Value Date     12/11/2021    K 4.1 12/11/2021    CL 99 12/11/2021    CO2 28 12/11/2021    BUN 14 12/11/2021    CREATININE 0.63 12/11/2021    GLUCOSE 267 (H) 12/11/2021    CALCIUM 9.0 12/11/2021    PROT 6.9 12/11/2021    LABALBU 3.7 12/11/2021    BILITOT 0.6 12/11/2021    ALKPHOS 166 (H) 06/18/2022    AST 16 12/11/2021    ALT 15 12/11/2021     CrCl cannot be calculated (Patient's most recent lab result is older than the maximum 180 days allowed. ).     Component      Latest Ref Rng & Units 6/18/2022           9:40 AM   Creatine, Urine      NOT ESTABL mg/dL 20.8 Microalb, Ur      NOT ESTABL mg/dL <12.0       Lab Results   Component Value Date    TSH 1.83 12/11/2021    T4FREE 0.83 12/11/2021       Lab Results   Component Value Date    WBC 6.6 06/18/2022    HGB 12.2 06/18/2022    HCT 36.6 06/18/2022    MCV 86.1 06/18/2022     06/18/2022           Immunization History   Administered Date(s) Administered    COVID-19, PFIZER Bivalent BOOSTER, (age 12y+), IM, 30 mcg/0.3 mL dose 09/13/2022    COVID-19, PFIZER PURPLE top, DILUTE for use, (age 15 y+), 30mcg/0.3mL 03/23/2021, 04/13/2021, 02/15/2022    Influenza 12/01/2012, 11/29/2013    Influenza A ([id number]) Vaccine PF IM 10/26/2009    Influenza Vaccine, unspecified formulation 11/09/2015, 10/03/2016, 10/23/2017    Influenza Virus Vaccine 12/01/2012, 11/29/2013, 12/10/2014, 11/09/2015, 10/23/2017, 10/24/2018, 10/25/2019, 10/14/2020, 10/24/2021    Influenza Whole 10/24/2008    Influenza, AFLURIA (age 1 yrs+), FLUZONE, (age 10 mo+), MDV, 0.5mL 10/03/2016    Influenza, FLUARIX, FLULAVAL, FLUZONE (age 10 mo+) AND AFLURIA, (age 1 y+), PF, 0.5mL 10/23/2018, 10/14/2020    Pneumococcal Polysaccharide ([id number]) 08/03/2011    Td, unspecified formulation 01/01/2008    Tdap (Boostrix, Adacel) 06/19/2009, 09/13/2017    Zoster Recombinant (Shingrix) 10/14/2020, 12/09/2020       Health Maintenance   Topic Date Due    Hepatitis B vaccine (1 of 3 - Risk 3-dose series) Never done    Pneumococcal 0-64 years Vaccine (2 - PCV) 08/03/2012    Flu vaccine (1) 09/01/2022    Diabetic retinal exam  08/25/2022    Breast cancer screen  10/12/2022    A1C test (Diabetic or Prediabetic)  12/10/2022    Diabetic microalbuminuria test  06/18/2023    Depression Screen  06/29/2023    Lipids  09/10/2023    Diabetic foot exam  09/13/2023    Cervical cancer screen  02/15/2025    DTaP/Tdap/Td vaccine (3 - Td or Tdap) 09/13/2027    Colorectal Cancer Screen  12/17/2029    Shingles vaccine  Completed    COVID-19 Vaccine  Completed    Hepatitis C screen  Completed HIV screen  Addressed    Hepatitis A vaccine  Aged Out    Hib vaccine  Aged Out    Meningococcal (ACWY) vaccine  Aged Out     CT Lung Screen (50-80, 20 pk-yrs, smoking or quit <15 years) indicated at this time? No tobacco history    Future Appointments   Date Time Provider Adela Grace   10/17/2022  7:00 AM STR MAMMOGRAPHY RM2  [facility]   12/13/2022  8:00 AM Cali Lias, DO [facility]     ASSESSMENT       Diagnosis Orders   1. Type 1 diabetes mellitus without complication (HCC)  HM DIABETES FOOT EXAM    Hemoglobin A1C    Comprehensive Metabolic Panel    Lipid, Fasting    TSH    T4, Free    insulin glargine (LANTUS) 100 UNIT/ML injection vial      2. Hyperlipidemia, unspecified hyperlipidemia type  Lipid, Fasting      3. Need for COVID-19 vaccine  COVID-19, PFIZER Bivalent BOOSTER, (age 12y+), IM, 30 mcg/0.3 mL          PLAN      After discussion with pt and resident provider, we agreed on plan as follows: Will change Lantus to 16 units every morning and 15 units nightly to try to help prevent nighttime hypoglycemia-patient call update on sugars in 1 week  Check HG A1c, FLP, CMP, free T4/TSH in 3 months  Continue current medications otherwise  Follow-up in 3 months or sooner if any further problems        Preventive Health Topics:   Bivalent COVID VACCINE booster today. Encouraged FLU VACCINE after October 1st annually. Encouraged PREVNAR 20  PAP/ PELVIC management per [facility]  MAMMO due after 10/12/2022  COLONOSCOPY done 12/17/2019 per Dr. Rosetta Evans- to follow up again in 2029. DILATED EYE EXAM to be done 10/6/2022          Attending Physician Statement  I have discussed the case, including pertinent history and exam findings with the resident. I also have seen the patient and performed key portions of the examination. I agree with the documented assessment and plan as documented by the resident.   GC modifier added  to this encounter     Electronically signed by Huber Gonsalez MD on 9/14/2022 at 1:15 PM

## 2022-09-13 NOTE — PROGRESS NOTES
Health Maintenance Due   Topic Date Due    Hepatitis B vaccine (1 of 3 - Risk 3-dose series) Never done    Pneumococcal 0-64 years Vaccine (2 - PCV) 08/03/2012    Diabetic foot exam  01/08/2022    COVID-19 Vaccine (4 - Booster for Pfizer series) 06/15/2022    Flu vaccine (1) 09/01/2022    Diabetic retinal exam  08/25/2022

## 2022-09-13 NOTE — PROGRESS NOTES
[id number] [facility]. [address] [place] [address]  Dept: [phone]  Dept Fax: [phone]  Loc: [phone]      Alice Madrid is a 45 y.o. female who presents today for:  Chief Complaint   Patient presents with    3 Month Follow-Up           HPI:   Alice Madrid is 45 y.o. who presents today for f/u chronic conditions. Checks blood sugars 6-7 times daily, states she is concerned that her blood sugar is low on occasion, if noting low 100s at night prior to bed will eat a small snack and not use full 16 units, will only take 14-15 units of lantus. About a month ago woke up to go to bathroom and checked blood sugar and was in the 40s, symptoms of hypoglycemia currently are just feeling unwell, not dizzy or sweaty. Is currently on 16 units lantus QHS, but is only using 14 units if not snacking at nights. Blood sugars ranging 120s-150s during the day. States that morning sugars are usually the highest, occasionally in the 200s. Running 120 range around lunch, dinner 160-180 depending on what eaten prior. Not using any fast acting at bedtime so that not dropping too low over night. Has been going to an exercise class over the last few months and since last A1c check. Has decreased carb intake as well. Lunch eating half a sandwich, a vegetable and a fruit, similar food typically for dinner. Diabetic retinal exam scheduled in October.      Objective:     Vitals:    09/13/22 1324   BP: 110/60   Pulse: 85   Resp: 16   Temp: 97 °F (36.1 °C)   SpO2: 99%         Wt Readings from Last 3 Encounters:   09/13/22 233 lb 6.4 oz (105.9 kg)   06/29/22 238 lb (108 kg)   03/28/22 236 lb 9.6 oz (107.3 kg)       BP Readings from Last 3 Encounters:   09/13/22 110/60   06/29/22 126/80   03/28/22 134/74       Lab Results   Component Value Date    WBC 6.6 06/18/2022    HGB 12.2 06/18/2022    HCT 36.6 06/18/2022    MCV 86.1 06/18/2022     06/18/2022     Lab Results   Component Value Date     12/11/2021    K 4.1 12/11/2021    CL 99 12/11/2021    CO2 28 12/11/2021    BUN 14 12/11/2021    CREATININE 0.63 12/11/2021    GLUCOSE 267 (H) 12/11/2021    CALCIUM 9.0 12/11/2021    PROT 6.9 12/11/2021    LABALBU 3.7 12/11/2021    BILITOT 0.6 12/11/2021    ALKPHOS 166 (H) 06/18/2022    AST 16 12/11/2021    ALT 15 12/11/2021     Lab Results   Component Value Date    TSH 1.83 12/11/2021    T4FREE 0.83 12/11/2021     Lab Results   Component Value Date    LABA1C 7.8 (H) 09/10/2022     No results found for: EAG  Lab Results   Component Value Date    CHOL 152 12/11/2021    CHOL 170 09/21/2020    CHOL 248 (H) 10/26/2019     Lab Results   Component Value Date    TRIG 101 12/11/2021    TRIG 119 09/21/2020    TRIG 91 07/20/2019     Lab Results   Component Value Date    HDL 52 12/11/2021    HDL 55 09/21/2020    HDL 62 07/20/2019     Lab Results   Component Value Date    LDLCALC 80 12/11/2021    LDLCALC 91 09/21/2020    LDLCALC 158 (H) 07/20/2019     No results found for: PSA, PSADIA  No results found for: [id number]  No results found for: VITD25       No results found for: LABMICR, [id number]    Review of Systems   Constitutional:  Negative for chills and fever. Respiratory:  Negative for shortness of breath. Cardiovascular:  Negative for chest pain. Gastrointestinal:  Negative for blood in stool, nausea and vomiting. Genitourinary:  Negative for hematuria. Neurological:  Negative for dizziness and light-headedness. Physical Exam  Constitutional:       Appearance: Normal appearance. HENT:      Head: Normocephalic and atraumatic. Cardiovascular:      Rate and Rhythm: Normal rate and regular rhythm. Heart sounds: Murmur (1-2 MATT RUSB = LUSB) heard. Pulmonary:      Effort: Pulmonary effort is normal. No respiratory distress. Breath sounds: Normal breath sounds. No wheezing. Abdominal:      General: Abdomen is flat. There is no distension. Palpations: Abdomen is soft. Tenderness: There is no abdominal tenderness. Skin:     General: Skin is warm. Neurological:      General: No focal deficit present. Mental Status: She is alert and oriented to person, place, and time.        Visual inspection:  Deformity/amputation: absent  Skin lesions/pre-ulcerative calluses: absent  Edema: right- negative, left- negative    Sensory exam:  Monofilament sensation: normal  (minimum of 5 random plantar locations tested, avoiding callused areas - > 1 area with absence of sensation is + for neuropathy)    Plus at least one of the following:  Pulses: normal  Immunization History   Administered Date(s) Administered    COVID-19, PFIZER Bivalent BOOSTER, (age 12y+), IM, 30 mcg/0.3 mL dose 09/13/2022    COVID-19, PFIZER PURPLE top, DILUTE for use, (age 15 y+), 30mcg/0.3mL 03/23/2021, 04/13/2021, 02/15/2022    Influenza 12/01/2012, 11/29/2013    Influenza A ([id number]) Vaccine PF IM 10/26/2009    Influenza Vaccine, unspecified formulation 11/09/2015, 10/03/2016, 10/23/2017    Influenza Virus Vaccine 12/01/2012, 11/29/2013, 12/10/2014, 11/09/2015, 10/23/2017, 10/24/2018, 10/25/2019, 10/14/2020, 10/24/2021    Influenza Whole 10/24/2008    Influenza, AFLURIA (age 1 yrs+), FLUZONE, (age 10 mo+), MDV, 0.5mL 10/03/2016    Influenza, FLUARIX, FLULAVAL, FLUZONE (age 10 mo+) AND AFLURIA, (age 1 y+), PF, 0.5mL 10/23/2018, 10/14/2020    Pneumococcal Polysaccharide ([id number]) 08/03/2011    Td, unspecified formulation 01/01/2008    Tdap (Boostrix, Adacel) 06/19/2009, 09/13/2017    Zoster Recombinant (Shingrix) 10/14/2020, 12/09/2020       Health Maintenance Due   Topic Date Due    Hepatitis B vaccine (1 of 3 - Risk 3-dose series) Never done    COVID-19 Vaccine (4 - Booster for Pfizer series) 06/15/2022    Flu vaccine (1) 09/01/2022    Diabetic retinal exam  08/25/2022    Breast cancer screen  10/12/2022       Food Insecurity: No Food Insecurity    Worried About Running Out of Food in the Last Year: Never true    Ran Out of Food in the Last Year: Never true       Assessment / Plan:   1. Type 1 diabetes mellitus without complication ([facility] [address]  - Foot exam done in office today  - Will adjust administration of long acting insulin to decrease night dose and increase morning dose, currently change to 16 units QAM and 15 units QHS   - Patient to keep blood sugar log to review at next appointment and continue with exercise and dietary modifications and monitoring blood sugars with these changes   - Yearly blood work ordered and A1c to repeat week prior to next appointment in 3 months   -  DIABETES FOOT EXAM  - Hemoglobin A1C; Future  - Comprehensive Metabolic Panel; Future  - Lipid, Fasting; Future  - TSH; Future  - T4, Free; Future  - insulin glargine (LANTUS) 100 UNIT/ML injection vial; Inject SQ 16 units each am and 15 units nightly  Dispense: 4 each; Refill: 3    2. Hyperlipidemia, unspecified hyperlipidemia type  - Obtain full fasting lipid panel week prior to next appointment in 3 months   - Lipid, Fasting; Future    3. Need for COVID-19 vaccine  - COVID-19, PFIZER Bivalent BOOSTER, (age 12y+), IM, 30 mcg/0.3 mL      Preventive Health Topics:     PAP/ PELVIC done per [facility]- last appt 2/15/2022- to follow up annually  MAMMO due after 10/12/2022  COLONOSCOPY done 12/17/2019 per Dr. Savita Du- to do again in 12/2029. Le [place]- 8/25/2021- Dr. Arpan Peña- all ok- to do annually, has appointment schedule 10/6  Discussed PNA and influenza vaccinations, will hold off today and further discuss at next appointment per pt request   Covid booster given 9/13/22, bivalent 4th dose        Return in about 3 months (around 12/13/2022) for T1DM F/u . Future Appointments   Date Time Provider Adela Grace   10/17/2022  7:00 AM STR [facility] [address] [facility]       Patient given educational materials - see patient instructions.   Discussed use, benefit, and sideeffects of prescribed medications. All patient questions answered. Pt voiced understanding. Reviewed health maintenance. Instructed to continue current medications, diet and exercise. Patient agreed with treatment plan. Follow up as directed.      Electronically signed by Neli Frye DO on 9/13/2022 at 2:22 PM

## 2022-09-13 NOTE — PATIENT INSTRUCTIONS
Reach out to insurance regarding coverage of Eversense E3 continuous glucose monitor, can discuss at next appointment if covered.

## 2022-09-13 NOTE — PROGRESS NOTES
Immunizations Administered       Name Date Dose Route    COVID-19, PFIZER Bivalent BOOSTER, (age 12y+), IM, 30 mcg/0.3 mL dose 9/13/2022 0.3 mL Intramuscular    Site: Deltoid- Left    Lot: [id number]    NDC: Jaspreet Arellano 1452, [facility]  9/13/2022 2:15 PM     For Pharmacy Admin Tracking Only    CPA in place:  No  Recommendation Provided To: Provider: 1 via Verbally to provider  Intervention Detail: Vaccine Recommended/Administered  Gap Closed?: No   Intervention Accepted By: Provider: 1  Time Spent (min): 10

## 2022-10-17 ENCOUNTER — HOSPITAL ENCOUNTER (OUTPATIENT)
Dept: WOMENS IMAGING | Age: 52
Discharge: HOME | End: 2022-10-17
Payer: COMMERCIAL

## 2022-10-17 DIAGNOSIS — Z12.31 VISIT FOR SCREENING MAMMOGRAM: ICD-10-CM

## 2022-10-17 PROCEDURE — 77063 BREAST TOMOSYNTHESIS BI: CPT

## 2022-10-18 DIAGNOSIS — E10.9 TYPE 1 DIABETES MELLITUS WITHOUT COMPLICATION (HCC): ICD-10-CM

## 2022-10-18 RX ORDER — PEN NEEDLE, DIABETIC 29 G X1/2"
NEEDLE, DISPOSABLE MISCELLANEOUS
Qty: 200 EACH | Refills: 3 | Status: SHIPPED | OUTPATIENT
Start: 2022-10-18

## 2022-10-18 RX ORDER — INSULIN GLARGINE 100 [IU]/ML
INJECTION, SOLUTION SUBCUTANEOUS
Qty: 40 ML | Refills: 3 | Status: SHIPPED | OUTPATIENT
Start: 2022-10-18

## 2022-10-18 NOTE — TELEPHONE ENCOUNTER
Last visit- 9/13/2022  Next visit- 12/13/2022    Requested Prescriptions     Pending Prescriptions Disp Refills    Insulin Syringe-Needle U-100 (BD INSULIN SYRINGE U/F) 30G X 1/2\" 0.5 ML MISC [Pharmacy Med Name: BD Insulin Syringe U/F Miscellaneous 30G X 1/2\" 0.5 ML]  0     Sig: use with lantus twice daily    insulin glargine (LANTUS) 100 UNIT/ML injection vial [Pharmacy Med Name: Lantus Subcutaneous Solution 100 UNIT/ML] 40 mL 0     Sig: INJECT 15 UNITS SUBCUTANEOUSLY IN THE MORNING AND 16 UNITS NIGHTLY

## 2022-12-05 LAB
ALBUMIN SERPL-MCNC: 3.9 G/DL
ALP BLD-CCNC: 186 U/L
ALT SERPL-CCNC: 20 U/L
ANION GAP SERPL CALCULATED.3IONS-SCNC: 9 MMOL/L
AST SERPL-CCNC: 20 U/L
AVERAGE GLUCOSE: NORMAL
BILIRUB SERPL-MCNC: 0.6 MG/DL (ref 0.1–1.4)
BUN BLDV-MCNC: 12 MG/DL
CALCIUM SERPL-MCNC: 8.9 MG/DL
CHLORIDE BLD-SCNC: 98 MMOL/L
CHOLESTEROL, TOTAL: 16 MG/DL
CHOLESTEROL/HDL RATIO: 2.8
CO2: 29 MMOL/L
CREAT SERPL-MCNC: 0.77 MG/DL
GFR CALCULATED: 92
GLUCOSE BLD-MCNC: 251 MG/DL
HBA1C MFR BLD: 7.7 %
HDLC SERPL-MCNC: 58 MG/DL (ref 35–70)
LDL CHOLESTEROL CALCULATED: 85 MG/DL (ref 0–160)
NONHDLC SERPL-MCNC: NORMAL MG/DL
POTASSIUM SERPL-SCNC: 4.5 MMOL/L
SODIUM BLD-SCNC: 136 MMOL/L
TOTAL PROTEIN: 6.7
TRIGL SERPL-MCNC: 83 MG/DL
VLDLC SERPL CALC-MCNC: 17 MG/DL

## 2022-12-08 ENCOUNTER — TELEPHONE (OUTPATIENT)
Dept: FAMILY MEDICINE CLINIC | Age: 52
End: 2022-12-08

## 2022-12-08 NOTE — TELEPHONE ENCOUNTER
----- Message from Naomie Jonas DO sent at 12/7/2022  8:11 PM EST -----  Trish Alvarenga: We will discuss the results of your blood work at your next appointment, everything looks stable compared to previous checks.     Dr. Comfort Burgess

## 2022-12-12 NOTE — PROGRESS NOTES
[address] [facility]. [address] [place] [address]  Dept: [phone]  Dept Fax: [phone]  Loc: [phone]      Anita Kogn is a 47 y.o. female who presents today for:  Chief Complaint   Patient presents with    Follow-up    Medication Refill         HPI:   Anita Kong is 47 y.o. who presents today for f/u. T1DM: A1c 7.7 on 12/5/22. Last visit changed to 16 units QAM and 15 units QHS. Occasionally waking up around 2-3AM and noting blood sugars in the 40s, has started checking sugars when she wakes up in the middle of the night. Has also had some in the 200s when waking up at night. Feels like it varies depending on activity level during the day. Last night blood sugar was 160 prior to dinner, only used 3 units because had exercise class and was 113 after exercise class. Was 213 at bedtime. Blood sugar was 113 this morning, did not use the humalog. Blood sugars ranging from 80s-160s at meal time checks. Has not been good about keeping diet log. Night time sugars vary a lot depending on activity levels and what she eats throughout the day. Last eye doctor appointment was 10/5/22, saw Dr. Rigoberto Ledezma. States no concerns for diabetes, has cataracts starting but no interventions at this time.        Objective:     Vitals:    12/13/22 0807   BP: 124/72   Resp: 16   Temp: 97.6 °F (36.4 °C)         Wt Readings from Last 3 Encounters:   12/13/22 227 lb 6.4 oz (103.1 kg)   09/13/22 233 lb 6.4 oz (105.9 kg)   06/29/22 238 lb (108 kg)       BP Readings from Last 3 Encounters:   12/13/22 124/72   09/13/22 110/60   06/29/22 126/80       Lab Results   Component Value Date    WBC 6.6 06/18/2022    HGB 12.2 06/18/2022    HCT 36.6 06/18/2022    MCV 86.1 06/18/2022     06/18/2022     Lab Results   Component Value Date     12/05/2022    K 4.5 12/05/2022    CL 98 12/05/2022    CO2 29 12/05/2022    BUN 12 12/05/2022 CREATININE 0.77 12/05/2022    GLUCOSE 251 12/05/2022    CALCIUM 8.9 12/05/2022    PROT 6.9 12/11/2021    LABALBU 3.9 12/05/2022    BILITOT 0.6 12/05/2022    ALKPHOS 186 12/05/2022    AST 20 12/05/2022    ALT 20 12/05/2022    LABGLOM 92 12/05/2022     Lab Results   Component Value Date    TSH 1.83 12/11/2021    T4FREE 0.83 12/11/2021     Lab Results   Component Value Date    LABA1C 7.7 12/05/2022     No results found for: EAG  Lab Results   Component Value Date    CHOL 16 12/05/2022    CHOL 152 12/11/2021    CHOL 170 09/21/2020     Lab Results   Component Value Date    TRIG 83 12/05/2022    TRIG 101 12/11/2021    TRIG 119 09/21/2020     Lab Results   Component Value Date    HDL 58 12/05/2022    HDL 52 12/11/2021    HDL 55 09/21/2020     Lab Results   Component Value Date    LDLCALC 85 12/05/2022    LDLCALC 80 12/11/2021    LDLCALC 91 09/21/2020     No results found for: PSA, PSADIA  No results found for: [id number]  No results found for: VITD25       No results found for: LABMICR, [id number]    Review of Systems   Constitutional:  Negative for chills and fever. Respiratory:  Negative for shortness of breath. Cardiovascular:  Negative for chest pain and palpitations. Gastrointestinal:  Negative for abdominal pain, blood in stool, nausea and vomiting. Endocrine: Positive for polyuria. Negative for polydipsia. Genitourinary:  Negative for hematuria. Skin:  Negative for rash. Neurological:  Negative for dizziness and light-headedness. Physical Exam  Constitutional:       Appearance: Normal appearance. She is obese. HENT:      Head: Normocephalic and atraumatic. Mouth/Throat:      Mouth: Mucous membranes are moist.   Cardiovascular:      Rate and Rhythm: Normal rate and regular rhythm. Heart sounds: Normal heart sounds. No murmur heard. No gallop. Pulmonary:      Effort: Pulmonary effort is normal.      Breath sounds: Normal breath sounds. Abdominal:      General: Abdomen is flat. There is no distension. Palpations: Abdomen is soft. Tenderness: There is no abdominal tenderness. Skin:     General: Skin is warm. Neurological:      General: No focal deficit present. Mental Status: She is alert and oriented to person, place, and time. Immunization History   Administered Date(s) Administered    COVID-19, PFIZER Bivalent BOOSTER, (age 12y+), IM, 30 mcg/0.3 mL dose 09/13/2022    COVID-19, PFIZER PURPLE top, DILUTE for use, (age 15 y+), 30mcg/0.3mL 03/23/2021, 04/13/2021, 02/15/2022    Influenza 12/01/2012, 11/29/2013    Influenza A ([id number]) Vaccine PF IM 10/26/2009    Influenza Vaccine, unspecified formulation 11/09/2015, 10/03/2016, 10/23/2017    Influenza Virus Vaccine 12/01/2012, 11/29/2013, 12/10/2014, 11/09/2015, 10/23/2017, 10/24/2018, 10/25/2019, 10/14/2020, 10/24/2021    Influenza Whole 10/24/2008    Influenza, AFLURIA (age 1 yrs+), FLUZONE, (age 10 mo+), MDV, 0.5mL 10/03/2016    Influenza, FLUARIX, FLULAVAL, FLUZONE (age 10 mo+) AND AFLURIA, (age 1 y+), PF, 0.5mL 10/23/2018, 10/14/2020    Pneumococcal Polysaccharide ([id number]) 08/03/2011    Td, unspecified formulation 01/01/2008    Tdap (Boostrix, Adacel) 06/19/2009, 09/13/2017    Zoster Recombinant (Shingrix) 10/14/2020, 12/09/2020       Health Maintenance Due   Topic Date Due    Hepatitis B vaccine (1 of 3 - Risk 3-dose series) Never done       Food Insecurity: No Food Insecurity    Worried About [address] [place] in the Last Year: Never true    Ran Out of Food in the Last Year: Never true       Assessment / Plan:   1.  Type 1 diabetes mellitus without complication (HCC)  - Y8Q 7.7 on most recent labs   - Will not make any adjustments to insulin regimen at this time, patient frequency of night time hypoglycemic episodes has improved but still occasionally noticing  - Testing more frequently than prescription, will order new prescription for lancets and strips to test 7-8 times daily and ordered prescription for CGM  - Patient instructed to f/u in office sooner for appointment if noticing fluctuating and abnormal blood sugars, worsening hypoglycemic or hyperglycemic episodes. She adjusts her own dosing at home depending on blood sugar readings, diet throughout the day and frequency of exercise and feels she is able to control this well. Counseled extensively   - blood glucose monitor strips; Accuchek testing strips. Test 7-8 times daily and prn. Dx: E10.9  Dispense: 500 strip; Refill: 3  - Accu-Chek Multiclix Lancets MISC; Use to test blood sugar seven-eight times daily and as needed. Dispense lancets to match patient's meter. Dx: E10.9  Dispense: 400 each; Refill: 3  - Continuous Blood Gluc  (DEXCOM G6 ) MELINDA; 1 box by Does not apply route daily Use to check blood sugar as indicated  Dispense: 4 each; Refill: 5  - Continuous Blood Gluc Sensor (DEXCOM G4 SENSOR) MISC; 1 box by Does not apply route daily Use to check blood sugar continuously as indicated  Dispense: 7 each; Refill: 5    2. Hyperlipidemia, unspecified hyperlipidemia type  - Controlled, continue Lipitor         PAP/ PELVIC done per [facility]- last appt 2/15/2022- to follow up annually  MAMMO done on 10/17/2022  COLONOSCOPY done 12/17/2019 per Dr. Naomi Brown- to do again in 12/2029. DILATED EYE EXAM- 10/5/2022 per pt - Dr. Hakan Duenas- stated beginning to develop cataracts - to do annually  Covid booster given 9/13/22, bivalent 4th dose        Return in about 3 months (around 3/13/2023). Future Appointments   Date Time Provider Adela Grace   3/14/2023  8:00 AM Carlis Gaucher, DO [facility] [address]   1/18/2024  8:00 AM Aliya Mcpherson MD [address]         Patient given educational materials - see patient instructions. Discussed use, benefit, and sideeffects of prescribed medications. All patient questions answered. Pt voiced understanding. Reviewed health maintenance.   Instructed to continue current medications, diet and exercise. Patient agreed with treatment plan. Follow up as directed.      Electronically signed by Mary Ahn DO on 12/13/2022 at 9:27 AM

## 2022-12-13 ENCOUNTER — TELEPHONE (OUTPATIENT)
Dept: FAMILY MEDICINE CLINIC | Age: 52
End: 2022-12-13

## 2022-12-13 ENCOUNTER — OFFICE VISIT (OUTPATIENT)
Dept: FAMILY MEDICINE CLINIC | Age: 52
End: 2022-12-13
Payer: COMMERCIAL

## 2022-12-13 VITALS
WEIGHT: 227.4 LBS | SYSTOLIC BLOOD PRESSURE: 124 MMHG | BODY MASS INDEX: 37.89 KG/M2 | DIASTOLIC BLOOD PRESSURE: 72 MMHG | TEMPERATURE: 97.6 F | HEIGHT: 65 IN | RESPIRATION RATE: 16 BRPM

## 2022-12-13 DIAGNOSIS — E10.9 TYPE 1 DIABETES MELLITUS WITHOUT COMPLICATION (HCC): Primary | ICD-10-CM

## 2022-12-13 DIAGNOSIS — E78.5 HYPERLIPIDEMIA, UNSPECIFIED HYPERLIPIDEMIA TYPE: ICD-10-CM

## 2022-12-13 DIAGNOSIS — E66.9 CLASS 2 OBESITY WITH BODY MASS INDEX (BMI) OF 37.0 TO 37.9 IN ADULT, UNSPECIFIED OBESITY TYPE, UNSPECIFIED WHETHER SERIOUS COMORBIDITY PRESENT: ICD-10-CM

## 2022-12-13 PROCEDURE — 99213 OFFICE O/P EST LOW 20 MIN: CPT | Performed by: STUDENT IN AN ORGANIZED HEALTH CARE EDUCATION/TRAINING PROGRAM

## 2022-12-13 PROCEDURE — 3051F HG A1C>EQUAL 7.0%<8.0%: CPT | Performed by: STUDENT IN AN ORGANIZED HEALTH CARE EDUCATION/TRAINING PROGRAM

## 2022-12-13 RX ORDER — BLOOD-GLUCOSE,RECEIVER,CONT
1 EACH MISCELLANEOUS DAILY
Qty: 4 EACH | Refills: 5 | Status: SHIPPED | OUTPATIENT
Start: 2022-12-13 | End: 2022-12-13

## 2022-12-13 RX ORDER — BLOOD-GLUCOSE,RECEIVER,CONT
1 EACH MISCELLANEOUS DAILY
Qty: 1 EACH | Refills: 5 | Status: SHIPPED | OUTPATIENT
Start: 2022-12-13

## 2022-12-13 RX ORDER — BLOOD-GLUCOSE SENSOR
1 EACH MISCELLANEOUS DAILY
Qty: 1 EACH | Refills: 5 | Status: SHIPPED | OUTPATIENT
Start: 2022-12-13

## 2022-12-13 RX ORDER — LANCETS
EACH MISCELLANEOUS
Qty: 400 EACH | Refills: 3 | Status: SHIPPED | OUTPATIENT
Start: 2022-12-13

## 2022-12-13 RX ORDER — GLUCOSAMINE HCL/CHONDROITIN SU 500-400 MG
CAPSULE ORAL
Qty: 500 STRIP | Refills: 3 | Status: SHIPPED | OUTPATIENT
Start: 2022-12-13

## 2022-12-13 RX ORDER — PEN NEEDLE, DIABETIC 31 GX5/16"
NEEDLE, DISPOSABLE MISCELLANEOUS
Qty: 100 EACH | Refills: 5 | Status: CANCELLED | OUTPATIENT
Start: 2022-12-13

## 2022-12-13 RX ORDER — BLOOD-GLUCOSE SENSOR
1 EACH MISCELLANEOUS DAILY
Qty: 7 EACH | Refills: 5 | Status: SHIPPED | OUTPATIENT
Start: 2022-12-13

## 2022-12-13 ASSESSMENT — ENCOUNTER SYMPTOMS
VOMITING: 0
SHORTNESS OF BREATH: 0
ABDOMINAL PAIN: 0
NAUSEA: 0
BLOOD IN STOOL: 0

## 2022-12-13 NOTE — TELEPHONE ENCOUNTER
Ananda called about the dexcom. The  onnly needs to be sent in for 1 (should last for a very long time). The sensor was written for G4 (pharmacists says that they don't even have G4 anymore. They are asking for the scripts to be resent correctly.

## 2022-12-13 NOTE — PROGRESS NOTES
Health Maintenance Due   Topic Date Due    Hepatitis B vaccine (1 of 3 - Risk 3-dose series) Never done    Diabetic retinal exam  08/25/2022

## 2022-12-16 ENCOUNTER — PATIENT MESSAGE (OUTPATIENT)
Dept: FAMILY MEDICINE CLINIC | Age: 52
End: 2022-12-16

## 2022-12-16 ENCOUNTER — TELEPHONE (OUTPATIENT)
Dept: FAMILY MEDICINE CLINIC | Age: 52
End: 2022-12-16

## 2022-12-16 DIAGNOSIS — E10.9 TYPE 1 DIABETES MELLITUS WITHOUT COMPLICATION (HCC): ICD-10-CM

## 2022-12-16 DIAGNOSIS — E10.9 TYPE 1 DIABETES MELLITUS WITHOUT COMPLICATION (HCC): Primary | ICD-10-CM

## 2022-12-16 NOTE — TELEPHONE ENCOUNTER
[address] [facility]. called stating they received a prescription for  a G6  and a G4 sensor and that these need to match. They also need to know how many times the pt. Is to test her sugars and the strips. Please advise.

## 2022-12-16 NOTE — TELEPHONE ENCOUNTER
Patient is using strips and lancets to check blood sugar 7-8 times daily. The Dexcom prescriptions have been ordered for sensor and  with G6, should be taken care of. For directions for Dexcom monitor, to be used for continuous monitoring of blood sugars with checks 7-8 times daily.     Electronically signed by Poonam Bundy DO on 12/16/2022 at 1:33 PM

## 2022-12-20 ENCOUNTER — TELEPHONE (OUTPATIENT)
Dept: FAMILY MEDICINE CLINIC | Age: 52
End: 2022-12-20

## 2022-12-21 RX ORDER — GLUCOSAMINE HCL/CHONDROITIN SU 500-400 MG
CAPSULE ORAL
Qty: 750 STRIP | Refills: 3 | Status: SHIPPED | OUTPATIENT
Start: 2022-12-21

## 2022-12-21 NOTE — TELEPHONE ENCOUNTER
Initiated the PA for the transmitter as well. Can you send the script for the strips in for her please? Changed to 90 day supply. Thank you!

## 2022-12-27 NOTE — TELEPHONE ENCOUNTER
Forms signed and placed in completed basket.      Electronically signed by Wander Link DO on 12/27/2022 at 5:37 PM

## 2023-01-19 DIAGNOSIS — E78.5 HYPERLIPIDEMIA, UNSPECIFIED HYPERLIPIDEMIA TYPE: ICD-10-CM

## 2023-01-19 RX ORDER — ATORVASTATIN CALCIUM 40 MG/1
40 TABLET, FILM COATED ORAL DAILY
Qty: 90 TABLET | Refills: 3 | Status: SHIPPED | OUTPATIENT
Start: 2023-01-19

## 2023-02-06 ENCOUNTER — PATIENT MESSAGE (OUTPATIENT)
Dept: FAMILY MEDICINE CLINIC | Age: 53
End: 2023-02-06

## 2023-02-06 DIAGNOSIS — E10.9 TYPE 1 DIABETES MELLITUS WITHOUT COMPLICATION (HCC): Primary | ICD-10-CM

## 2023-02-09 NOTE — TELEPHONE ENCOUNTER
From: Alice Madrid  To: Neli Frye DO  Sent: 2/6/2023 3:35 PM EST  Subject: Approval needed    I have a new Insurance ( I entered the Medical [employer] in the system) I need to get pre-approval for the Sensor and the Transmitter. Unsure if we need to do another for the Reciever (only get 1 a year I believe) I can fax the card over to you all if needed I tries to upload it but it said the file was too large.

## 2023-02-14 RX ORDER — BLOOD-GLUCOSE,RECEIVER,CONT
1 EACH MISCELLANEOUS DAILY
Qty: 1 EACH | Refills: 5 | Status: SHIPPED | OUTPATIENT
Start: 2023-02-14

## 2023-02-14 RX ORDER — BLOOD-GLUCOSE SENSOR
1 EACH MISCELLANEOUS DAILY
Qty: 1 EACH | Refills: 5 | Status: SHIPPED | OUTPATIENT
Start: 2023-02-14

## 2023-02-28 RX ORDER — INSULIN LISPRO 100 [IU]/ML
INJECTION, SOLUTION INTRAVENOUS; SUBCUTANEOUS
Qty: 12 ADJUSTABLE DOSE PRE-FILLED PEN SYRINGE | Refills: 3 | Status: SHIPPED | OUTPATIENT
Start: 2023-02-28

## 2023-02-28 NOTE — TELEPHONE ENCOUNTER
Patient's last appointment was : 12/13/2022  Patient's next appointment is :  03/14/2023  Last refilled: 03/07/2022  Yes Pharmacy Verified    Lab Results   Component Value Date    LABA1C 7.7 12/05/2022     Lab Results   Component Value Date    CHOL 16 12/05/2022    TRIG 83 12/05/2022    HDL 58 12/05/2022    LDLCALC 85 12/05/2022    LDLDIRECT 79 09/10/2022     Lab Results   Component Value Date     12/05/2022    K 4.5 12/05/2022    CL 98 12/05/2022    CO2 29 12/05/2022    BUN 12 12/05/2022    CREATININE 0.77 12/05/2022    GLUCOSE 251 12/05/2022    CALCIUM 8.9 12/05/2022    PROT 6.9 12/11/2021    LABALBU 3.9 12/05/2022    BILITOT 0.6 12/05/2022    ALKPHOS 186 12/05/2022    AST 20 12/05/2022    ALT 20 12/05/2022    LABGLOM 92 12/05/2022     Lab Results   Component Value Date    TSH 1.83 12/11/2021    T4FREE 0.83 12/11/2021     Lab Results   Component Value Date    WBC 6.6 06/18/2022    HGB 12.2 06/18/2022    HCT 36.6 06/18/2022    MCV 86.1 06/18/2022     06/18/2022

## 2023-03-12 SDOH — ECONOMIC STABILITY: FOOD INSECURITY: WITHIN THE PAST 12 MONTHS, THE FOOD YOU BOUGHT JUST DIDN'T LAST AND YOU DIDN'T HAVE MONEY TO GET MORE.: NEVER TRUE

## 2023-03-12 SDOH — ECONOMIC STABILITY: HOUSING INSECURITY
IN THE LAST 12 MONTHS, WAS THERE A TIME WHEN YOU DID NOT HAVE A STEADY PLACE TO SLEEP OR SLEPT IN A SHELTER (INCLUDING NOW)?: NO

## 2023-03-12 SDOH — ECONOMIC STABILITY: TRANSPORTATION INSECURITY
IN THE PAST 12 MONTHS, HAS LACK OF TRANSPORTATION KEPT YOU FROM MEETINGS, WORK, OR FROM GETTING THINGS NEEDED FOR DAILY LIVING?: NO

## 2023-03-12 SDOH — ECONOMIC STABILITY: INCOME INSECURITY: HOW HARD IS IT FOR YOU TO PAY FOR THE VERY BASICS LIKE FOOD, HOUSING, MEDICAL CARE, AND HEATING?: NOT VERY HARD

## 2023-03-12 SDOH — ECONOMIC STABILITY: FOOD INSECURITY: WITHIN THE PAST 12 MONTHS, YOU WORRIED THAT YOUR FOOD WOULD RUN OUT BEFORE YOU GOT MONEY TO BUY MORE.: NEVER TRUE

## 2023-03-14 ENCOUNTER — OFFICE VISIT (OUTPATIENT)
Dept: FAMILY MEDICINE CLINIC | Age: 53
End: 2023-03-14
Payer: COMMERCIAL

## 2023-03-14 VITALS
SYSTOLIC BLOOD PRESSURE: 126 MMHG | HEART RATE: 86 BPM | TEMPERATURE: 97 F | HEIGHT: 65 IN | BODY MASS INDEX: 39.35 KG/M2 | OXYGEN SATURATION: 98 % | WEIGHT: 236.2 LBS | RESPIRATION RATE: 16 BRPM | DIASTOLIC BLOOD PRESSURE: 70 MMHG

## 2023-03-14 DIAGNOSIS — E66.9 CLASS 2 OBESITY WITHOUT SERIOUS COMORBIDITY WITH BODY MASS INDEX (BMI) OF 39.0 TO 39.9 IN ADULT, UNSPECIFIED OBESITY TYPE: ICD-10-CM

## 2023-03-14 DIAGNOSIS — E10.649 HYPOGLYCEMIA UNAWARENESS ASSOCIATED WITH TYPE 1 DIABETES MELLITUS (HCC): ICD-10-CM

## 2023-03-14 DIAGNOSIS — E10.9 TYPE 1 DIABETES MELLITUS WITHOUT COMPLICATION (HCC): Primary | ICD-10-CM

## 2023-03-14 DIAGNOSIS — R74.8 ALKALINE PHOSPHATASE RAISED: ICD-10-CM

## 2023-03-14 LAB
MICROALB/CREAT RATIO POC: ABNORMAL MG/G
MICROALBUMIN/CREAT UR-RTO: 10 MG/L
POC CREATININE: 50 MG/DL

## 2023-03-14 PROCEDURE — 2022F DILAT RTA XM EVC RTNOPTHY: CPT | Performed by: STUDENT IN AN ORGANIZED HEALTH CARE EDUCATION/TRAINING PROGRAM

## 2023-03-14 PROCEDURE — 82044 UR ALBUMIN SEMIQUANTITATIVE: CPT | Performed by: STUDENT IN AN ORGANIZED HEALTH CARE EDUCATION/TRAINING PROGRAM

## 2023-03-14 PROCEDURE — G8427 DOCREV CUR MEDS BY ELIG CLIN: HCPCS | Performed by: STUDENT IN AN ORGANIZED HEALTH CARE EDUCATION/TRAINING PROGRAM

## 2023-03-14 PROCEDURE — 3017F COLORECTAL CA SCREEN DOC REV: CPT | Performed by: STUDENT IN AN ORGANIZED HEALTH CARE EDUCATION/TRAINING PROGRAM

## 2023-03-14 PROCEDURE — 3046F HEMOGLOBIN A1C LEVEL >9.0%: CPT | Performed by: STUDENT IN AN ORGANIZED HEALTH CARE EDUCATION/TRAINING PROGRAM

## 2023-03-14 PROCEDURE — G8482 FLU IMMUNIZE ORDER/ADMIN: HCPCS | Performed by: STUDENT IN AN ORGANIZED HEALTH CARE EDUCATION/TRAINING PROGRAM

## 2023-03-14 PROCEDURE — 99213 OFFICE O/P EST LOW 20 MIN: CPT | Performed by: STUDENT IN AN ORGANIZED HEALTH CARE EDUCATION/TRAINING PROGRAM

## 2023-03-14 PROCEDURE — 1036F TOBACCO NON-USER: CPT | Performed by: STUDENT IN AN ORGANIZED HEALTH CARE EDUCATION/TRAINING PROGRAM

## 2023-03-14 PROCEDURE — G8417 CALC BMI ABV UP PARAM F/U: HCPCS | Performed by: STUDENT IN AN ORGANIZED HEALTH CARE EDUCATION/TRAINING PROGRAM

## 2023-03-14 ASSESSMENT — ENCOUNTER SYMPTOMS
NAUSEA: 0
VOMITING: 0
ABDOMINAL PAIN: 0
SHORTNESS OF BREATH: 0

## 2023-03-14 ASSESSMENT — PATIENT HEALTH QUESTIONNAIRE - PHQ9
1. LITTLE INTEREST OR PLEASURE IN DOING THINGS: 0
SUM OF ALL RESPONSES TO PHQ QUESTIONS 1-9: 0
SUM OF ALL RESPONSES TO PHQ9 QUESTIONS 1 & 2: 0
2. FEELING DOWN, DEPRESSED OR HOPELESS: 0

## 2023-03-14 NOTE — PATIENT INSTRUCTIONS
Thank you   Thank you for trusting us with your healthcare needs. You may receive a survey regarding today's visit. It would help us out if you would take a few moments to provide your feedback. We value your input. Please bring in ALL medications BOTTLES, including inhalers, herbal supplements, over the counter, prescribed & non-prescribed medicine. The office would like actual medication bottles and a list.   Please note our OFFICE POLICIES:   Prior to getting your labs drawn, please check with your insurance company for benefits and eligibility of lab services. Often, insurance companies cover certain tests for preventative visits only. It is patient's responsibility to see what is covered. We are unable to change a diagnosis after the test has been performed. Lab orders will not be re-printed. Please hold onto your original lab orders and take them to your lab to be completed. If you no show your scheduled appointment three times, you will be dismissed from this practice. Reschedules must be completed 24 hours prior to your schedule appointment. If the list below has been completed, PLEASE FAX RECORDS TO OUR OFFICE @ [phone].  Once the records have been received we will update your records at our office:  Health Maintenance Due   Topic Date Due    Hepatitis B vaccine (1 of 3 - Risk 3-dose series) Never done    Diabetic Alb to Cr ratio (uACR) test  04/27/2014

## 2023-03-14 NOTE — PROGRESS NOTES
[id number] [facility]. [address] [place], [place] [id number]  Dept: [phone]  Dept Fax: [phone]  Loc: [phone]      Rosendo Euceda is a 47 y.o. female who presents today for:  Chief Complaint   Patient presents with    3 Month Follow-Up           HPI:   Rosendo Euceda is 47 y.o. with a history of T1DM who presents today for 3 month f/u. T1DM: A1c 7.5 on 2/25. Has been using CGM to monitor blood sugars, but does still have test strips. Has noticed that stress seems to impact blood sugars, will jump up at work if very stressful. Sugars have averaged 160s. Still using lantus and humalog. She has continued to have less frequent episodes of hypoglycemia at night, actually noticed more highs at night with the CGM than lows. When does have night hypoglycemia episodes, had gotten down to 70s or 60s previously. CGM does have alarm set to alert at the 90s. Has not had symptomatic hypogylcemic episodes waking her up, but if is awake can notice vision seems to be slightly blurry. Needs guidance on what to do after exercise class. Following exercise class at night on Tuesdays, sugar will be lower prior But has increase in sugars after exercise class when gets home but does not want to use insulin because not eating much, not sure what to do for sugars at that time.      Objective:     Vitals:    03/14/23 0808   BP: 126/70   Pulse: 86   Resp: 16   Temp: 97 °F (36.1 °C)   SpO2: 98%         Wt Readings from Last 3 Encounters:   03/14/23 236 lb 3.2 oz (107.1 kg)   12/13/22 227 lb 6.4 oz (103.1 kg)   09/13/22 233 lb 6.4 oz (105.9 kg)       BP Readings from Last 3 Encounters:   03/14/23 126/70   12/13/22 124/72   09/13/22 110/60       Lab Results   Component Value Date    WBC 6.6 06/18/2022    HGB 12.2 06/18/2022    HCT 36.6 06/18/2022    MCV 86.1 06/18/2022     06/18/2022     Lab Results   Component Value Date     12/05/2022    K 4.5 12/05/2022    CL 98 12/05/2022    CO2 29 12/05/2022    BUN 12 12/05/2022    CREATININE 50 03/14/2023    GLUCOSE 251 12/05/2022    CALCIUM 8.9 12/05/2022    PROT 6.9 12/11/2021    LABALBU 3.9 12/05/2022    BILITOT 0.6 12/05/2022    ALKPHOS 186 12/05/2022    AST 20 12/05/2022    ALT 20 12/05/2022    LABGLOM 92 12/05/2022     Lab Results   Component Value Date    TSH 1.83 12/11/2021    T4FREE 0.83 12/11/2021     Lab Results   Component Value Date    LABA1C 7.7 12/05/2022     No results found for: EAG  Lab Results   Component Value Date    CHOL 16 12/05/2022    CHOL 152 12/11/2021    CHOL 170 09/21/2020     Lab Results   Component Value Date    TRIG 83 12/05/2022    TRIG 101 12/11/2021    TRIG 119 09/21/2020     Lab Results   Component Value Date    HDL 58 12/05/2022    HDL 52 12/11/2021    HDL 55 09/21/2020     Lab Results   Component Value Date    LDLCALC 85 12/05/2022    LDLCALC 80 12/11/2021    LDLCALC 91 09/21/2020     No results found for: PSA, PSADIA  No results found for: LJDFXMWL20  No results found for: VITD25       No results found for: LABMICR, [id number]    Review of Systems   Constitutional:  Negative for chills and fever. Respiratory:  Negative for shortness of breath. Cardiovascular:  Negative for chest pain. Gastrointestinal:  Negative for abdominal pain, nausea and vomiting. Endocrine: Positive for polydipsia and polyuria. Genitourinary:  Negative for hematuria. Skin:  Negative for rash. Neurological:  Negative for dizziness and light-headedness. Physical Exam  Constitutional:       Appearance: Normal appearance. HENT:      Head: Normocephalic and atraumatic. Right Ear: External ear normal.      Left Ear: External ear normal.      Mouth/Throat:      Mouth: Mucous membranes are moist.   Cardiovascular:      Rate and Rhythm: Normal rate and regular rhythm. Pulses: Normal pulses. Heart sounds: Murmur heard.    Pulmonary:      Effort: Pulmonary effort is normal. No respiratory distress. Breath sounds: Normal breath sounds. No wheezing. Abdominal:      General: Abdomen is flat. There is no distension. Palpations: Abdomen is soft. Tenderness: There is no abdominal tenderness. Musculoskeletal:         General: Normal range of motion. Skin:     General: Skin is warm. Capillary Refill: Capillary refill takes less than 2 seconds. Neurological:      General: No focal deficit present. Mental Status: She is alert and oriented to person, place, and time.        Immunization History   Administered Date(s) Administered    COVID-19, PFIZER Bivalent BOOSTER, DO NOT Dilute, (age 12y+), IM, 30 mcg/0.3 mL 09/13/2022    COVID-19, PFIZER GRAY top, DO NOT Dilute, (age 15 y+), IM, 30 mcg/0.3 mL 02/15/2022    COVID-19, PFIZER PURPLE top, DILUTE for use, (age 15 y+), 30mcg/0.3mL 03/23/2021, 04/13/2021, 02/15/2022    Influenza 12/01/2012, 11/29/2013    Influenza A ([id number]) Vaccine PF IM 10/26/2009    Influenza Vaccine, unspecified formulation 11/09/2015, 10/03/2016, 10/23/2017    Influenza Virus Vaccine 12/01/2012, 11/29/2013, 12/10/2014, 11/09/2015, 10/23/2017, 10/24/2018, 10/25/2019, 10/14/2020, 10/24/2021    Influenza Whole 10/24/2008    Influenza, AFLURIA (age 1 yrs+), FLUZONE, (age 10 mo+), MDV, 0.5mL 10/03/2016    Influenza, FLUARIX, FLULAVAL, FLUZONE (age 10 mo+) AND AFLURIA, (age 1 y+), PF, 0.5mL 10/23/2018, 10/25/2019, 10/14/2020, 10/24/2021    Influenza, FLUCELVAX, (age 10 mo+), MDCK, PF, 0.5mL 11/27/2022    Influenza, Triv, 3 Years and older, IM, PF (Afluria 5yrs and older) 10/23/2017    Pneumococcal Polysaccharide ([id number]) 08/03/2011    Td, unspecified formulation 01/01/2008    Tdap (Boostrix, Adacel) 06/19/2009, 09/13/2017    Zoster Recombinant (Shingrix) 10/14/2020, 12/09/2020       Health Maintenance Due   Topic Date Due    Hepatitis B vaccine (1 of 3 - Risk 3-dose series) Never done       Food Insecurity: No Food Insecurity    Worried About Running Out of Food in the Last Year: Never true    Ran Out of Food in the Last Year: Never true       Assessment / Plan:   1. Type 1 diabetes mellitus without complication (HCC)  - E5V 7.5 on most recent check, glycemic control improving  - Continue with CGM, has allowed for better monitoring and control and T1DM  - Continue Lantus and Humalog at current doses   - POCT microalbumin    2. Hypoglycemia unawareness associated with type 1 diabetes mellitus ([address]  - Episodes improving, continue to monitor     3. Class 2 obesity without serious comorbidity with body mass index (BMI) of 39.0 to 39.9 in adult, unspecified obesity type    4. Alkaline phosphatase raised  - Chronic, previously refused referral for further evaluation as stable. Continue to monitor           PAP/ PELVIC done per [facility]- last appt 2/15/2022- to follow up annually, recommended at this time   MAMMO done on 10/17/2022  COLONOSCOPY done 12/17/2019 per Dr. Danii Amaya- to do again in 12/2029. DILATED EYE EXAM- 10/5/2022 per pt - Dr. Emiliana Damon- stated beginning to develop cataracts - to do annually  Covid booster given 9/13/22, bivalent 4th dose          No follow-ups on file. Future Appointments   Date Time Provider Adela Grace   1/18/2024  8:00 AM Ariadna Vazquez MD [facility] - [address] [facility]       Patient given educational materials - see patient instructions. Discussed use, benefit, and sideeffects of prescribed medications. All patient questions answered. Pt voiced understanding. Reviewed health maintenance. Instructed to continue current medications, diet and exercise. Patient agreed with treatment plan. Follow up as directed.      Electronically signed by Valentin Severs, DO on 3/14/2023 at 8:30 AM

## 2023-03-14 NOTE — PROGRESS NOTES
S: 47 y.o. female with   Chief Complaint   Patient presents with    3 Month Follow-Up       Chief complaint, [place], and all pertinent details of the case reviewed with the resident. Please see resident's note for specific details discussed at today's visit. BP Readings from Last 3 Encounters:   03/14/23 126/70   12/13/22 124/72   09/13/22 110/60     Wt Readings from Last 3 Encounters:   03/14/23 236 lb 3.2 oz (107.1 kg)   12/13/22 227 lb 6.4 oz (103.1 kg)   09/13/22 233 lb 6.4 oz (105.9 kg)       O: VS:  height is 5' 5\" (1.651 m) and weight is 236 lb 3.2 oz (107.1 kg). Her temporal temperature is 97 °F (36.1 °C). Her blood pressure is 126/70 and her pulse is 86. Her respiration is 16 and oxygen saturation is 98%. Spot urine protein positive     Diagnosis Orders   1. Type 1 diabetes mellitus without complication (HCC)  POCT microalbumin      2. Hypoglycemia unawareness associated with type 1 diabetes mellitus (HCC)        3. Class 2 obesity without serious comorbidity with body mass index (BMI) of 37.0 to 37.9 in adult, unspecified obesity type        4. Alkaline phosphatase raised            Plan:  Make sure eating and taking insulin before exercise class  Continue with current CGM and insulin regimin as she is in reasonable control  Continue on lisinopril for kidney protectionin her DM  F/u in 3 month    Health Maintenance Due   Topic Date Due    Hepatitis B vaccine (1 of 3 - Risk 3-dose series) Never done       Attending Physician Statement  I have discussed the case, including pertinent history and exam findings with the resident. I agree with the documented assessment and plan as documented by the resident.         Malvin Resendez MD 3/14/2023 8:42 AM

## 2023-03-14 NOTE — PROGRESS NOTES
Health Maintenance Due   Topic Date Due    Hepatitis B vaccine (1 of 3 - Risk 3-dose series) Never done    Diabetic Alb to Cr ratio (uACR) test  04/27/2014

## 2023-03-26 DIAGNOSIS — E10.9 TYPE 1 DIABETES MELLITUS WITHOUT COMPLICATION (HCC): ICD-10-CM

## 2023-03-28 RX ORDER — LISINOPRIL 5 MG/1
5 TABLET ORAL DAILY
Qty: 90 TABLET | Refills: 3 | Status: SHIPPED | OUTPATIENT
Start: 2023-03-28

## 2023-03-28 NOTE — TELEPHONE ENCOUNTER
Patient's last appointment was : 3/14/2023  Patient's next appointment is :  6/20/2023  Last refilled: 3/22/2022 for 1 year   Pharmacy Verified    Lab Results   Component Value Date    LABA1C 7.7 12/05/2022     Lab Results   Component Value Date    CHOL 16 12/05/2022    TRIG 83 12/05/2022    HDL 58 12/05/2022    LDLCALC 85 12/05/2022    LDLDIRECT 79 09/10/2022     Lab Results   Component Value Date     12/05/2022    K 4.5 12/05/2022    CL 98 12/05/2022    CO2 29 12/05/2022    BUN 12 12/05/2022    CREATININE 50 03/14/2023    GLUCOSE 251 12/05/2022    CALCIUM 8.9 12/05/2022    PROT 6.9 12/11/2021    LABALBU 3.9 12/05/2022    BILITOT 0.6 12/05/2022    ALKPHOS 186 12/05/2022    AST 20 12/05/2022    ALT 20 12/05/2022    LABGLOM 92 12/05/2022     Lab Results   Component Value Date    TSH 1.83 12/11/2021    T4FREE 0.83 12/11/2021     Lab Results   Component Value Date    WBC 6.6 06/18/2022    HGB 12.2 06/18/2022    HCT 36.6 06/18/2022    MCV 86.1 06/18/2022     06/18/2022

## 2023-06-05 LAB
AVERAGE GLUCOSE: NORMAL
HBA1C MFR BLD: 7.5 %
HCT VFR BLD CALC: 37.5 % (ref 36–46)
HEMOGLOBIN: 12.7 G/DL (ref 12–16)
PLATELET # BLD: 281 K/ΜL
WBC # BLD: 6.6 10^3/ML

## 2023-06-20 ENCOUNTER — OFFICE VISIT (OUTPATIENT)
Dept: FAMILY MEDICINE CLINIC | Age: 53
End: 2023-06-20
Payer: COMMERCIAL

## 2023-06-20 VITALS
WEIGHT: 242.2 LBS | HEART RATE: 87 BPM | OXYGEN SATURATION: 97 % | DIASTOLIC BLOOD PRESSURE: 72 MMHG | HEIGHT: 65 IN | RESPIRATION RATE: 12 BRPM | SYSTOLIC BLOOD PRESSURE: 118 MMHG | BODY MASS INDEX: 40.35 KG/M2 | TEMPERATURE: 97.3 F

## 2023-06-20 DIAGNOSIS — E10.9 TYPE 1 DIABETES MELLITUS WITHOUT COMPLICATION (HCC): Primary | ICD-10-CM

## 2023-06-20 DIAGNOSIS — E66.9 CLASS 2 OBESITY WITHOUT SERIOUS COMORBIDITY WITH BODY MASS INDEX (BMI) OF 39.0 TO 39.9 IN ADULT, UNSPECIFIED OBESITY TYPE: ICD-10-CM

## 2023-06-20 DIAGNOSIS — E10.649 HYPOGLYCEMIA UNAWARENESS ASSOCIATED WITH TYPE 1 DIABETES MELLITUS (HCC): ICD-10-CM

## 2023-06-20 PROCEDURE — 3017F COLORECTAL CA SCREEN DOC REV: CPT | Performed by: STUDENT IN AN ORGANIZED HEALTH CARE EDUCATION/TRAINING PROGRAM

## 2023-06-20 PROCEDURE — 1036F TOBACCO NON-USER: CPT | Performed by: STUDENT IN AN ORGANIZED HEALTH CARE EDUCATION/TRAINING PROGRAM

## 2023-06-20 PROCEDURE — 3051F HG A1C>EQUAL 7.0%<8.0%: CPT | Performed by: STUDENT IN AN ORGANIZED HEALTH CARE EDUCATION/TRAINING PROGRAM

## 2023-06-20 PROCEDURE — G8427 DOCREV CUR MEDS BY ELIG CLIN: HCPCS | Performed by: STUDENT IN AN ORGANIZED HEALTH CARE EDUCATION/TRAINING PROGRAM

## 2023-06-20 PROCEDURE — 2022F DILAT RTA XM EVC RTNOPTHY: CPT | Performed by: STUDENT IN AN ORGANIZED HEALTH CARE EDUCATION/TRAINING PROGRAM

## 2023-06-20 PROCEDURE — G8417 CALC BMI ABV UP PARAM F/U: HCPCS | Performed by: STUDENT IN AN ORGANIZED HEALTH CARE EDUCATION/TRAINING PROGRAM

## 2023-06-20 PROCEDURE — 99213 OFFICE O/P EST LOW 20 MIN: CPT | Performed by: STUDENT IN AN ORGANIZED HEALTH CARE EDUCATION/TRAINING PROGRAM

## 2023-06-20 RX ORDER — INSULIN LISPRO 100 [IU]/ML
INJECTION, SOLUTION INTRAVENOUS; SUBCUTANEOUS
Qty: 5 ADJUSTABLE DOSE PRE-FILLED PEN SYRINGE | Refills: 5 | Status: SHIPPED | OUTPATIENT
Start: 2023-06-20

## 2023-06-20 RX ORDER — PEN NEEDLE, DIABETIC 31 GX5/16"
NEEDLE, DISPOSABLE MISCELLANEOUS
Qty: 200 EACH | Refills: 5 | Status: SHIPPED | OUTPATIENT
Start: 2023-06-20

## 2023-06-20 RX ORDER — BLOOD-GLUCOSE,RECEIVER,CONT
1 EACH MISCELLANEOUS DAILY
Qty: 1 EACH | Refills: 5 | Status: SHIPPED | OUTPATIENT
Start: 2023-06-20

## 2023-06-20 RX ORDER — PEN NEEDLE, DIABETIC 29 G X1/2"
NEEDLE, DISPOSABLE MISCELLANEOUS
Qty: 200 EACH | Refills: 3 | Status: CANCELLED | OUTPATIENT
Start: 2023-06-20

## 2023-06-20 RX ORDER — BLOOD-GLUCOSE SENSOR
1 EACH MISCELLANEOUS DAILY
Qty: 1 EACH | Refills: 5 | Status: SHIPPED | OUTPATIENT
Start: 2023-06-20

## 2023-06-20 RX ORDER — INSULIN GLARGINE 100 [IU]/ML
INJECTION, SOLUTION SUBCUTANEOUS
Qty: 40 ML | Refills: 3 | Status: CANCELLED | OUTPATIENT
Start: 2023-06-20

## 2023-06-20 RX ORDER — INSULIN GLARGINE 100 [IU]/ML
INJECTION, SOLUTION SUBCUTANEOUS
Qty: 5 ADJUSTABLE DOSE PRE-FILLED PEN SYRINGE | Refills: 5 | Status: SHIPPED | OUTPATIENT
Start: 2023-06-20

## 2023-06-20 ASSESSMENT — ENCOUNTER SYMPTOMS
BLOOD IN STOOL: 0
NAUSEA: 0
SHORTNESS OF BREATH: 0
VOMITING: 0
ABDOMINAL PAIN: 0

## 2023-06-20 NOTE — PROGRESS NOTES
S: 47 y.o. female with   Chief Complaint   Patient presents with    Follow-up Chronic Condition     Pt presents for 3 mo f/u for T1DM. Pt states she is doing okay and has been managing well. Chief complaint, [place], and all pertinent details of the case reviewed with the resident. Please see resident's note for specific details discussed at today's visit. BP Readings from Last 3 Encounters:   06/20/23 118/72   03/14/23 126/70   12/13/22 124/72     Wt Readings from Last 3 Encounters:   06/20/23 242 lb 3.2 oz (109.9 kg)   03/14/23 236 lb 3.2 oz (107.1 kg)   12/13/22 227 lb 6.4 oz (103.1 kg)       O: VS:  height is 5' 5\" (1.651 m) and weight is 242 lb 3.2 oz (109.9 kg). Her temperature is 97.3 °F (36.3 °C). Her blood pressure is 118/72 and her pulse is 87. Her respiration is 12 and oxygen saturation is 97%. A1c 7.5, at least 60% in range, some lows with exercise, varied highs  A:     Diagnosis Orders   1. Type 1 diabetes mellitus without complication (HCC)  Continuous Blood Gluc  (DEXCOM G7 ) MELINDA    Insulin Pen Needle (PEN NEEDLES 31GX5/16\") 31G X 8 MM MISC    Hemoglobin A1C    Continuous Blood Gluc Sensor (DEXCOM G7 SENSOR) MISC    insulin glargine (LANTUS SOLOSTAR) 100 UNIT/ML injection pen    insulin lispro, 1 Unit Dial, (HUMALOG KWIKPEN) 100 UNIT/ML SOPN      2. Hypoglycemia unawareness associated with type 1 diabetes mellitus (HCC)        3. Class 2 obesity without serious comorbidity with body mass index (BMI) of 39.0 to 39.9 in adult, unspecified obesity type        4.  Alkaline phosphatase raised            Plan:  Pt to make sure she eats before exercise and administered sliding scale as indicated  Recheck A1c in 3 months and connect her CGM with pharmacists here to optimize benefit of having Continuous glucose monitoring  Switch all  insulin to [facility]    Health Maintenance Due   Topic Date Due    Hepatitis B vaccine (1 of 3 - Risk 3-dose series) Never done       Attending Physician
Electronically signed by Jamie Scott DO on 6/20/2023 at 10:28 AM

## 2023-06-22 ENCOUNTER — TELEPHONE (OUTPATIENT)
Dept: FAMILY MEDICINE CLINIC | Age: 53
End: 2023-06-22

## 2023-07-01 ENCOUNTER — PATIENT MESSAGE (OUTPATIENT)
Dept: FAMILY MEDICINE CLINIC | Age: 53
End: 2023-07-01

## 2023-07-03 ENCOUNTER — TELEPHONE (OUTPATIENT)
Dept: FAMILY MEDICINE CLINIC | Age: 53
End: 2023-07-03

## 2023-07-06 ENCOUNTER — TELEPHONE (OUTPATIENT)
Dept: FAMILY MEDICINE CLINIC | Age: 53
End: 2023-07-06

## 2023-07-06 NOTE — TELEPHONE ENCOUNTER
I do not see any forms in my inbasket, I completed these forms yesterday and placed in the completed basket.     Dr. Terell Caruso

## 2023-07-06 NOTE — TELEPHONE ENCOUNTER
Scanned in media of denial of coverage for CGM reviewed. States requested additional information and I did complete that paperwork yesterday. Is this denial being received prior to that paperwork being completed or is this denial in response to that paperwork? Just need clarification regarding this. Thanks!     Dr. Jordyn Randall

## 2023-07-07 ENCOUNTER — HOSPITAL ENCOUNTER (EMERGENCY)
Age: 53
Discharge: HOME | End: 2023-07-07
Attending: EMERGENCY MEDICINE
Payer: COMMERCIAL

## 2023-07-07 VITALS
HEART RATE: 97 BPM | RESPIRATION RATE: 18 BRPM | DIASTOLIC BLOOD PRESSURE: 71 MMHG | TEMPERATURE: 98 F | WEIGHT: 225 LBS | BODY MASS INDEX: 37.49 KG/M2 | HEIGHT: 65 IN | OXYGEN SATURATION: 100 % | SYSTOLIC BLOOD PRESSURE: 137 MMHG

## 2023-07-07 DIAGNOSIS — E16.2 HYPOGLYCEMIA: ICD-10-CM

## 2023-07-07 DIAGNOSIS — R11.0 NAUSEA: Primary | ICD-10-CM

## 2023-07-07 LAB
ALBUMIN SERPL BCG-MCNC: 3.8 G/DL (ref 3.5–5.1)
ALP SERPL-CCNC: 178 U/L (ref 38–126)
ALT SERPL W/O P-5'-P-CCNC: 20 U/L (ref 11–66)
ANION GAP SERPL CALC-SCNC: 14 MEQ/L (ref 8–16)
AST SERPL-CCNC: 20 U/L (ref 5–40)
BASOPHILS ABSOLUTE: 0 THOU/MM3 (ref 0–0.1)
BASOPHILS NFR BLD AUTO: 0.2 %
BILIRUB CONJ SERPL-MCNC: < 0.2 MG/DL (ref 0–0.3)
BILIRUB SERPL-MCNC: 0.5 MG/DL (ref 0.3–1.2)
BILIRUB UR QL STRIP.AUTO: NEGATIVE
BUN SERPL-MCNC: 19 MG/DL (ref 7–22)
CALCIUM SERPL-MCNC: 8.8 MG/DL (ref 8.5–10.5)
CHARACTER UR: CLEAR
CHLORIDE SERPL-SCNC: 99 MEQ/L (ref 98–111)
CO2 SERPL-SCNC: 27 MEQ/L (ref 23–33)
COLOR: YELLOW
CREAT SERPL-MCNC: 0.7 MG/DL (ref 0.4–1.2)
DEPRECATED RDW RBC AUTO: 42.5 FL (ref 35–45)
EOSINOPHIL NFR BLD AUTO: 1.1 %
EOSINOPHILS ABSOLUTE: 0.1 THOU/MM3 (ref 0–0.4)
ERYTHROCYTE [DISTWIDTH] IN BLOOD BY AUTOMATED COUNT: 13.7 % (ref 11.5–14.5)
GFR SERPL CREATININE-BSD FRML MDRD: > 60 ML/MIN/1.73M2
GLUCOSE BLD STRIP.AUTO-MCNC: 100 MG/DL (ref 70–108)
GLUCOSE SERPL-MCNC: 157 MG/DL (ref 70–108)
GLUCOSE UR QL STRIP.AUTO: NEGATIVE MG/DL
HCT VFR BLD AUTO: 41 % (ref 37–47)
HGB BLD-MCNC: 13.5 GM/DL (ref 12–16)
HGB UR QL STRIP.AUTO: NEGATIVE
IMM GRANULOCYTES # BLD AUTO: 0.02 THOU/MM3 (ref 0–0.07)
IMM GRANULOCYTES NFR BLD AUTO: 0.2 %
KETONES UR QL STRIP.AUTO: 15
LIPASE SERPL-CCNC: 16.6 U/L (ref 5.6–51.3)
LYMPHOCYTES ABSOLUTE: 0.3 THOU/MM3 (ref 1–4.8)
LYMPHOCYTES NFR BLD AUTO: 3.2 %
MCH RBC QN AUTO: 28.5 PG (ref 26–33)
MCHC RBC AUTO-ENTMCNC: 32.9 GM/DL (ref 32.2–35.5)
MCV RBC AUTO: 86.5 FL (ref 81–99)
MONOCYTES ABSOLUTE: 0.2 THOU/MM3 (ref 0.4–1.3)
MONOCYTES NFR BLD AUTO: 2.8 %
NEUTROPHILS NFR BLD AUTO: 92.5 %
NITRITE UR QL STRIP: NEGATIVE
NRBC BLD AUTO-RTO: 0 /100 WBC
OSMOLALITY SERPL CALC.SUM OF ELEC: 284.9 MOSMOL/KG (ref 275–300)
PH UR STRIP.AUTO: 6.5 [PH] (ref 5–9)
PLATELET # BLD AUTO: 242 THOU/MM3 (ref 130–400)
PMV BLD AUTO: 9.8 FL (ref 9.4–12.4)
POTASSIUM SERPL-SCNC: 4.8 MEQ/L (ref 3.5–5.2)
PROT SERPL-MCNC: 6.6 G/DL (ref 6.1–8)
PROT UR STRIP.AUTO-MCNC: NEGATIVE MG/DL
RBC # BLD AUTO: 4.74 MILL/MM3 (ref 4.2–5.4)
SEGMENTED NEUTROPHILS ABSOLUTE COUNT: 7.6 THOU/MM3 (ref 1.8–7.7)
SODIUM SERPL-SCNC: 140 MEQ/L (ref 135–145)
SP GR UR REFRACT.AUTO: 1.02 (ref 1–1.03)
TROPONIN T: < 0.01 NG/ML
UROBILINOGEN, URINE: 1 EU/DL (ref 0–1)
WBC # BLD AUTO: 8.2 THOU/MM3 (ref 4.8–10.8)
WBC #/AREA URNS HPF: NEGATIVE /[HPF]

## 2023-07-07 PROCEDURE — 2580000003 HC RX 258: Performed by: STUDENT IN AN ORGANIZED HEALTH CARE EDUCATION/TRAINING PROGRAM

## 2023-07-07 PROCEDURE — 81003 URINALYSIS AUTO W/O SCOPE: CPT

## 2023-07-07 PROCEDURE — 93005 ELECTROCARDIOGRAM TRACING: CPT | Performed by: STUDENT IN AN ORGANIZED HEALTH CARE EDUCATION/TRAINING PROGRAM

## 2023-07-07 PROCEDURE — 85025 COMPLETE CBC W/AUTO DIFF WBC: CPT

## 2023-07-07 PROCEDURE — 83690 ASSAY OF LIPASE: CPT

## 2023-07-07 PROCEDURE — 82248 BILIRUBIN DIRECT: CPT

## 2023-07-07 PROCEDURE — 96361 HYDRATE IV INFUSION ADD-ON: CPT

## 2023-07-07 PROCEDURE — 96374 THER/PROPH/DIAG INJ IV PUSH: CPT

## 2023-07-07 PROCEDURE — 84484 ASSAY OF TROPONIN QUANT: CPT

## 2023-07-07 PROCEDURE — 80053 COMPREHEN METABOLIC PANEL: CPT

## 2023-07-07 PROCEDURE — 99284 EMERGENCY DEPT VISIT MOD MDM: CPT

## 2023-07-07 PROCEDURE — 6360000002 HC RX W HCPCS

## 2023-07-07 PROCEDURE — 82948 REAGENT STRIP/BLOOD GLUCOSE: CPT

## 2023-07-07 PROCEDURE — 36415 COLL VENOUS BLD VENIPUNCTURE: CPT

## 2023-07-07 RX ORDER — DEXTROSE AND SODIUM CHLORIDE 5; .9 G/100ML; G/100ML
INJECTION, SOLUTION INTRAVENOUS CONTINUOUS
Status: DISCONTINUED | OUTPATIENT
Start: 2023-07-07 | End: 2023-07-07 | Stop reason: HOSPADM

## 2023-07-07 RX ORDER — ONDANSETRON 2 MG/ML
4 INJECTION INTRAMUSCULAR; INTRAVENOUS ONCE
Status: COMPLETED | OUTPATIENT
Start: 2023-07-07 | End: 2023-07-07

## 2023-07-07 RX ORDER — ONDANSETRON 2 MG/ML
INJECTION INTRAMUSCULAR; INTRAVENOUS
Status: COMPLETED
Start: 2023-07-07 | End: 2023-07-07

## 2023-07-07 RX ADMIN — ONDANSETRON 4 MG: 2 INJECTION INTRAMUSCULAR; INTRAVENOUS at 20:18

## 2023-07-07 RX ADMIN — DEXTROSE AND SODIUM CHLORIDE: 5; 900 INJECTION, SOLUTION INTRAVENOUS at 19:51

## 2023-07-07 ASSESSMENT — PAIN - FUNCTIONAL ASSESSMENT
PAIN_FUNCTIONAL_ASSESSMENT: NONE - DENIES PAIN
PAIN_FUNCTIONAL_ASSESSMENT: NONE - DENIES PAIN

## 2023-07-07 NOTE — ED NOTES
Medicated per mar. Pt denies any needs, call light within reach.       Natalee Saunders RN  07/07/23 1952

## 2023-07-07 NOTE — ED TRIAGE NOTES
Patient presents to the ed with hypogylcemia. Pt states that she has had nausea all day and had sugars in the 60-80's. Upon arrival the ed her blood sugar was 100. Pt denies any other complaints.

## 2023-07-08 PROCEDURE — 93010 ELECTROCARDIOGRAM REPORT: CPT | Performed by: INTERNAL MEDICINE

## 2023-07-09 LAB
EKG ATRIAL RATE: 108 BPM
EKG P AXIS: 43 DEGREES
EKG P-R INTERVAL: 146 MS
EKG Q-T INTERVAL: 342 MS
EKG QRS DURATION: 78 MS
EKG QTC CALCULATION (BAZETT): 458 MS
EKG R AXIS: -6 DEGREES
EKG T AXIS: 43 DEGREES
EKG VENTRICULAR RATE: 108 BPM

## 2023-07-10 DIAGNOSIS — E10.9 TYPE 1 DIABETES MELLITUS WITHOUT COMPLICATION (HCC): ICD-10-CM

## 2023-07-10 RX ORDER — BLOOD-GLUCOSE SENSOR
EACH MISCELLANEOUS
Qty: 1 EACH | Refills: 5 | Status: SHIPPED | OUTPATIENT
Start: 2023-07-10

## 2023-07-10 NOTE — TELEPHONE ENCOUNTER
Patient's last appointment was : 2023  Patient's next appointment is :  2023  Last refilled: 2023  Yes Pharmacy Verified    Shalonda Prima requesting 2 Dexcom Sensors sent to [facility] on [facility] due to the one she currently has is  and is waiting on script from [facility]. Please Advise.      Lab Results   Component Value Date    LABA1C 7.5 2023     Lab Results   Component Value Date    CHOL 16 2022    TRIG 83 2022    HDL 58 2022    LDLCALC 85 2022    LDLDIRECT 79 09/10/2022     Lab Results   Component Value Date     2023    K 4.8 2023    CL 99 2023    CO2 27 2023    BUN 19 2023    CREATININE 0.7 2023    GLUCOSE 157 (H) 2023    CALCIUM 8.8 2023    PROT 6.6 2023    LABALBU 3.8 2023    BILITOT 0.5 2023    ALKPHOS 178 (H) 2023    AST 20 2023    ALT 20 2023    LABGLOM >60 2023     Lab Results   Component Value Date    TSH 1.83 2021    T4FREE 0.83 2021     Lab Results   Component Value Date    WBC 8.2 2023    HGB 13.5 2023    HCT 41.0 2023    MCV 86.5 2023     2023

## 2023-07-11 NOTE — TELEPHONE ENCOUNTER
Discussion noted. If we are able to get the paperwork again, I am happy to fill out ASAP. Please reach out to patient/insurance company to have them refax. Thanks.   ES

## 2023-07-13 ENCOUNTER — TELEPHONE (OUTPATIENT)
Dept: FAMILY MEDICINE CLINIC | Age: 53
End: 2023-07-13

## 2023-07-31 ENCOUNTER — TELEPHONE (OUTPATIENT)
Dept: FAMILY MEDICINE CLINIC | Age: 53
End: 2023-07-31

## 2023-08-01 NOTE — TELEPHONE ENCOUNTER
Form completed and signed, please fax and scan into chart. Placed in completed basket. Thanks!     Dr. Celso Elena

## 2023-08-31 ENCOUNTER — TELEPHONE (OUTPATIENT)
Dept: FAMILY MEDICINE CLINIC | Age: 53
End: 2023-08-31

## 2023-08-31 DIAGNOSIS — E78.5 HYPERLIPIDEMIA, UNSPECIFIED HYPERLIPIDEMIA TYPE: ICD-10-CM

## 2023-08-31 DIAGNOSIS — E10.9 TYPE 1 DIABETES MELLITUS WITHOUT COMPLICATION (HCC): Primary | ICD-10-CM

## 2023-08-31 DIAGNOSIS — E10.649 HYPOGLYCEMIA UNAWARENESS ASSOCIATED WITH TYPE 1 DIABETES MELLITUS (HCC): ICD-10-CM

## 2023-08-31 NOTE — TELEPHONE ENCOUNTER
Pt called requesting lab for be done prior to next appointment 9/21/23.  Pt would like lab orders to be faxed to [facility]. Fax: [phone]

## 2023-09-21 ENCOUNTER — OFFICE VISIT (OUTPATIENT)
Dept: FAMILY MEDICINE CLINIC | Age: 53
End: 2023-09-21

## 2023-09-21 VITALS
WEIGHT: 241.4 LBS | SYSTOLIC BLOOD PRESSURE: 118 MMHG | DIASTOLIC BLOOD PRESSURE: 64 MMHG | HEIGHT: 65 IN | OXYGEN SATURATION: 97 % | TEMPERATURE: 97.6 F | HEART RATE: 88 BPM | RESPIRATION RATE: 12 BRPM | BODY MASS INDEX: 40.22 KG/M2

## 2023-09-21 DIAGNOSIS — R74.8 ALKALINE PHOSPHATASE RAISED: ICD-10-CM

## 2023-09-21 DIAGNOSIS — E78.5 HYPERLIPIDEMIA, UNSPECIFIED HYPERLIPIDEMIA TYPE: ICD-10-CM

## 2023-09-21 DIAGNOSIS — E10.649 HYPOGLYCEMIA UNAWARENESS ASSOCIATED WITH TYPE 1 DIABETES MELLITUS (HCC): ICD-10-CM

## 2023-09-21 DIAGNOSIS — E10.9 TYPE 1 DIABETES MELLITUS WITHOUT COMPLICATION (HCC): Primary | ICD-10-CM

## 2023-09-21 DIAGNOSIS — E66.01 OBESITY, CLASS III, BMI 40-49.9 (MORBID OBESITY) (HCC): ICD-10-CM

## 2023-09-21 RX ORDER — INSULIN GLARGINE 100 [IU]/ML
INJECTION, SOLUTION SUBCUTANEOUS
Qty: 5 ADJUSTABLE DOSE PRE-FILLED PEN SYRINGE | Refills: 5 | Status: SHIPPED | OUTPATIENT
Start: 2023-09-21

## 2023-09-21 ASSESSMENT — ENCOUNTER SYMPTOMS
VOMITING: 0
ANAL BLEEDING: 0
BLOOD IN STOOL: 0
SHORTNESS OF BREATH: 0
NAUSEA: 0

## 2023-10-26 ENCOUNTER — HOSPITAL ENCOUNTER (OUTPATIENT)
Dept: WOMENS IMAGING | Age: 53
Discharge: HOME | End: 2023-10-26
Payer: COMMERCIAL

## 2023-10-26 VITALS — BODY MASS INDEX: 40.15 KG/M2 | WEIGHT: 241 LBS | HEIGHT: 65 IN

## 2023-10-26 DIAGNOSIS — Z12.31 VISIT FOR SCREENING MAMMOGRAM: ICD-10-CM

## 2023-10-26 PROCEDURE — 77063 BREAST TOMOSYNTHESIS BI: CPT

## 2023-12-26 DIAGNOSIS — E10.9 TYPE 1 DIABETES MELLITUS WITHOUT COMPLICATION (HCC): ICD-10-CM

## 2023-12-27 RX ORDER — ACYCLOVIR 400 MG/1
TABLET ORAL
Qty: 6 EACH | Refills: 3 | Status: SHIPPED | OUTPATIENT
Start: 2023-12-27

## 2023-12-27 RX ORDER — INSULIN LISPRO 100 [IU]/ML
INJECTION, SOLUTION INTRAVENOUS; SUBCUTANEOUS
Qty: 5 ADJUSTABLE DOSE PRE-FILLED PEN SYRINGE | Refills: 5 | Status: SHIPPED | OUTPATIENT
Start: 2023-12-27

## 2023-12-27 NOTE — TELEPHONE ENCOUNTER
Patient's last appointment was : 9/21/2023 with our Gaye Blount  Patient's next appointment is : 1/18/2024 with our Dr. Lalito Paz  Last refilled on: 06/20/2023 and 07/10/2023  Which pharmacy does the script need sent to: [facility]      Lab Results   Component Value Date    LABA1C 7.5 09/16/2023     Lab Results   Component Value Date    CHOL 16 12/05/2022    TRIG 83 12/05/2022    HDL 58 12/05/2022    LDLCALC 85 12/05/2022    LDLDIRECT 79 09/10/2022     Lab Results   Component Value Date     07/07/2023    K 4.8 07/07/2023    CL 99 07/07/2023    CO2 27 07/07/2023    BUN 19 07/07/2023    CREATININE 0.7 07/07/2023    GLUCOSE 157 (H) 07/07/2023    CALCIUM 8.8 07/07/2023    PROT 6.6 07/07/2023    LABALBU 3.8 07/07/2023    BILITOT 0.5 07/07/2023    ALKPHOS 178 (H) 07/07/2023    AST 20 07/07/2023    ALT 20 07/07/2023    LABGLOM >60 07/07/2023     Lab Results   Component Value Date    TSH 1.83 12/11/2021    T4FREE 0.83 12/11/2021     Lab Results   Component Value Date    WBC 6.9 09/16/2023    HGB 12.6 09/16/2023    HCT 37.9 09/16/2023    MCV 84.6 09/16/2023     09/16/2023

## 2023-12-28 ENCOUNTER — TELEPHONE (OUTPATIENT)
Dept: FAMILY MEDICINE CLINIC | Age: 53
End: 2023-12-28

## 2024-01-01 ENCOUNTER — PATIENT MESSAGE (OUTPATIENT)
Dept: FAMILY MEDICINE CLINIC | Age: 54
End: 2024-01-01

## 2024-01-01 DIAGNOSIS — E10.9 TYPE 1 DIABETES MELLITUS WITHOUT COMPLICATION (HCC): ICD-10-CM

## 2024-01-02 ENCOUNTER — PATIENT MESSAGE (OUTPATIENT)
Dept: FAMILY MEDICINE CLINIC | Age: 54
End: 2024-01-02

## 2024-01-02 DIAGNOSIS — E66.01 OBESITY, CLASS III, BMI 40-49.9 (MORBID OBESITY) (HCC): ICD-10-CM

## 2024-01-02 DIAGNOSIS — R74.8 ALKALINE PHOSPHATASE RAISED: ICD-10-CM

## 2024-01-02 DIAGNOSIS — E10.9 TYPE 1 DIABETES MELLITUS WITHOUT COMPLICATION (HCC): Primary | ICD-10-CM

## 2024-01-02 DIAGNOSIS — E78.5 HYPERLIPIDEMIA, UNSPECIFIED HYPERLIPIDEMIA TYPE: ICD-10-CM

## 2024-01-02 RX ORDER — INSULIN LISPRO 100 [IU]/ML
INJECTION, SOLUTION INTRAVENOUS; SUBCUTANEOUS
Qty: 45 ML | Refills: 3 | Status: SHIPPED | OUTPATIENT
Start: 2024-01-02

## 2024-01-02 RX ORDER — ACYCLOVIR 400 MG/1
1 TABLET ORAL DAILY
Qty: 1 EACH | Refills: 5 | Status: SHIPPED | OUTPATIENT
Start: 2024-01-02

## 2024-01-02 RX ORDER — ACYCLOVIR 400 MG/1
TABLET ORAL
Qty: 6 EACH | Refills: 3 | Status: SHIPPED | OUTPATIENT
Start: 2024-01-02

## 2024-01-02 NOTE — TELEPHONE ENCOUNTER
[facility]. They said that Dexcom sensors are recommended to be changed every 10 days per manufacture guidelines. Humalog they are asking to be sent in as a 90 day Rx., meds pended. Please correct pended meds before sending.

## 2024-01-02 NOTE — TELEPHONE ENCOUNTER
From: Zee Hill  To: Yobany Choi,   Sent: 1/1/2024 11:15 AM EST  Subject: New Scripts     You just sent 2 new scripts for me for an insulin and also my dexcom 7. I got an email from [facility] that they need you to reach out to them. They couldn't tell me why when I called. Please let me know when this is done so I can call and pay for the Scripts before they ship. Thank you & Happy New Year

## 2024-01-02 NOTE — TELEPHONE ENCOUNTER
New scripts ordered with correct CGM instructions and insulin for 90 day supply. Can use up to 50 units daily (0.5mL), so 45mL fill should last 90 days at maximum daily dosing.    Dr. Choi

## 2024-01-02 NOTE — TELEPHONE ENCOUNTER
From: Zee Hill  To: Yobany Choi DO  Sent: 1/2/2024 2:01 PM EST  Subject: Blood test orders    Can you please send the orders for my blood work to [facility] on [address]. I am planning on going in this weekend to get it done.

## 2024-01-03 NOTE — TELEPHONE ENCOUNTER
Labs reviewed.      Hemoglobin A1C   Date Value Ref Range Status   09/16/2023 7.5 % Final     Lab Results   Component Value Date    CHOL 16 12/05/2022    TRIG 83 12/05/2022    HDL 58 12/05/2022    LDLCALC 85 12/05/2022    LDLDIRECT 79 09/10/2022     The ASCVD Risk score (Woodrow PURVIS, et al., 2019) failed to calculate for the following reasons:    The valid total cholesterol range is 130 to 320 mg/dL  Lab Results   Component Value Date     07/07/2023    K 4.8 07/07/2023    CL 99 07/07/2023    CO2 27 07/07/2023    BUN 19 07/07/2023    CREATININE 0.7 07/07/2023    GLUCOSE 157 (H) 07/07/2023    CALCIUM 8.8 07/07/2023    PROT 6.6 07/07/2023    LABALBU 3.8 07/07/2023    BILITOT 0.5 07/07/2023    ALKPHOS 178 (H) 07/07/2023    AST 20 07/07/2023    ALT 20 07/07/2023    LABGLOM >60 07/07/2023     Lab Results   Component Value Date    MALBCR 30 - 300 (A) 03/14/2023      Lab Results   Component Value Date    TSH 1.83 12/11/2021    T4FREE 0.83 12/11/2021     Lab Results   Component Value Date    WBC 6.9 09/16/2023    HGB 12.6 09/16/2023    HCT 37.9 09/16/2023    MCV 84.6 09/16/2023     09/16/2023     Check HGA1C, FLP, LFT's, TSH w reflex FREE T4 at this time. Orders placed.  Please let pt know. ES

## 2024-01-06 NOTE — PROGRESS NOTES
[facility]  [address]  [facility] [address]  Dept: [phone]  Dept Fax: [phone]    SUBJECTIVE     Zee Hill is a 53 y.o.female    Pt presents for follow up of IDDM, Hyperlipidemia, elevated ALK PHOS, and Allergies    Pt feeling ok since last visit- interval history and any new issues noted below:     Glucometer readings at home are running \"a little higher recently\".  Using CGM.    The home BP readings have not been checked regularly.     Patient doing ok at this time-denies any new complaints.     Wt Readings from Last 3 Encounters:   01/18/24 110.9 kg (244 lb 9.6 oz)   10/26/23 109.3 kg (241 lb)   09/21/23 109.5 kg (241 lb 6.4 oz)   Weight increased 3# since last visit 3 months ago.     Patient Active Problem List   Diagnosis    Hyperlipidemia    Alkaline phosphatase raised    Insulin dependent diabetes mellitus    Allergic rhinitis due to animal hair and dander    Type 1 diabetes mellitus without complication (HCC)    Hypoglycemia unawareness associated with type 1 diabetes mellitus (HCC)       Current Outpatient Medications   Medication Sig Dispense Refill    Continuous Blood Gluc  (DEXCOM G7 ) MELINDA 1 Device by Does not apply route daily Apply one device every 10 days 1 each 5    insulin lispro, 1 Unit Dial, (HUMALOG KWIKPEN) 100 UNIT/ML SOPN Inject 7 units with breakfast and 10 units with lunch and dinner along with sliding scale. Max daily dose 50 units. 45 mL 3    Continuous Blood Gluc Sensor (DEXCOM G7 SENSOR) MISC Apply device every 10 days 6 each 3    insulin glargine (LANTUS SOLOSTAR) 100 UNIT/ML injection pen Inject 16 units subcutaneously in the morning and 16 units subcutaneously nightly 5 Adjustable Dose Pre-filled Pen Syringe 5    Insulin Pen Needle (PEN NEEDLES 31GX5/16\") 31G X 8 MM MISC Use with Humalog KwikPen Dx: E11.9 200 each 5    lisinopril (PRINIVIL;ZESTRIL) 5 MG tablet Take 1 tablet by mouth daily 90 tablet 3

## 2024-01-07 LAB
ALBUMIN SERPL-MCNC: 4.1 G/DL (ref 3.5–5.2)
ALK PHOSPHATASE: 206 U/L (ref 40–133)
ALT SERPL-CCNC: 16 U/L (ref 5–40)
AST SERPL-CCNC: 20 U/L (ref 9–40)
BILIRUB SERPL-MCNC: 0.5 MG/DL
BILIRUBIN DIRECT: 0.2 MG/DL (ref 0–0.3)
CHOLESTEROL/HDL RATIO: 2.8 RATIO
CHOLESTEROL: 157 MG/DL
HDLC SERPL-MCNC: 57 MG/DL
LDL CHOLESTEROL CALCULATED: 85 MG/DL
LDL/HDL RATIO: 1.5 RATIO
TOTAL PROTEIN: 6.4 G/DL (ref 6.1–8.3)
TRIGL SERPL-MCNC: 75 MG/DL
TSH SERPL DL<=0.05 MIU/L-ACNC: 2.67 UIU/ML (ref 0.4–4.1)
VLDLC SERPL CALC-MCNC: 15 MG/DL

## 2024-01-08 LAB
AVERAGE GLUCOSE: 160 MG/DL
HBA1C MFR BLD: 7.2 % (ref 4.2–5.6)

## 2024-01-11 DIAGNOSIS — E10.9 TYPE 1 DIABETES MELLITUS WITHOUT COMPLICATION (HCC): ICD-10-CM

## 2024-01-14 DIAGNOSIS — E78.5 HYPERLIPIDEMIA, UNSPECIFIED HYPERLIPIDEMIA TYPE: ICD-10-CM

## 2024-01-15 RX ORDER — ACYCLOVIR 400 MG/1
TABLET ORAL
Qty: 6 EACH | Refills: 3 | OUTPATIENT
Start: 2024-01-15

## 2024-01-18 ENCOUNTER — OFFICE VISIT (OUTPATIENT)
Dept: FAMILY MEDICINE CLINIC | Age: 54
End: 2024-01-18
Payer: COMMERCIAL

## 2024-01-18 ENCOUNTER — TELEPHONE (OUTPATIENT)
Dept: FAMILY MEDICINE CLINIC | Age: 54
End: 2024-01-18

## 2024-01-18 VITALS
WEIGHT: 244.6 LBS | SYSTOLIC BLOOD PRESSURE: 112 MMHG | RESPIRATION RATE: 12 BRPM | HEART RATE: 80 BPM | OXYGEN SATURATION: 97 % | TEMPERATURE: 97 F | BODY MASS INDEX: 40.75 KG/M2 | DIASTOLIC BLOOD PRESSURE: 62 MMHG | HEIGHT: 65 IN

## 2024-01-18 DIAGNOSIS — E78.5 HYPERLIPIDEMIA, UNSPECIFIED HYPERLIPIDEMIA TYPE: ICD-10-CM

## 2024-01-18 DIAGNOSIS — Z23 NEED FOR VACCINATION AGAINST STREPTOCOCCUS PNEUMONIAE: ICD-10-CM

## 2024-01-18 DIAGNOSIS — E66.01 OBESITY, CLASS III, BMI 40-49.9 (MORBID OBESITY) (HCC): ICD-10-CM

## 2024-01-18 DIAGNOSIS — E10.9 TYPE 1 DIABETES MELLITUS WITHOUT COMPLICATION (HCC): Primary | ICD-10-CM

## 2024-01-18 DIAGNOSIS — R74.8 ALKALINE PHOSPHATASE RAISED: ICD-10-CM

## 2024-01-18 DIAGNOSIS — J30.81 ALLERGIC RHINITIS DUE TO ANIMAL HAIR AND DANDER: ICD-10-CM

## 2024-01-18 PROCEDURE — 90471 IMMUNIZATION ADMIN: CPT | Performed by: FAMILY MEDICINE

## 2024-01-18 PROCEDURE — 3017F COLORECTAL CA SCREEN DOC REV: CPT | Performed by: FAMILY MEDICINE

## 2024-01-18 PROCEDURE — G8482 FLU IMMUNIZE ORDER/ADMIN: HCPCS | Performed by: FAMILY MEDICINE

## 2024-01-18 PROCEDURE — G8427 DOCREV CUR MEDS BY ELIG CLIN: HCPCS | Performed by: FAMILY MEDICINE

## 2024-01-18 PROCEDURE — 2022F DILAT RTA XM EVC RTNOPTHY: CPT | Performed by: FAMILY MEDICINE

## 2024-01-18 PROCEDURE — G8417 CALC BMI ABV UP PARAM F/U: HCPCS | Performed by: FAMILY MEDICINE

## 2024-01-18 PROCEDURE — 3051F HG A1C>EQUAL 7.0%<8.0%: CPT | Performed by: FAMILY MEDICINE

## 2024-01-18 PROCEDURE — 99214 OFFICE O/P EST MOD 30 MIN: CPT | Performed by: FAMILY MEDICINE

## 2024-01-18 PROCEDURE — 1036F TOBACCO NON-USER: CPT | Performed by: FAMILY MEDICINE

## 2024-01-18 PROCEDURE — 90677 PCV20 VACCINE IM: CPT | Performed by: FAMILY MEDICINE

## 2024-01-18 RX ORDER — ATORVASTATIN CALCIUM 40 MG/1
40 TABLET, FILM COATED ORAL DAILY
Qty: 90 TABLET | Refills: 3 | OUTPATIENT
Start: 2024-01-18

## 2024-01-18 ASSESSMENT — PATIENT HEALTH QUESTIONNAIRE - PHQ9
SUM OF ALL RESPONSES TO PHQ QUESTIONS 1-9: 0
1. LITTLE INTEREST OR PLEASURE IN DOING THINGS: 0
SUM OF ALL RESPONSES TO PHQ9 QUESTIONS 1 & 2: 0
SUM OF ALL RESPONSES TO PHQ QUESTIONS 1-9: 0
2. FEELING DOWN, DEPRESSED OR HOPELESS: 0

## 2024-01-18 ASSESSMENT — ENCOUNTER SYMPTOMS
CONSTIPATION: 0
BLOOD IN STOOL: 0
SHORTNESS OF BREATH: 0
ABDOMINAL PAIN: 0
CHEST TIGHTNESS: 0
VOMITING: 0
DIARRHEA: 0
NAUSEA: 0
ANAL BLEEDING: 0

## 2024-01-18 NOTE — PATIENT INSTRUCTIONS
Home BP's- call if > 140/90 on a regular basis.   Continue to work on diet, exercise, and weight loss for optimal cardiovascular health   After discussion with pt, will hold on referral back to Dr. Mcguire for elevated ALK PHOS as values stable for many years.  Check HGA1C, D-LDL, LFT's, SPOT MA  in 3 months  Check HGA1C and CMP in 6 months.   Continue current medicines otherwise.  No refills needed.  Follow up in 3 months (will update on HG A1c when completed in 3 months) with resident physician on Thursday afternoon.  Follow up in 6 months with resident physician on Thursday afternoon.  Follow up in 12 months with me.           Preventive Health Topics (updated 1/18/2024):  Encouraged COVID VACCINE booster- pt declines   PREVNAR 20 today.   PAP/ PELVIC done per [facility]- last appt 9-10/2023?- to follow up annually.  MAMMO due after 10/26/2024  COLONOSCOPY done 12/17/2019 per Dr. Mcguire- to do again in 12/2029.   DILATED EYE EXAM done 10-11/2023? per  Dr. Figueredo- all ok- to do annually.

## 2024-01-18 NOTE — PROGRESS NOTES
Immunizations Administered       Name Date Dose Route    Pneumococcal, PCV20, PREVNAR 20, (age 6w+), IM, 0.5mL 1/18/2024 0.5 mL Intramuscular    Site: Deltoid- Left    Lot: [id number]    NDC: 7248-0796-98         Patient tolerated well

## 2024-01-18 NOTE — TELEPHONE ENCOUNTER
Received Rx Clarification fax for patients Dexcom G7 Sensor. Placed in your mailbox for review. Once reviewed and signed place in runners mailbox to fax back.   Thank you.

## 2024-01-29 DIAGNOSIS — E78.5 HYPERLIPIDEMIA, UNSPECIFIED HYPERLIPIDEMIA TYPE: ICD-10-CM

## 2024-01-31 RX ORDER — ATORVASTATIN CALCIUM 40 MG/1
40 TABLET, FILM COATED ORAL DAILY
Qty: 90 TABLET | Refills: 3 | OUTPATIENT
Start: 2024-01-31

## 2024-02-03 ENCOUNTER — PATIENT MESSAGE (OUTPATIENT)
Dept: FAMILY MEDICINE CLINIC | Age: 54
End: 2024-02-03

## 2024-02-03 DIAGNOSIS — M25.569 ACUTE KNEE PAIN, UNSPECIFIED LATERALITY: Primary | ICD-10-CM

## 2024-02-05 NOTE — TELEPHONE ENCOUNTER
From: Zee Hill  To: Dr. Jerzy Duran  Sent: 2/3/2024 9:36 AM EST  Subject: Swelling    Question..who should I go see? I knelt down and my knee got a bit out of place, it is back in per my chiropractor. I knelt again 2 weeks later not smart of me. My knee doesn't hurt but the area behind it feels thick and my shin, ankle and foot are all swelling. Not sure who to go see. Suggestions

## 2024-02-07 ENCOUNTER — HOSPITAL ENCOUNTER (OUTPATIENT)
Dept: INTERVENTIONAL RADIOLOGY/VASCULAR | Age: 54
Discharge: HOME | End: 2024-02-09
Payer: COMMERCIAL

## 2024-02-07 ENCOUNTER — OFFICE VISIT (OUTPATIENT)
Dept: FAMILY MEDICINE CLINIC | Age: 54
End: 2024-02-07
Payer: COMMERCIAL

## 2024-02-07 VITALS
OXYGEN SATURATION: 96 % | BODY MASS INDEX: 41.05 KG/M2 | WEIGHT: 246.4 LBS | SYSTOLIC BLOOD PRESSURE: 130 MMHG | HEART RATE: 95 BPM | TEMPERATURE: 97.9 F | RESPIRATION RATE: 16 BRPM | DIASTOLIC BLOOD PRESSURE: 84 MMHG | HEIGHT: 65 IN

## 2024-02-07 DIAGNOSIS — M79.89 CALF SWELLING: Primary | ICD-10-CM

## 2024-02-07 DIAGNOSIS — M79.89 CALF SWELLING: ICD-10-CM

## 2024-02-07 DIAGNOSIS — M71.22 BAKER CYST, LEFT: ICD-10-CM

## 2024-02-07 PROCEDURE — 99214 OFFICE O/P EST MOD 30 MIN: CPT

## 2024-02-07 PROCEDURE — 93971 EXTREMITY STUDY: CPT

## 2024-02-07 NOTE — PROGRESS NOTES
S: 53 y.o. female with   Chief Complaint   Patient presents with    Knee Pain     Left knee swelling traveling down to ankle for about 1.5 weeks       Reviewed hx and ROS in detail with resident prior to exam.  See notes for additional details.     BP Readings from Last 3 Encounters:   02/07/24 130/84   01/18/24 112/62   09/21/23 118/64     Wt Readings from Last 3 Encounters:   02/07/24 111.8 kg (246 lb 6.4 oz)   01/18/24 110.9 kg (244 lb 9.6 oz)   10/26/23 109.3 kg (241 lb)           O: VS:  height is 1.651 m (5' 5\") and weight is 111.8 kg (246 lb 6.4 oz). Her oral temperature is 97.9 °F (36.6 °C). Her blood pressure is 130/84 and her pulse is 95. Her respiration is 16 and oxygen saturation is 96%.   AAO/NAD, appropriate affect for mood  Ext: unilateral ankle edema L side with mild posterior calf ttp    PROCEDURE: Limited ULTRASOUND - DVT scan (POCUS for Educational/teaching purposes)      Procedure: Limited ULTRASOUND - Lower extremity DVT Scan  Limited DVT US  Indication/Medical necessity: Left leg swelling  Consent was obtained from patient prior to bedside ultrasound    Using the 5.0MHz linear probe:        The transverse view of the left common femoral vein was evaluated at the level of the inguinal ligament and 10cm distally. At 1cm increments, the vein demonstrated :FULL COMPRESSION            The transverse view of the left popliteal vein was evaluated at the level of the popliteal fossa to its trifurcation for a total of 5cm along its length. At 1cm increments, the vein demonstrated : FULL COMPRESSION               Overall Findings/Impression: NO EVIDENCE OF DVT IN LEFT LEG    Other findings/comments: baker's cyst noted in popliteal fossa     Further Imaging:   Clinical evidence requires further imaging with high resolution US to confirm limited bedside study      Performed By: K. Leopold MD and ANATOLIY Pablo MD, PGY-3    Attending Physician Attestation: I was present and directly participated in the limited

## 2024-02-09 ASSESSMENT — ENCOUNTER SYMPTOMS
NAUSEA: 0
CONSTIPATION: 0
SINUS PAIN: 0
COUGH: 0
RHINORRHEA: 0
VOMITING: 0
PHOTOPHOBIA: 0
ABDOMINAL PAIN: 0
DIARRHEA: 0
SINUS PRESSURE: 0
SHORTNESS OF BREATH: 0
WHEEZING: 0
BACK PAIN: 0

## 2024-03-16 DIAGNOSIS — E10.9 TYPE 1 DIABETES MELLITUS WITHOUT COMPLICATION (HCC): ICD-10-CM

## 2024-03-18 RX ORDER — LISINOPRIL 5 MG/1
5 TABLET ORAL DAILY
Qty: 90 TABLET | Refills: 3 | Status: SHIPPED | OUTPATIENT
Start: 2024-03-18

## 2024-03-18 NOTE — TELEPHONE ENCOUNTER
Patient's last appointment was : 02/07/2024 with our   Patient's next appointment is : 04/18/2024 with our Dr. Choi  Last refilled on: 03/28/2023  Which pharmacy does the script need sent to: [facility] [address]      Lab Results   Component Value Date    LABA1C 7.2 (H) 01/06/2024     Lab Results   Component Value Date    CHOL 157 01/06/2024    TRIG 75 01/06/2024    HDL 57 01/06/2024    LDLCALC 85 01/06/2024    LDLDIRECT 79 09/10/2022     Lab Results   Component Value Date     07/07/2023    K 4.8 07/07/2023    CL 99 07/07/2023    CO2 27 07/07/2023    BUN 19 07/07/2023    CREATININE 0.7 07/07/2023    GLUCOSE 157 (H) 07/07/2023    CALCIUM 8.8 07/07/2023    PROT 6.4 01/06/2024    LABALBU 4.1 01/06/2024    BILITOT 0.5 01/06/2024    ALKPHOS 206 (H) 01/06/2024    AST 20 01/06/2024    ALT 16 01/06/2024    LABGLOM >60 07/07/2023     Lab Results   Component Value Date    TSH 2.67 01/06/2024    T4FREE 0.83 12/11/2021     Lab Results   Component Value Date    WBC 6.9 09/16/2023    HGB 12.6 09/16/2023    HCT 37.9 09/16/2023    MCV 84.6 09/16/2023     09/16/2023

## 2024-03-26 DIAGNOSIS — E78.5 HYPERLIPIDEMIA, UNSPECIFIED HYPERLIPIDEMIA TYPE: ICD-10-CM

## 2024-03-26 DIAGNOSIS — E10.9 TYPE 1 DIABETES MELLITUS WITHOUT COMPLICATION (HCC): ICD-10-CM

## 2024-03-26 NOTE — TELEPHONE ENCOUNTER
Received call from pt stating her Dexcom needs a prior authorization. She said this can be called into her insurance at [phone], under an urgent review. Other medications can be sent to [facility] on [address].

## 2024-03-27 RX ORDER — ACYCLOVIR 400 MG/1
TABLET ORAL
Qty: 9 EACH | Refills: 3 | Status: SHIPPED | OUTPATIENT
Start: 2024-03-27

## 2024-03-27 RX ORDER — INSULIN LISPRO 100 [IU]/ML
INJECTION, SOLUTION INTRAVENOUS; SUBCUTANEOUS
Qty: 15 ADJUSTABLE DOSE PRE-FILLED PEN SYRINGE | Refills: 3 | Status: SHIPPED | OUTPATIENT
Start: 2024-03-27

## 2024-03-27 RX ORDER — PEN NEEDLE, DIABETIC 31 GX5/16"
NEEDLE, DISPOSABLE MISCELLANEOUS
Qty: 500 EACH | Refills: 3 | Status: SHIPPED | OUTPATIENT
Start: 2024-03-27

## 2024-03-27 RX ORDER — ATORVASTATIN CALCIUM 40 MG/1
40 TABLET, FILM COATED ORAL DAILY
Qty: 90 TABLET | Refills: 3 | Status: SHIPPED | OUTPATIENT
Start: 2024-03-27

## 2024-03-27 RX ORDER — INSULIN GLARGINE 100 [IU]/ML
INJECTION, SOLUTION SUBCUTANEOUS
Qty: 10 ADJUSTABLE DOSE PRE-FILLED PEN SYRINGE | Refills: 3 | Status: SHIPPED | OUTPATIENT
Start: 2024-03-27

## 2024-03-27 NOTE — TELEPHONE ENCOUNTER
Patient's last appointment was : 6/29/2022 with our   Patient's next appointment is : 4/18/20024 with our Dr. Choi  Last refilled on: 1/19/2023 for 1 year  Which [facility]      Lab Results   Component Value Date    LABA1C 7.2 (H) 01/06/2024     Lab Results   Component Value Date    CHOL 157 01/06/2024    TRIG 75 01/06/2024    HDL 57 01/06/2024    LDLCALC 85 01/06/2024    LDLDIRECT 79 09/10/2022     Lab Results   Component Value Date     07/07/2023    K 4.8 07/07/2023    CL 99 07/07/2023    CO2 27 07/07/2023    BUN 19 07/07/2023    CREATININE 0.7 07/07/2023    GLUCOSE 157 (H) 07/07/2023    CALCIUM 8.8 07/07/2023    PROT 6.4 01/06/2024    LABALBU 4.1 01/06/2024    BILITOT 0.5 01/06/2024    ALKPHOS 206 (H) 01/06/2024    AST 20 01/06/2024    ALT 16 01/06/2024    LABGLOM >60 07/07/2023     Lab Results   Component Value Date    TSH 2.67 01/06/2024    T4FREE 0.83 12/11/2021     Lab Results   Component Value Date    WBC 6.9 09/16/2023    HGB 12.6 09/16/2023    HCT 37.9 09/16/2023    MCV 84.6 09/16/2023     09/16/2023

## 2024-03-27 NOTE — TELEPHONE ENCOUNTER
Some of these quantities seen elevated.  Please check with pt regarding desired amount for 3 month supply.  Let me know.  ES

## 2024-03-27 NOTE — TELEPHONE ENCOUNTER
Patient's last appointment was : 2/7/2024 with our   Patient's next appointment is : 4/18/2024 with our Dr. Choi  Last refilled on: 1//2024  Which pharmacy does the script need sent to: [facility]      Lab Results   Component Value Date    LABA1C 7.2 (H) 01/06/2024     Lab Results   Component Value Date    CHOL 157 01/06/2024    TRIG 75 01/06/2024    HDL 57 01/06/2024    LDLCALC 85 01/06/2024    LDLDIRECT 79 09/10/2022     Lab Results   Component Value Date     07/07/2023    K 4.8 07/07/2023    CL 99 07/07/2023    CO2 27 07/07/2023    BUN 19 07/07/2023    CREATININE 0.7 07/07/2023    GLUCOSE 157 (H) 07/07/2023    CALCIUM 8.8 07/07/2023    PROT 6.4 01/06/2024    LABALBU 4.1 01/06/2024    BILITOT 0.5 01/06/2024    ALKPHOS 206 (H) 01/06/2024    AST 20 01/06/2024    ALT 16 01/06/2024    LABGLOM >60 07/07/2023     Lab Results   Component Value Date    TSH 2.67 01/06/2024    T4FREE 0.83 12/11/2021     Lab Results   Component Value Date    WBC 6.9 09/16/2023    HGB 12.6 09/16/2023    HCT 37.9 09/16/2023    MCV 84.6 09/16/2023     09/16/2023

## 2024-04-14 LAB
ALBUMIN SERPL-MCNC: 4.3 G/DL (ref 3.5–5.2)
ALK PHOSPHATASE: 184 U/L (ref 40–133)
ALT SERPL-CCNC: 17 U/L (ref 5–40)
ANION GAP SERPL CALCULATED.3IONS-SCNC: 8 MEQ/L (ref 7–16)
AST SERPL-CCNC: 15 U/L (ref 9–40)
BILIRUB SERPL-MCNC: 0.6 MG/DL
BUN BLDV-MCNC: 14 MG/DL (ref 6–20)
CALCIUM SERPL-MCNC: 9.6 MG/DL (ref 8.5–10.5)
CHLORIDE BLD-SCNC: 98 MEQ/L (ref 95–107)
CO2: 32 MEQ/L (ref 19–31)
CREAT SERPL-MCNC: 0.72 MG/DL (ref 0.6–1.3)
EGFR IF NONAFRICAN AMERICAN: 99 ML/MIN/1.73
GLUCOSE: 347 MG/DL (ref 70–99)
POTASSIUM SERPL-SCNC: 5 MEQ/L (ref 3.5–5.4)
SODIUM BLD-SCNC: 138 MEQ/L (ref 133–146)
TOTAL PROTEIN: 6.9 G/DL (ref 6.1–8.3)

## 2024-04-15 LAB
ESTIMATED AVERAGE GLUCOSE: 151 MG/DL
HBA1C MFR BLD: 6.9 % (ref 4.2–5.6)

## 2024-04-17 SDOH — ECONOMIC STABILITY: FOOD INSECURITY: WITHIN THE PAST 12 MONTHS, THE FOOD YOU BOUGHT JUST DIDN'T LAST AND YOU DIDN'T HAVE MONEY TO GET MORE.: NEVER TRUE

## 2024-04-17 SDOH — ECONOMIC STABILITY: FOOD INSECURITY: WITHIN THE PAST 12 MONTHS, YOU WORRIED THAT YOUR FOOD WOULD RUN OUT BEFORE YOU GOT MONEY TO BUY MORE.: NEVER TRUE

## 2024-04-17 SDOH — ECONOMIC STABILITY: INCOME INSECURITY: HOW HARD IS IT FOR YOU TO PAY FOR THE VERY BASICS LIKE FOOD, HOUSING, MEDICAL CARE, AND HEATING?: NOT VERY HARD

## 2024-04-18 ENCOUNTER — OFFICE VISIT (OUTPATIENT)
Dept: FAMILY MEDICINE CLINIC | Age: 54
End: 2024-04-18
Payer: COMMERCIAL

## 2024-04-18 VITALS
TEMPERATURE: 98 F | SYSTOLIC BLOOD PRESSURE: 132 MMHG | HEIGHT: 65 IN | OXYGEN SATURATION: 97 % | DIASTOLIC BLOOD PRESSURE: 84 MMHG | HEART RATE: 91 BPM | WEIGHT: 247.2 LBS | RESPIRATION RATE: 16 BRPM | BODY MASS INDEX: 41.19 KG/M2

## 2024-04-18 DIAGNOSIS — E78.5 HYPERLIPIDEMIA, UNSPECIFIED HYPERLIPIDEMIA TYPE: ICD-10-CM

## 2024-04-18 DIAGNOSIS — E10.9 TYPE 1 DIABETES MELLITUS WITHOUT COMPLICATION (HCC): Primary | ICD-10-CM

## 2024-04-18 DIAGNOSIS — E10.649 HYPOGLYCEMIA UNAWARENESS ASSOCIATED WITH TYPE 1 DIABETES MELLITUS (HCC): ICD-10-CM

## 2024-04-18 DIAGNOSIS — R74.8 ALKALINE PHOSPHATASE RAISED: ICD-10-CM

## 2024-04-18 PROCEDURE — 99214 OFFICE O/P EST MOD 30 MIN: CPT | Performed by: STUDENT IN AN ORGANIZED HEALTH CARE EDUCATION/TRAINING PROGRAM

## 2024-04-18 PROCEDURE — 3044F HG A1C LEVEL LT 7.0%: CPT | Performed by: STUDENT IN AN ORGANIZED HEALTH CARE EDUCATION/TRAINING PROGRAM

## 2024-04-18 NOTE — PROGRESS NOTES
S: 53 y.o. female with   Chief Complaint   Patient presents with    3 Month Follow-Up     Pt has no new concerns or complaints.        Chief complaint, [place], and all pertinent details of the case reviewed with the resident.    Please see resident's note for specific details discussed at today's visit.    BP Readings from Last 3 Encounters:   04/18/24 132/84   02/07/24 130/84   01/18/24 112/62     Wt Readings from Last 3 Encounters:   04/18/24 112.1 kg (247 lb 3.2 oz)   02/07/24 111.8 kg (246 lb 6.4 oz)   01/18/24 110.9 kg (244 lb 9.6 oz)       O: VS:  height is 1.651 m (5' 5\") and weight is 112.1 kg (247 lb 3.2 oz). Her oral temperature is 98 °F (36.7 °C). Her blood pressure is 132/84 and her pulse is 91. Her respiration is 16 and oxygen saturation is 97%.   AAO/NAD, appropriate affect for mood  A1c- 6.9, last 2 weeks showed higher average  A: 1.DM bordelrine control, but difficult with some lows that she does not sense well at times  2.Gaining weight of 5 lbs since the fall  3. Knee improved     Diagnosis Orders   1. Type 1 diabetes mellitus without complication (HCC)  CBC with Auto Differential      2. Hypoglycemia unawareness associated with type 1 diabetes mellitus (HCC)        3. Alkaline phosphatase raised        4. Hyperlipidemia, unspecified hyperlipidemia type            Plan:  CBC, pt to send us next 2 weeks to data to see if has a time period which could be improved safely    Health Maintenance Due   Topic Date Due    Hepatitis B vaccine (1 of 3 - 3-dose series) Never done    Diabetic retinal exam  08/25/2022    Diabetic Alb to Cr ratio (uACR) test  03/14/2024       Attending Physician Statement  I have discussed the case, including pertinent history and exam findings with the resident. I also have seen the patient and performed key portions of the examination.  I agree with the documented assessment and plan as documented by the resident.        Hina Coronado MD 4/18/2024 8:57 AM

## 2024-04-18 NOTE — PROGRESS NOTES
[facility]  [address]  [facility] [address]  Dept: [phone]  Dept Fax: [phone]  Loc: [phone]      Zee Hill is a 53 y.o. female who presents today for:  Chief Complaint   Patient presents with    3 Month Follow-Up     Pt has no new concerns or complaints.        HPI:   Zee Hill is 53 y.o. presents today for follow up.    Currently wearing a brace on left knee, swelling has improved. Baker's cyst of left leg, pain has improved overall. Wearing the brace out of work as sometimes feels like knee might give or pop out of place, chiropractor has fixed in the past. This morning applied voltaren gel, is also elevating left leg at night and swelling has improved.     T1DM: A1c 6.9 on 4/13, improving. Sugars ranging on meter wide, this morning upon awakening 160s. Can still have episodes where sugars get hypoglycemic range and not always having symptoms. 253 currently. Humalog is currently 7, 10 and 10 plus SS. Lantus is 16 BID.     Weight: Doing more walking at work, seems to help lose weight in the past so optimistric regarding this.    Chronically elevated alk phos, previously discussed further evaluation with PCP and declined.       The 10-year ASCVD risk score (Woodrow PURVIS, et al., 2019) is: 2.7%    Values used to calculate the score:      Age: 53 years      Sex: Female      Is Non- : No      Diabetic: Yes      Tobacco smoker: No      Systolic Blood Pressure: 132 mmHg      Is BP treated: No      HDL Cholesterol: 57 mg/dL      Total Cholesterol: 157 mg/dL        Objective:     Vitals:    04/18/24 0806   BP: 132/84   Pulse: 91   Resp: 16   Temp: 98 °F (36.7 °C)   SpO2: 97%         Wt Readings from Last 3 Encounters:   04/18/24 112.1 kg (247 lb 3.2 oz)   02/07/24 111.8 kg (246 lb 6.4 oz)   01/18/24 110.9 kg (244 lb 9.6 oz)       BP Readings from Last 3 Encounters:   04/18/24 132/84

## 2024-04-30 ENCOUNTER — TELEPHONE (OUTPATIENT)
Dept: FAMILY MEDICINE CLINIC | Age: 54
End: 2024-04-30

## 2024-04-30 NOTE — TELEPHONE ENCOUNTER
Opened encounter and associated lab order with additional diagnosis that should be covered by insurance. Did want checked for diabetes to rule out anemia causing a falsely decreased Hemoglobin A1c.    Dr. Choi

## 2024-04-30 NOTE — TELEPHONE ENCOUNTER
Fax from [facility] acting for a valid diag code to bill insurance for labs drawn on 04/22/2024 . Form states either call their number at [phone] or secure fax them at [phone] with the correct diagnostic code .  Forms are scanned in, attached, and placed in PCP mailbox.

## 2024-06-23 LAB
ALBUMIN: 4 G/DL (ref 3.5–5.2)
ALK PHOSPHATASE: 190 U/L (ref 40–133)
ALT SERPL-CCNC: 18 U/L (ref 5–40)
AST SERPL-CCNC: 20 U/L (ref 9–40)
BILIRUB SERPL-MCNC: 0.4 MG/DL
BILIRUBIN DIRECT: <0.2 MG/DL (ref 0–0.3)
CREATINE, URINE: 20.3 MG/DL
LDL CHOLESTEROL DIRECT: 85 MG/DL
MICROALBUMIN/CREAT 24H UR: <1.2 MG/DL
MICROALBUMIN/CREAT UR-RTO: NORMAL MG/G
TOTAL PROTEIN: 6.6 G/DL (ref 6.1–8.3)

## 2024-06-24 LAB
ESTIMATED AVERAGE GLUCOSE: 163 MG/DL
HBA1C MFR BLD: 7.3 % (ref 4.2–5.6)

## 2024-06-25 ENCOUNTER — TELEPHONE (OUTPATIENT)
Dept: FAMILY MEDICINE CLINIC | Age: 54
End: 2024-06-25

## 2024-06-25 NOTE — TELEPHONE ENCOUNTER
----- Message from Jerzy Duran MD sent at 6/24/2024 11:41 AM EDT -----  Notify pt-   Results reviewed.   HG A1c mildly elevated at 7.3-continue to watch sugars closely  LFTs okay, except alk phos elevated at 190 (stable from previous checks) will continue periodic surveillance  D-LDL above goal at 85-continue high-dose Lipitor  Spot MA negative  Follow-up as scheduled on July 18 with Dr. Boo.  ES

## 2024-07-18 ENCOUNTER — OFFICE VISIT (OUTPATIENT)
Dept: FAMILY MEDICINE CLINIC | Age: 54
End: 2024-07-18
Payer: COMMERCIAL

## 2024-07-18 VITALS
WEIGHT: 244.4 LBS | TEMPERATURE: 98.1 F | HEART RATE: 101 BPM | RESPIRATION RATE: 12 BRPM | SYSTOLIC BLOOD PRESSURE: 126 MMHG | OXYGEN SATURATION: 96 % | BODY MASS INDEX: 40.72 KG/M2 | HEIGHT: 65 IN | DIASTOLIC BLOOD PRESSURE: 80 MMHG

## 2024-07-18 DIAGNOSIS — E10.9 TYPE 1 DIABETES MELLITUS WITHOUT COMPLICATION (HCC): Primary | ICD-10-CM

## 2024-07-18 DIAGNOSIS — R74.8 ALKALINE PHOSPHATASE RAISED: ICD-10-CM

## 2024-07-18 DIAGNOSIS — Z12.31 SCREENING MAMMOGRAM FOR BREAST CANCER: ICD-10-CM

## 2024-07-18 PROCEDURE — 3051F HG A1C>EQUAL 7.0%<8.0%: CPT

## 2024-07-18 PROCEDURE — 99214 OFFICE O/P EST MOD 30 MIN: CPT

## 2024-07-18 NOTE — PROGRESS NOTES
Health Maintenance Due   Topic Date Due    Hepatitis B vaccine (1 of 3 - 3-dose series) Never done    Diabetic retinal exam  08/25/2022       
  Medication Instructions    Accu-Chek Multiclix Lancets MISC Use to test blood sugar seven-eight times daily and as needed. Dispense lancets to match patient's meter. Dx: E10.9    aspirin 81 mg, Oral, DAILY    atorvastatin (LIPITOR) 40 mg, Oral, DAILY    blood glucose monitor kit and supplies Accu Chek blood glucose monitor, use daily as instructed, DX: E10.9    blood glucose monitor strips Accuchek testing strips. Test 7-8 times daily and prn. Dx: E10.9    coenzyme Q10 200 mg, Oral, DAILY    COLLAGEN PO Oral, DAILY    Continuous Blood Gluc  (DEXCOM G7 ) MELINDA 1 Device, Does not apply, DAILY, Apply one device every 10 days    Continuous Blood Gluc Sensor (DEXCOM G7 SENSOR) MISC Apply device every 10 days    Elastic Bandages & Supports (MEDICAL COMPRESSION STOCKINGS) MISC 1 each, Does not apply, PRN, 15-20mmhg, calf high is okay.    insulin glargine (LANTUS SOLOSTAR) 100 UNIT/ML injection pen Inject 16 units subcutaneously in the morning and 16 units subcutaneously nightly    insulin lispro, 1 Unit Dial, (HUMALOG KWIKPEN) 100 UNIT/ML SOPN Inject 7 units with breakfast and 10 units with lunch and dinner along with sliding scale. Max daily dose 50 units.    Insulin Pen Needle (PEN NEEDLES 31GX5/16\") 31G X 8 MM MISC Use with Humalog KwikPen. Fives times daily Dx: E11.9    Insulin Syringe-Needle U-100 (BD INSULIN SYRINGE U/F) 30G X 1/2\" 0.5 ML MISC use with lantus twice daily    lisinopril (PRINIVIL;ZESTRIL) 5 mg, Oral, DAILY    loratadine (CLARITIN) 10 mg, Oral, NIGHTLY    Multiple Vitamins-Minerals (THERAPEUTIC MULTIVITAMIN-MINERALS) tablet 1 tablet, Oral, DAILY    Probiotic Product (PROBIOTIC PO) Oral, DAILY    vitamin C (ASCORBIC ACID) 500 mg, Oral, 2 TIMES DAILY,           Assessment & Plan:      1. Type 1 diabetes mellitus without complication (HCC)  -     Hemoglobin A1C; Future  -     CBC with Auto Differential; Future  2. Alkaline phosphatase raised  -     Gamma GT; Future  3. Screening mammogram 
discharge.         Left eye: No discharge.      Extraocular Movements: Extraocular movements intact.      Conjunctiva/sclera: Conjunctivae normal.      Pupils: Pupils are equal, round, and reactive to light.   Neck:      Vascular: No carotid bruit.   Cardiovascular:      Rate and Rhythm: Normal rate and regular rhythm.      Heart sounds: Murmur (1-2/6 MATT @ LUSB= RUSB) heard.      No friction rub. No gallop.   Pulmonary:      Effort: Pulmonary effort is normal. No respiratory distress.      Breath sounds: Normal breath sounds. No stridor. No wheezing, rhonchi or rales.   Abdominal:      General: Abdomen is flat. Bowel sounds are normal. There is no distension.      Palpations: Abdomen is soft. There is no mass.      Tenderness: There is no abdominal tenderness. There is no guarding or rebound.      Hernia: No hernia is present.   Musculoskeletal:         General: No swelling or signs of injury. Normal range of motion.      Cervical back: Normal range of motion.      Right lower leg: No edema.      Left lower leg: No edema.   Skin:     General: Skin is warm and dry.      Coloration: Skin is not jaundiced or pale.      Findings: No bruising, erythema, lesion or rash.   Neurological:      General: No focal deficit present.      Mental Status: She is alert and oriented to person, place, and time.   Psychiatric:         Mood and Affect: Mood normal.         Behavior: Behavior normal.         Thought Content: Thought content normal.         Judgment: Judgment normal.          No results found for this visit on 07/18/24.    Lab Results   Component Value Date    LABA1C 7.3 (H) 06/22/2024       Lab Results   Component Value Date    CHOL 157 01/06/2024    TRIG 75 01/06/2024    HDL 57 01/06/2024    LDLDIRECT 85 06/22/2024       The 10-year ASCVD risk score (Woodrow PURVIS, et al., 2019) is: 2.5%    Values used to calculate the score:      Age: 53 years      Sex: Female      Is Non- : No      Diabetic: Yes

## 2024-07-18 NOTE — PATIENT INSTRUCTIONS
Thank you   Thank you for trusting us with your healthcare needs. You may receive a survey regarding today's visit. It would help us out if you would take a few moments to provide your feedback. We value your input.  Please bring in ALL medications BOTTLES, including inhalers, herbal supplements, over the counter, prescribed & non-prescribed medicine. The office would like actual medication bottles and a list.         4.  Prior to getting your labs drawn, check with your insurance company for benefits and eligibility of lab services.  Often, insurance companies cover certain tests for preventative visits only.  It is patient's responsibility to    see what is covered.    5.  If the list below has been completed, PLEASE FAX RECORDS TO OUR OFFICE @ [phone]. Once the records have been received we will update your records at our office:  Health Maintenance Due   Topic Date Due    Hepatitis B vaccine (1 of 3 - 3-dose series) Never done    Diabetic retinal exam  08/25/2022

## 2024-07-22 ASSESSMENT — ENCOUNTER SYMPTOMS
ABDOMINAL PAIN: 0
NAUSEA: 0
CHEST TIGHTNESS: 0
VOMITING: 0
CONSTIPATION: 0
DIARRHEA: 0
SHORTNESS OF BREATH: 0

## 2024-10-13 LAB
BASOPHILS ABSOLUTE: 0.04 /ΜL
BASOPHILS RELATIVE PERCENT: 0.6 %
EOSINOPHILS ABSOLUTE: 0.25 /ΜL
EOSINOPHILS RELATIVE PERCENT: 4 %
ESTIMATED AVERAGE GLUCOSE: 148
HBA1C MFR BLD: 6.8 %
HCT VFR BLD CALC: 39.8 % (ref 36–46)
HEMOGLOBIN: 13.5 G/DL (ref 12–16)
LYMPHOCYTES ABSOLUTE: 1.22 /ΜL
LYMPHOCYTES RELATIVE PERCENT: 19.6 %
MCH RBC QN AUTO: 29.2 PG
MCHC RBC AUTO-ENTMCNC: 33.9 G/DL
MCV RBC AUTO: 86 FL
MONOCYTES ABSOLUTE: 0.36 /ΜL
MONOCYTES RELATIVE PERCENT: 5.8 %
NEUTROPHILS ABSOLUTE: 4.33 /ΜL
NEUTROPHILS RELATIVE PERCENT: NORMAL
PDW BLD-RTO: 13.9 %
PLATELET # BLD: 282 K/ΜL
PMV BLD AUTO: NORMAL FL
RBC # BLD: 4.63 10^6/ΜL
WBC # BLD: 6.2 10^3/ML

## 2024-10-21 ENCOUNTER — OFFICE VISIT (OUTPATIENT)
Dept: FAMILY MEDICINE CLINIC | Age: 54
End: 2024-10-21

## 2024-10-21 VITALS
DIASTOLIC BLOOD PRESSURE: 70 MMHG | TEMPERATURE: 98.2 F | BODY MASS INDEX: 40.08 KG/M2 | HEART RATE: 90 BPM | WEIGHT: 240.6 LBS | HEIGHT: 65 IN | OXYGEN SATURATION: 97 % | RESPIRATION RATE: 16 BRPM | SYSTOLIC BLOOD PRESSURE: 126 MMHG

## 2024-10-21 DIAGNOSIS — N95.0 ABNORMAL VAGINAL BLEEDING IN POSTMENOPAUSAL PATIENT: ICD-10-CM

## 2024-10-21 DIAGNOSIS — Z23 NEED FOR VACCINATION: ICD-10-CM

## 2024-10-21 DIAGNOSIS — E66.01 OBESITY, CLASS III, BMI 40-49.9 (MORBID OBESITY): ICD-10-CM

## 2024-10-21 DIAGNOSIS — E78.5 HYPERLIPIDEMIA, UNSPECIFIED HYPERLIPIDEMIA TYPE: ICD-10-CM

## 2024-10-21 DIAGNOSIS — E10.9 TYPE 1 DIABETES MELLITUS WITHOUT COMPLICATION (HCC): Primary | ICD-10-CM

## 2024-10-21 ASSESSMENT — ENCOUNTER SYMPTOMS
ABDOMINAL PAIN: 0
CONSTIPATION: 0
NAUSEA: 0
DIARRHEA: 0
VOMITING: 0
SHORTNESS OF BREATH: 0
CHEST TIGHTNESS: 0

## 2024-10-21 NOTE — PATIENT INSTRUCTIONS
Thank you   Thank you for trusting us with your healthcare needs. You may receive a survey regarding today's visit. It would help us out if you would take a few moments to provide your feedback. We value your input.  Please bring in ALL medications BOTTLES, including inhalers, herbal supplements, over the counter, prescribed & non-prescribed medicine. The office would like actual medication bottles and a list.         4.  Prior to getting your labs drawn, check with your insurance company for benefits and eligibility of lab services.  Often, insurance companies cover certain tests for preventative visits only.  It is patient's responsibility to    see what is covered.    5.  If the list below has been completed, PLEASE FAX RECORDS TO OUR OFFICE @ [phone]. Once the records have been received we will update your records at our office:  Health Maintenance Due   Topic Date Due    Hepatitis B vaccine (1 of 3 - 19+ 3-dose series) Never done    Diabetic retinal exam  08/25/2022    Flu vaccine (1) 08/01/2024    COVID-19 Vaccine (6 - 2023-24 season) 09/01/2024    Diabetic foot exam  09/21/2024    Breast cancer screen  10/26/2024

## 2024-10-21 NOTE — PROGRESS NOTES
Health Maintenance Due   Topic Date Due    Hepatitis B vaccine (1 of 3 - 19+ 3-dose series) Never done    Diabetic retinal exam  08/25/2022    Flu vaccine (1) 08/01/2024    COVID-19 Vaccine (6 - 2023-24 season) 09/01/2024    Diabetic foot exam  09/21/2024    Breast cancer screen  10/26/2024       
Immunizations Administered       Name Date Dose Route    Influenza, FLUCELVAX, (age 6 mo+) IM, Trivalent PF, 0.5mL 10/21/2024 0.5 mL Intramuscular    Site: Deltoid- Left    Lot: [id number]    NDC: 26751-712-23         Patient tolerated well  
S: 54 y.o. female with   Chief Complaint   Patient presents with    3 Month Follow-Up     Chronic Conditions         HPI: please see resident note for HPI and ROS.    BP Readings from Last 3 Encounters:   10/21/24 126/70   07/18/24 126/80   04/18/24 132/84     Wt Readings from Last 3 Encounters:   10/21/24 109.1 kg (240 lb 9.6 oz)   07/18/24 110.9 kg (244 lb 6.4 oz)   04/18/24 112.1 kg (247 lb 3.2 oz)       O: VS:  height is 1.651 m (5' 5\") and weight is 109.1 kg (240 lb 9.6 oz). Her oral temperature is 98.2 °F (36.8 °C). Her blood pressure is 126/70 and her pulse is 90. Her respiration is 16 and oxygen saturation is 97%.   AAO/NAD, appropriate affect for mood  CV:  RRR, no murmur  Resp: CTAB     Diagnosis Orders   1. Type 1 diabetes mellitus without complication (HCC)  Comprehensive Metabolic Panel, Fasting    Hemoglobin A1C      2. Hyperlipidemia, unspecified hyperlipidemia type        3. Obesity, Class III, BMI 40-49.9 (morbid obesity)        4. Abnormal vaginal bleeding in postmenopausal patient  US NON OB TRANSVAGINAL      5. Need for vaccination  Influenza, FLUCELVAX Trivalent, (age 6 mo+) IM, Preservative Free, 0.5mL          Plan:  Please refer to resident note for full plan.    54 year old female presents to the office for follow up.    Type 1 diabetes: chronic controlled. A1c 6.8. currently on lantus 16 units BID and lispro 7 units am 10 units bid with meals and sliding scale.   -continue ace inhibitor lisinopril 5 mg    Hyperlipidemia: chronic stable. Continue lipitor.    Post menopausal vaginal bleeding. Plan for transvaginal ultrasound to determine need for endometrial biopsy.       Health Maintenance Due   Topic Date Due    Hepatitis B vaccine (1 of 3 - 19+ 3-dose series) Never done    Diabetic retinal exam  08/25/2022    COVID-19 Vaccine (6 - 2023-24 season) 09/01/2024    Diabetic foot exam  09/21/2024    Breast cancer screen  10/26/2024       Attending Physician Statement  I have discussed the case, 
No gallop.   Pulmonary:      Effort: Pulmonary effort is normal. No respiratory distress.      Breath sounds: Normal breath sounds. No stridor. No wheezing, rhonchi or rales.   Abdominal:      General: Abdomen is flat. Bowel sounds are normal.      Palpations: Abdomen is soft.   Neurological:      Mental Status: She is alert.       Most Recent Data:  Lab Results   Component Value Date    WBC 6.2 10/13/2024    HGB 13.5 10/13/2024    HCT 39.8 10/13/2024     10/13/2024    CHOL 157 01/06/2024    TRIG 75 01/06/2024    HDL 57 01/06/2024    LDLDIRECT 85 06/22/2024    ALT 18 06/22/2024    AST 20 06/22/2024     04/13/2024    CL 98 04/13/2024    K 5.0 04/13/2024    CREATININE 0.72 04/13/2024    BUN 14 04/13/2024    CO2 32 (H) 04/13/2024    TSH 2.67 01/06/2024    LABA1C 6.8 10/13/2024     Vascular duplex lower extremity venous left  Narrative: PROCEDURE: VAS DUP LOWER EXTREMITY VENOUS LEFT    CLINICAL INFORMATION: Left calf edema    TECHNIQUE: High-resolution duplex ultrasound with spectral analysis of the left lower extremity was performed. The common femoral, femoral, popliteal and calf vein segments were studied to the ankle.    COMPARISON: None    FINDINGS: There is normal compressibility with no evidence of thrombus. Flow study shows normal color flow, augmentation and phasic response.    There is a heterogeneous structure measuring 13.9 cm in the popliteal fossa.  Impression: 1. No evidence of deep venous thrombosis in the left lower extremity.  2. Left popliteal fossa Baker's cyst.    Final report electronically signed by Dr. Kiel Rojo on 2/7/2024 3:17 PM    Current Outpatient Medications   Medication Instructions    Accu-Chek Multiclix Lancets MISC Use to test blood sugar seven-eight times daily and as needed. Dispense lancets to match patient's meter. Dx: E10.9    aspirin 81 mg, Oral, DAILY    atorvastatin (LIPITOR) 40 mg, Oral, DAILY    blood glucose monitor kit and supplies Accu Chek blood glucose

## 2024-10-24 ENCOUNTER — HOSPITAL ENCOUNTER (OUTPATIENT)
Dept: ULTRASOUND IMAGING | Age: 54
Discharge: HOME | End: 2024-10-24
Payer: COMMERCIAL

## 2024-10-24 DIAGNOSIS — N95.0 ABNORMAL VAGINAL BLEEDING IN POSTMENOPAUSAL PATIENT: ICD-10-CM

## 2024-10-24 PROCEDURE — 76830 TRANSVAGINAL US NON-OB: CPT

## 2024-10-30 ENCOUNTER — TELEPHONE (OUTPATIENT)
Dept: FAMILY MEDICINE CLINIC | Age: 54
End: 2024-10-30

## 2024-10-30 DIAGNOSIS — R93.5 ABNORMAL ULTRASOUND OF OVARY: Primary | ICD-10-CM

## 2024-10-30 NOTE — TELEPHONE ENCOUNTER
----- Message from Dr. Angeli Boo DO sent at 10/30/2024  2:12 PM EDT -----  See telephone encounter dated 10/30/24.

## 2024-11-05 ENCOUNTER — HOSPITAL ENCOUNTER (OUTPATIENT)
Dept: WOMENS IMAGING | Age: 54
Discharge: HOME | End: 2024-11-05
Payer: COMMERCIAL

## 2024-11-05 DIAGNOSIS — Z12.31 SCREENING MAMMOGRAM FOR BREAST CANCER: ICD-10-CM

## 2024-11-05 PROCEDURE — 77063 BREAST TOMOSYNTHESIS BI: CPT

## 2024-11-06 ENCOUNTER — TELEPHONE (OUTPATIENT)
Dept: FAMILY MEDICINE CLINIC | Age: 54
End: 2024-11-06

## 2024-11-06 NOTE — TELEPHONE ENCOUNTER
----- Message from Dr. Angeli Boo DO sent at 11/6/2024 11:08 AM EST -----  Mammogram is negative.

## 2025-01-19 LAB
ALBUMIN: 4 G/DL (ref 3.5–5.2)
ALK PHOSPHATASE: 183 U/L (ref 30–111)
ALT SERPL-CCNC: 18 U/L (ref 5–33)
AST SERPL-CCNC: 18 U/L (ref 9–40)
BILIRUB SERPL-MCNC: 0.6 MG/DL
BILIRUBIN DIRECT: 0.2 MG/DL
CREATINE, URINE: 45.6 MG/DL
ESTIMATED AVERAGE GLUCOSE: 186 MG/DL
HBA1C MFR BLD: 8.1 %
LDL CHOLESTEROL DIRECT: 106 MG/DL
MICROALBUMIN/CREAT 24H UR: 16.1 MG/L
MICROALBUMIN/CREAT UR-RTO: 35 MG/G
TOTAL PROTEIN: 6.8 G/DL (ref 6–8.3)

## 2025-01-21 NOTE — PROGRESS NOTES
[facility]  [address]  [facility] [address]  Dept: [phone]  Dept Fax: [phone]  SHEILA Hill is a 54 y.o.female    Pt presents for annual wellness physical exam.      Patient also presents for follow-up of IDDM, Hyperlipidemia, elevated ALK PHOS, and Allergies.    Patient doing ok at this time-denies any new complaints.      History of Present Illness  The patient is a 54-year-old female who presents today for a follow-up visit.    She has not received her COVID-19 vaccine this year and does not want to receive it. She is up to date on her influenza, tetanus, pneumonia, and shingles vaccines. She is too young to receive the RSV vaccine. Her last Pap smear was conducted in October 2024, and she continues to receive annual screenings. A mammogram was performed on 11/05/2024, with normal results. She had an eye examination in January 2025, during which early-stage cataracts were identified. She is currently under the care of [facility]. She reports no new health issues. She does not monitor her blood pressure at home. She reports no systemic symptoms such as fevers, chills, nausea, vomiting, diarrhea, constipation, headaches, dizziness, blurry vision, chest pain, shortness of breath, arm pain, neck pain, jaw pain, palpitations, unexplained sweating, difficulty climbing stairs or carrying groceries due to chest pain or shortness of breath, difficulty lying down at night, waking up short of breath, swelling in her hands or feet, black tarry bowel movements, bright red blood in her stool, or burning or blood with urination. She reports satisfactory energy levels.    She has been experiencing irregular blood glucose levels at home. This morning, her blood glucose level was 149 upon waking. Last night, after consuming toast with peanut butter and jelly for dinner, her blood glucose level was in the 200 range. Despite attending an exercise

## 2025-01-27 ASSESSMENT — PATIENT HEALTH QUESTIONNAIRE - PHQ9
2. FEELING DOWN, DEPRESSED OR HOPELESS: NOT AT ALL
SUM OF ALL RESPONSES TO PHQ QUESTIONS 1-9: 0
1. LITTLE INTEREST OR PLEASURE IN DOING THINGS: NOT AT ALL
SUM OF ALL RESPONSES TO PHQ9 QUESTIONS 1 & 2: 0
SUM OF ALL RESPONSES TO PHQ QUESTIONS 1-9: 0
SUM OF ALL RESPONSES TO PHQ QUESTIONS 1-9: 0
1. LITTLE INTEREST OR PLEASURE IN DOING THINGS: NOT AT ALL
2. FEELING DOWN, DEPRESSED OR HOPELESS: NOT AT ALL
SUM OF ALL RESPONSES TO PHQ QUESTIONS 1-9: 0
SUM OF ALL RESPONSES TO PHQ9 QUESTIONS 1 & 2: 0

## 2025-01-30 ENCOUNTER — OFFICE VISIT (OUTPATIENT)
Dept: FAMILY MEDICINE CLINIC | Age: 55
End: 2025-01-30

## 2025-01-30 VITALS
OXYGEN SATURATION: 100 % | HEIGHT: 65 IN | DIASTOLIC BLOOD PRESSURE: 80 MMHG | SYSTOLIC BLOOD PRESSURE: 132 MMHG | RESPIRATION RATE: 20 BRPM | BODY MASS INDEX: 40.98 KG/M2 | TEMPERATURE: 98 F | WEIGHT: 246 LBS | HEART RATE: 78 BPM

## 2025-01-30 DIAGNOSIS — E10.9 TYPE 1 DIABETES MELLITUS WITHOUT COMPLICATION (HCC): ICD-10-CM

## 2025-01-30 DIAGNOSIS — R74.8 ALKALINE PHOSPHATASE RAISED: ICD-10-CM

## 2025-01-30 DIAGNOSIS — J30.81 ALLERGIC RHINITIS DUE TO ANIMAL HAIR AND DANDER: ICD-10-CM

## 2025-01-30 DIAGNOSIS — E10.65 TYPE 1 DIABETES MELLITUS WITH HYPERGLYCEMIA (HCC): Primary | ICD-10-CM

## 2025-01-30 DIAGNOSIS — E66.01 OBESITY, CLASS III, BMI 40-49.9 (MORBID OBESITY): ICD-10-CM

## 2025-01-30 DIAGNOSIS — Z12.31 VISIT FOR SCREENING MAMMOGRAM: ICD-10-CM

## 2025-01-30 DIAGNOSIS — E78.5 HYPERLIPIDEMIA, UNSPECIFIED HYPERLIPIDEMIA TYPE: ICD-10-CM

## 2025-01-30 RX ORDER — INSULIN LISPRO 100 [IU]/ML
INJECTION, SOLUTION INTRAVENOUS; SUBCUTANEOUS
Qty: 15 ADJUSTABLE DOSE PRE-FILLED PEN SYRINGE | Refills: 3 | Status: SHIPPED | OUTPATIENT
Start: 2025-01-30

## 2025-01-30 RX ORDER — LISINOPRIL 5 MG/1
5 TABLET ORAL DAILY
Qty: 90 TABLET | Refills: 3 | Status: SHIPPED | OUTPATIENT
Start: 2025-01-30

## 2025-01-30 RX ORDER — ATORVASTATIN CALCIUM 40 MG/1
40 TABLET, FILM COATED ORAL DAILY
Qty: 90 TABLET | Refills: 3 | Status: SHIPPED | OUTPATIENT
Start: 2025-01-30

## 2025-01-30 RX ORDER — INSULIN GLARGINE 100 [IU]/ML
INJECTION, SOLUTION SUBCUTANEOUS
Qty: 10 ADJUSTABLE DOSE PRE-FILLED PEN SYRINGE | Refills: 3 | Status: SHIPPED | OUTPATIENT
Start: 2025-01-30 | End: 2025-01-30

## 2025-01-30 RX ORDER — INSULIN GLARGINE 100 [IU]/ML
INJECTION, SOLUTION SUBCUTANEOUS
Qty: 10 ADJUSTABLE DOSE PRE-FILLED PEN SYRINGE | Refills: 3
Start: 2025-01-30

## 2025-01-30 RX ORDER — ACYCLOVIR 400 MG/1
TABLET ORAL
Qty: 9 EACH | Refills: 3 | Status: SHIPPED | OUTPATIENT
Start: 2025-01-30

## 2025-01-30 SDOH — ECONOMIC STABILITY: FOOD INSECURITY: WITHIN THE PAST 12 MONTHS, THE FOOD YOU BOUGHT JUST DIDN'T LAST AND YOU DIDN'T HAVE MONEY TO GET MORE.: NEVER TRUE

## 2025-01-30 SDOH — ECONOMIC STABILITY: FOOD INSECURITY: WITHIN THE PAST 12 MONTHS, YOU WORRIED THAT YOUR FOOD WOULD RUN OUT BEFORE YOU GOT MONEY TO BUY MORE.: NEVER TRUE

## 2025-01-30 ASSESSMENT — ENCOUNTER SYMPTOMS
ANAL BLEEDING: 0
BLOOD IN STOOL: 0
SHORTNESS OF BREATH: 0
DIARRHEA: 0
NAUSEA: 0
CONSTIPATION: 0
VOMITING: 0
ABDOMINAL PAIN: 0
CHEST TIGHTNESS: 0

## 2025-01-30 NOTE — PATIENT INSTRUCTIONS
Home BP's- call if > 140/90 on a regular basis.   Continue to work on diet, exercise, and weight loss for optimal cardiovascular health   After discussion with pt, will hold on referral back to Dr. Mcguire for elevated ALK PHOS as values stable for many years.  Be diligent about keeping a diary on those \"weird\" glucose days.   Check HGA1C, FLP, CMP, TSH with reflex free T4 in 3 months  Continue current medicines otherwise.  Refills needed.  Follow up in 3 months with Dr. Russo on Thursday afternoon.  Follow up in 6 months with Dr. Russo on Thursday afternoon.  Follow up in 12 months with me.          Preventive Health Topics (updated 1/30/2025):  Encouraged COVID VACCINE booster- pt declines   PAP/ PELVIC done per [facility]- last appt 10/2024- to follow up annually.  MAMMO due after 11/5/2025  COLONOSCOPY done 12/17/2019 per Dr. Mcguire- to do again in 12/2029.   DILATED EYE EXAM done 1/2025 per  Dr. Figueredo- all ok- to do annually. (Cataracts starting)

## 2025-01-30 NOTE — PROGRESS NOTES
Current Diabetes Pharmacotherapy:  Insulin glargine 16 units BID  Insulin lispro 7 units at breakfast, 10 units at lunch and dinner    Glucose Trends:   Current monitoring regimen: Dexcom CGM - checks 4+ times daily  Home blood sugar trends:    -V. High: 15%, High: 32%, Target: 52%, Low: 1%, V. Low: <1%    Any episodes of hypoglycemia? yes - once occasionally     Hemoglobin A1C   Date Value Ref Range Status   01/18/2025 8.1 (H) <5.7 % Final     Comment:              Prediabetes:  5.7% to 6.4%                             Diabetes:  >6.4%           Glycemic control for adults with diabetes:  <7.0%           Use with caution in patients with abnormal hemoglobin           variants as the half-life of red blood cells and in vivo           glycation rates are affected.         Albumin/Creatinine Ratio   Date Value Ref Range Status   01/18/2025 35 (H) <30 mG/G Final     Comment:             INTERPRETATIVE GUIDE          NORMAL........................ 0-29  MODERATELY INCREASED..........   SEVERELY INCREASED............ >300         Assessment:   The patient's TIR is 52% (goal >70%) and A1c is above goal. Glucose is consistently elevated throughout the day.     Recommendation(s):   Please consider increasing insulin glargine from 16 units BID to 19 units BID (~10% TDD increase) to optimize glucose control.       Thank you,     Minda Coats, PharmD  PGY2 Resident   1/30/2025 11:18 AM    For Pharmacy Admin Tracking Only    Program: [facility]  CPA in place:  Yes  Recommendation Provided To: Provider: 1 via Verbally to provider  Intervention Detail: Dose Adjustment: 1, reason: Therapy Optimization  Intervention Accepted By: Provider: 1  Gap Closed?: No   Time Spent (min): 20

## 2025-01-30 NOTE — PROGRESS NOTES
Spoke to the patient about increasing insulin glargine from 16 units BID to 19 units BID to optimize glucose control.       For Pharmacy Admin Tracking Only    Program: Medical Group  CPA in place:  Yes  Recommendation Provided To: Patient/Caregiver: 1 via Telephone  Intervention Detail: Dose Adjustment: 1, reason: Therapy Optimization  Intervention Accepted By: Patient/Caregiver: 1  Gap Closed?: Yes   Time Spent (min): 10      Minda Coats PharmD  PGY2 Resident   1/30/2025 2:50 PM

## 2025-03-05 DIAGNOSIS — E10.9 TYPE 1 DIABETES MELLITUS WITHOUT COMPLICATION (HCC): ICD-10-CM

## 2025-03-05 RX ORDER — INSULIN GLARGINE 100 [IU]/ML
INJECTION, SOLUTION SUBCUTANEOUS
Qty: 10 ADJUSTABLE DOSE PRE-FILLED PEN SYRINGE | Refills: 3 | Status: SHIPPED | OUTPATIENT
Start: 2025-03-05

## 2025-03-05 NOTE — TELEPHONE ENCOUNTER
Patient's last appointment was: 1/30/2025  with our   Patient's next appointment is: 5/1/2025  with our Dr. Russo  Last refilled on: 01/30/2025  Which pharmacy does the script need sent to: [facility]      Lab Results   Component Value Date    LABA1C 8.1 (H) 01/18/2025     Lab Results   Component Value Date    CHOL 157 01/06/2024    TRIG 75 01/06/2024    HDL 57 01/06/2024    LDLDIRECT 106 01/18/2025     Lab Results   Component Value Date     04/13/2024    K 5.0 04/13/2024    CL 98 04/13/2024    CO2 32 (H) 04/13/2024    BUN 14 04/13/2024    CREATININE 0.72 04/13/2024    GLUCOSE 347 (H) 04/13/2024    CALCIUM 9.6 04/13/2024    BILITOT 0.6 01/18/2025    ALKPHOS 183 (H) 01/18/2025    AST 18 01/18/2025    ALT 18 01/18/2025    LABGLOM >60 07/07/2023     Lab Results   Component Value Date    TSH 2.67 01/06/2024    T4FREE 0.83 12/11/2021     Lab Results   Component Value Date    WBC 6.2 10/13/2024    HGB 13.5 10/13/2024    HCT 39.8 10/13/2024    MCV 86 10/13/2024     10/13/2024

## 2025-03-08 DIAGNOSIS — E10.9 TYPE 1 DIABETES MELLITUS WITHOUT COMPLICATION (HCC): ICD-10-CM

## 2025-03-10 RX ORDER — INSULIN GLARGINE 100 [IU]/ML
INJECTION, SOLUTION SUBCUTANEOUS
Qty: 10 ADJUSTABLE DOSE PRE-FILLED PEN SYRINGE | Refills: 3 | Status: SHIPPED | OUTPATIENT
Start: 2025-03-10

## 2025-03-10 NOTE — TELEPHONE ENCOUNTER
Patient's last appointment was: 1/30/2025  with our   Patient's next appointment is: 5/1/2025  with our Dr. Russo  Last refilled on: 3/5/2025  Which pharmacy does the script need sent to: [facility]      Lab Results   Component Value Date    LABA1C 8.1 (H) 01/18/2025     Lab Results   Component Value Date    CHOL 157 01/06/2024    TRIG 75 01/06/2024    HDL 57 01/06/2024    LDLDIRECT 106 01/18/2025     Lab Results   Component Value Date     04/13/2024    K 5.0 04/13/2024    CL 98 04/13/2024    CO2 32 (H) 04/13/2024    BUN 14 04/13/2024    CREATININE 0.72 04/13/2024    GLUCOSE 347 (H) 04/13/2024    CALCIUM 9.6 04/13/2024    BILITOT 0.6 01/18/2025    ALKPHOS 183 (H) 01/18/2025    AST 18 01/18/2025    ALT 18 01/18/2025    LABGLOM >60 07/07/2023     Lab Results   Component Value Date    TSH 2.67 01/06/2024    T4FREE 0.83 12/11/2021     Lab Results   Component Value Date    WBC 6.2 10/13/2024    HGB 13.5 10/13/2024    HCT 39.8 10/13/2024    MCV 86 10/13/2024     10/13/2024

## 2025-04-27 ENCOUNTER — RESULTS FOLLOW-UP (OUTPATIENT)
Dept: FAMILY MEDICINE CLINIC | Age: 55
End: 2025-04-27

## 2025-04-27 LAB
A/G RATIO: 1.4 RATIO (ref 1–2.5)
ALBUMIN: 3.9 G/DL (ref 3.5–5.2)
ALK PHOSPHATASE: 185 U/L (ref 30–111)
ALT SERPL-CCNC: 17 U/L (ref 5–33)
AST SERPL-CCNC: 15 U/L (ref 9–40)
BILIRUB SERPL-MCNC: 0.5 MG/DL
BUN BLDV-MCNC: 16 MG/DL (ref 6–20)
CALCIUM SERPL-MCNC: 9.6 MG/DL (ref 8.6–10.5)
CHLORIDE BLD-SCNC: 97 MMOL/L (ref 96–107)
CHOLESTEROL, TOTAL: 181 MG/DL (ref 100–199)
CHOLESTEROL/HDL RATIO: 2.9 (ref 2–4.5)
CO2: 27 MMOL/L (ref 18–32)
CREAT SERPL-MCNC: 0.73 MG/DL (ref 0.51–1.15)
EGFR IF NONAFRICAN AMERICAN: 97 ML/MIN/1.73M2
ESTIMATED AVERAGE GLUCOSE: 163 MG/DL
GLOBULIN: 2.7 G/DL (ref 1.8–3.8)
GLUCOSE: 321 MG/DL (ref 70–100)
HBA1C MFR BLD: 7.3 %
HDLC SERPL-MCNC: 62 MG/DL
LDL CHOLESTEROL: 100 MG/DL
POTASSIUM SERPL-SCNC: 4.3 MMOL/L (ref 3.5–5.4)
SODIUM BLD-SCNC: 137 MMOL/L (ref 135–148)
TOTAL PROTEIN: 6.6 G/DL (ref 6–8.3)
TRIGL SERPL-MCNC: 97 MG/DL (ref 20–149)
TSH SERPL DL<=0.05 MIU/L-ACNC: 2.35 UIU/ML (ref 0.27–4.2)
VLDLC SERPL CALC-MCNC: 19 MG/DL

## 2025-05-01 ENCOUNTER — OFFICE VISIT (OUTPATIENT)
Dept: FAMILY MEDICINE CLINIC | Age: 55
End: 2025-05-01
Payer: COMMERCIAL

## 2025-05-01 VITALS
WEIGHT: 247.2 LBS | HEIGHT: 65 IN | OXYGEN SATURATION: 98 % | BODY MASS INDEX: 41.19 KG/M2 | HEART RATE: 86 BPM | RESPIRATION RATE: 18 BRPM | DIASTOLIC BLOOD PRESSURE: 86 MMHG | TEMPERATURE: 98 F | SYSTOLIC BLOOD PRESSURE: 128 MMHG

## 2025-05-01 DIAGNOSIS — E66.813 OBESITY, CLASS III, BMI 40-49.9 (MORBID OBESITY) (HCC): ICD-10-CM

## 2025-05-01 DIAGNOSIS — E10.65 TYPE 1 DIABETES MELLITUS WITH HYPERGLYCEMIA (HCC): Primary | ICD-10-CM

## 2025-05-01 DIAGNOSIS — E78.5 HYPERLIPIDEMIA, UNSPECIFIED HYPERLIPIDEMIA TYPE: ICD-10-CM

## 2025-05-01 DIAGNOSIS — R74.8 ALKALINE PHOSPHATASE RAISED: ICD-10-CM

## 2025-05-01 PROCEDURE — 99214 OFFICE O/P EST MOD 30 MIN: CPT

## 2025-05-01 PROCEDURE — 3051F HG A1C>EQUAL 7.0%<8.0%: CPT

## 2025-05-01 NOTE — PROGRESS NOTES
S: 54 y.o. female with   Chief Complaint   Patient presents with    Follow-up     Patient in office today for 3 month follow up.        HPI: please see resident note for HPI and ROS.    BP Readings from Last 3 Encounters:   05/01/25 128/86   01/30/25 132/80   10/21/24 126/70     Wt Readings from Last 3 Encounters:   05/01/25 112.1 kg (247 lb 3.2 oz)   01/30/25 111.6 kg (246 lb)   10/21/24 109.1 kg (240 lb 9.6 oz)       O: VS:  height is 1.651 m (5' 5\") and weight is 112.1 kg (247 lb 3.2 oz). Her oral temperature is 98 °F (36.7 °C). Her blood pressure is 128/86 and her pulse is 86. Her respiration is 18 and oxygen saturation is 98%.   AAO/NAD, appropriate affect for mood       Diagnosis Orders   1. Type 1 diabetes mellitus with hyperglycemia (E10.65)        2. Hyperlipidemia, unspecified hyperlipidemia type        3. Obesity, Class III, BMI 40-49.9 (morbid obesity) (HCC)        4. Alkaline phosphatase raised            Plan:  Please refer to resident note for full plan.    54-year-old female here for follow up. Type 1 diabetes is stable. Hemoglobin A1c 7.3%. BS ranging  primarily. No lows. On statin. Eye exam UTD. Follows with OBGYN for pap exams. Labs ordered previously. Follow up in 3 months for recheck.     Health Maintenance Due   Topic Date Due    Hepatitis B vaccine (1 of 3 - 19+ 3-dose series) Never done    Diabetic retinal exam  08/25/2022    Cervical cancer screen  02/15/2025       Attending Physician Statement  I have discussed the case, including pertinent history and exam findings with the resident. I also have seen the patient and performed key portions of the examination.  I agree with the documented assessment and plan as documented by the resident.        Polly Krause DO 5/5/2025 1:37 PM

## 2025-05-01 NOTE — PROGRESS NOTES
[facility]  [address]  [place] [id number]  Dept: [phone]  Loc: [phone]        Zee Hill is a 54 y.o. female who presents today for:  Chief Complaint   Patient presents with    Follow-up     Patient in office today for 3 month follow up.        HPI:   Zee Hill is 54 y.o. presents today for follow-up.     Last seen 1/30 for wellness    Labs reviewed in visit and significant for :   alk phos 185 on 4/26, stable and chronic - states that she went to GI ~3-4 years ago, attributed it to statin .  Would like to just continue to monitor at this time  A1c 7.3 on 4/26 compared to 8.1 on Lantus 19u BID + lispro 7 in the am and 10 with lunch and dinner  On lipitor 40, and lisinopril     She states that since last being seen her sugars have been ranging from  one day and other days her sugars ~200s that she attributes to stress.  She does admit that since the last visit she has stopped snacking and increase the amount of vegetables in her diet.  Does admit to walking about 2 to 3 miles almost every day along with cardio drumming once weekly    Mammogram due after 11/5/25.   Sees family planning yearly, last seen 10/2024.   Eye exam scheduled for Dr. Figueredo on 1/9/25- cataracts forming but no change from the year prior     Objective:     Vitals:    05/01/25 1330   BP: 128/86   Pulse: 86   Resp: 18   Temp: 98 °F (36.7 °C)   SpO2: 98%         Wt Readings from Last 3 Encounters:   05/01/25 112.1 kg (247 lb 3.2 oz)   01/30/25 111.6 kg (246 lb)   10/21/24 109.1 kg (240 lb 9.6 oz)       BP Readings from Last 3 Encounters:   05/01/25 128/86   01/30/25 132/80   10/21/24 126/70       Lab Results   Component Value Date    WBC 6.2 10/13/2024    HGB 13.5 10/13/2024    HCT 39.8 10/13/2024    MCV 86 10/13/2024     10/13/2024     Lab Results   Component Value Date     04/26/2025    K 4.3 04/26/2025    CL 97

## 2025-07-27 LAB
ALBUMIN: 3.9 G/DL (ref 3.5–5.2)
ALK PHOSPHATASE: 205 U/L (ref 30–111)
ALT SERPL-CCNC: 15 U/L (ref 5–33)
ANION GAP SERPL CALCULATED.3IONS-SCNC: 10 MMOL/L (ref 7–16)
AST SERPL-CCNC: 14 U/L (ref 9–40)
BILIRUB SERPL-MCNC: 0.5 MG/DL
BUN BLDV-MCNC: 15 MG/DL (ref 6–20)
CALCIUM SERPL-MCNC: 9.3 MG/DL (ref 8.6–10.5)
CHLORIDE BLD-SCNC: 98 MMOL/L (ref 96–107)
CHOLESTEROL, TOTAL: 176 MG/DL (ref 100–199)
CHOLESTEROL/HDL RATIO: 3 (ref 2–4.5)
CO2: 27 MMOL/L (ref 18–32)
CREAT SERPL-MCNC: 0.77 MG/DL (ref 0.51–1.15)
EGFR IF NONAFRICAN AMERICAN: 91 ML/MIN/1.73M2
ESTIMATED AVERAGE GLUCOSE: 174 MG/DL
GLUCOSE: 299 MG/DL (ref 70–100)
HBA1C MFR BLD: 7.7 %
HDLC SERPL-MCNC: 58 MG/DL
LDL CHOLESTEROL: 93 MG/DL
LDL/HDL RATIO: 1.6
POTASSIUM SERPL-SCNC: 4.6 MMOL/L (ref 3.5–5.4)
SODIUM BLD-SCNC: 135 MMOL/L (ref 135–148)
TOTAL PROTEIN: 6.7 G/DL (ref 6–8.3)
TRIGL SERPL-MCNC: 123 MG/DL (ref 20–149)
TSH SERPL DL<=0.05 MIU/L-ACNC: 3.34 UIU/ML (ref 0.27–4.2)
VLDLC SERPL CALC-MCNC: 25 MG/DL

## 2025-08-01 ENCOUNTER — OFFICE VISIT (OUTPATIENT)
Dept: FAMILY MEDICINE CLINIC | Age: 55
End: 2025-08-01
Payer: COMMERCIAL

## 2025-08-01 VITALS
HEIGHT: 65 IN | BODY MASS INDEX: 41.72 KG/M2 | SYSTOLIC BLOOD PRESSURE: 128 MMHG | DIASTOLIC BLOOD PRESSURE: 84 MMHG | RESPIRATION RATE: 18 BRPM | HEART RATE: 86 BPM | TEMPERATURE: 98.4 F | OXYGEN SATURATION: 98 % | WEIGHT: 250.4 LBS

## 2025-08-01 DIAGNOSIS — E10.65 TYPE 1 DIABETES MELLITUS WITH HYPERGLYCEMIA (HCC): Primary | ICD-10-CM

## 2025-08-01 DIAGNOSIS — R74.8 ALKALINE PHOSPHATASE RAISED: ICD-10-CM

## 2025-08-01 DIAGNOSIS — E78.5 HYPERLIPIDEMIA, UNSPECIFIED HYPERLIPIDEMIA TYPE: ICD-10-CM

## 2025-08-01 DIAGNOSIS — E66.813 OBESITY, CLASS III, BMI 40-49.9 (MORBID OBESITY) (HCC): ICD-10-CM

## 2025-08-01 PROCEDURE — 3051F HG A1C>EQUAL 7.0%<8.0%: CPT

## 2025-08-01 PROCEDURE — 99214 OFFICE O/P EST MOD 30 MIN: CPT

## 2025-08-01 NOTE — PROGRESS NOTES
Attending Physician Note    I have seen and evaluated the patient. I discussed the findings, assessment and plan with the resident and agree with the resident's findings and plan as documented in the resident's note.  GC modifier added.    Brief summary:  Type 1 DM, not controlled - encouraged pt to consider insulin pump. Pt reluctant to adjust insulin dosing, agreed to increase lantus to 20u BID. Rec food diary and compare intake with glucose readings from cgm to gain insight and modify diet to help with glycemic control. Consider appt with pharmacist Dr. Prince for help with this. Would benefit from GLP-1 agonist to help with weight loss, but would need more stable sugars and ideally the flexibility of an insulin pump.  
vaccine (1 of 3 - 19+ 3-dose series) Never done    Diabetic retinal exam  08/25/2022    Cervical cancer screen  02/15/2025    Flu vaccine (1) 08/01/2025       Assessment / Plan:   1. Type 1 diabetes mellitus with hyperglycemia (E10.65)  A1c 7.7 on 7/26/2025 compared to 7.3 on 4/26/2025  Currently on Lantus 19 units twice a day, lispro 7 units in the morning and 10 units with lunch and dinner  Continue statin  Discussed option of insulin pump however patient currently not interested  Patient amenable to increase Lantus to 20 units twice a day  Recommend patient keep a food diary  Recommend patient eat high-protein snack prior to bedtime to help regulate nighttime sugars  Can consider GLP 1 in the future once sugars are more stabilized    - Hemoglobin A1C; Future  - CBC; Future    2. Hyperlipidemia, unspecified hyperlipidemia type  Continue statin    3. Obesity, Class III, BMI 40-49.9 (morbid obesity) (HCC)  Continue to work on diet and exercise as stated in HPI    4. Alkaline phosphatase raised  chronic  Alk phos 205 on 7/26 compared to 185 on 4/26  Patient would like to continue to monitor at this time        Patient given educational materials - see patient instructions.  Discussed use, benefit, and side effects of prescribed medications.  All patient questions answered.  They voiced understanding. Patient agreed with treatment plan. Follow up as directed.        Return in about 3 months (around 11/1/2025) for with me for DM .      Future Appointments   Date Time Provider Department Center   2/5/2026  8:30 AM Jerzy Duran MD [facility] DEP         Electronically signed by Millie Russo MD on 8/1/2025 at 12:06 PM

## 2025-08-04 ENCOUNTER — TELEPHONE (OUTPATIENT)
Dept: FAMILY MEDICINE CLINIC | Age: 55
End: 2025-08-04